# Patient Record
Sex: FEMALE | Race: WHITE | NOT HISPANIC OR LATINO | URBAN - METROPOLITAN AREA
[De-identification: names, ages, dates, MRNs, and addresses within clinical notes are randomized per-mention and may not be internally consistent; named-entity substitution may affect disease eponyms.]

---

## 2017-07-13 ENCOUNTER — INPATIENT (INPATIENT)
Facility: HOSPITAL | Age: 72
LOS: 3 days | Discharge: HOME | DRG: 690 | End: 2017-07-17
Attending: INTERNAL MEDICINE | Admitting: INTERNAL MEDICINE
Payer: MEDICARE

## 2017-07-13 VITALS
OXYGEN SATURATION: 97 % | HEART RATE: 67 BPM | RESPIRATION RATE: 16 BRPM | DIASTOLIC BLOOD PRESSURE: 71 MMHG | SYSTOLIC BLOOD PRESSURE: 147 MMHG | WEIGHT: 160.06 LBS | HEIGHT: 66 IN | TEMPERATURE: 100 F

## 2017-07-13 DIAGNOSIS — F20.5 RESIDUAL SCHIZOPHRENIA: ICD-10-CM

## 2017-07-13 DIAGNOSIS — R69 ILLNESS, UNSPECIFIED: ICD-10-CM

## 2017-07-13 DIAGNOSIS — F41.9 ANXIETY DISORDER, UNSPECIFIED: ICD-10-CM

## 2017-07-13 LAB
ANION GAP SERPL CALC-SCNC: 9 MMOL/L — SIGNIFICANT CHANGE UP (ref 5–17)
APAP SERPL-MCNC: <2 UG/ML — LOW (ref 10–30)
APPEARANCE UR: CLEAR — SIGNIFICANT CHANGE UP
BASOPHILS # BLD AUTO: 0.1 K/UL — SIGNIFICANT CHANGE UP (ref 0–0.2)
BASOPHILS NFR BLD AUTO: 1.1 % — SIGNIFICANT CHANGE UP (ref 0–2)
BILIRUB UR-MCNC: NEGATIVE — SIGNIFICANT CHANGE UP
BUN SERPL-MCNC: 25 MG/DL — HIGH (ref 7–18)
CALCIUM SERPL-MCNC: 9 MG/DL — SIGNIFICANT CHANGE UP (ref 8.4–10.5)
CHLORIDE SERPL-SCNC: 114 MMOL/L — HIGH (ref 96–108)
CO2 SERPL-SCNC: 23 MMOL/L — SIGNIFICANT CHANGE UP (ref 22–31)
COLOR SPEC: YELLOW — SIGNIFICANT CHANGE UP
CREAT SERPL-MCNC: 1.02 MG/DL — SIGNIFICANT CHANGE UP (ref 0.5–1.3)
DIFF PNL FLD: ABNORMAL
EOSINOPHIL # BLD AUTO: 0.1 K/UL — SIGNIFICANT CHANGE UP (ref 0–0.5)
EOSINOPHIL NFR BLD AUTO: 1.1 % — SIGNIFICANT CHANGE UP (ref 0–6)
GLUCOSE SERPL-MCNC: 96 MG/DL — SIGNIFICANT CHANGE UP (ref 70–99)
GLUCOSE UR QL: NEGATIVE — SIGNIFICANT CHANGE UP
HCT VFR BLD CALC: 39.4 % — SIGNIFICANT CHANGE UP (ref 34.5–45)
HGB BLD-MCNC: 13.5 G/DL — SIGNIFICANT CHANGE UP (ref 11.5–15.5)
KETONES UR-MCNC: ABNORMAL
LEUKOCYTE ESTERASE UR-ACNC: ABNORMAL
LYMPHOCYTES # BLD AUTO: 3.4 K/UL — HIGH (ref 1–3.3)
LYMPHOCYTES # BLD AUTO: 30.7 % — SIGNIFICANT CHANGE UP (ref 13–44)
MCHC RBC-ENTMCNC: 32.8 PG — SIGNIFICANT CHANGE UP (ref 27–34)
MCHC RBC-ENTMCNC: 34.4 GM/DL — SIGNIFICANT CHANGE UP (ref 32–36)
MCV RBC AUTO: 95.4 FL — SIGNIFICANT CHANGE UP (ref 80–100)
MONOCYTES # BLD AUTO: 1 K/UL — HIGH (ref 0–0.9)
MONOCYTES NFR BLD AUTO: 9.2 % — SIGNIFICANT CHANGE UP (ref 2–14)
NEUTROPHILS # BLD AUTO: 6.4 K/UL — SIGNIFICANT CHANGE UP (ref 1.8–7.4)
NEUTROPHILS NFR BLD AUTO: 57.9 % — SIGNIFICANT CHANGE UP (ref 43–77)
NITRITE UR-MCNC: POSITIVE
PCP SPEC-MCNC: SIGNIFICANT CHANGE UP
PH UR: 5 — SIGNIFICANT CHANGE UP (ref 5–8)
PLATELET # BLD AUTO: 171 K/UL — SIGNIFICANT CHANGE UP (ref 150–400)
POTASSIUM SERPL-MCNC: 3.5 MMOL/L — SIGNIFICANT CHANGE UP (ref 3.5–5.3)
POTASSIUM SERPL-SCNC: 3.5 MMOL/L — SIGNIFICANT CHANGE UP (ref 3.5–5.3)
PROT UR-MCNC: 30 MG/DL
RBC # BLD: 4.12 M/UL — SIGNIFICANT CHANGE UP (ref 3.8–5.2)
RBC # FLD: 12.3 % — SIGNIFICANT CHANGE UP (ref 10.3–14.5)
SALICYLATES SERPL-MCNC: <1.7 MG/DL — LOW (ref 2.8–20)
SODIUM SERPL-SCNC: 146 MMOL/L — HIGH (ref 135–145)
SP GR SPEC: 1.03 — HIGH (ref 1.01–1.02)
UROBILINOGEN FLD QL: 1
WBC # BLD: 11.1 K/UL — HIGH (ref 3.8–10.5)
WBC # FLD AUTO: 11.1 K/UL — HIGH (ref 3.8–10.5)

## 2017-07-13 PROCEDURE — 70450 CT HEAD/BRAIN W/O DYE: CPT | Mod: 26

## 2017-07-13 RX ORDER — CEFTRIAXONE 500 MG/1
1 INJECTION, POWDER, FOR SOLUTION INTRAMUSCULAR; INTRAVENOUS ONCE
Qty: 0 | Refills: 0 | Status: COMPLETED | OUTPATIENT
Start: 2017-07-13 | End: 2017-07-13

## 2017-07-13 RX ORDER — FLUPHENAZINE HYDROCHLORIDE 1 MG/1
1 TABLET, FILM COATED ORAL ONCE
Qty: 0 | Refills: 0 | Status: COMPLETED | OUTPATIENT
Start: 2017-07-13 | End: 2017-07-13

## 2017-07-13 RX ADMIN — CEFTRIAXONE 100 GRAM(S): 500 INJECTION, POWDER, FOR SOLUTION INTRAMUSCULAR; INTRAVENOUS at 23:47

## 2017-07-13 RX ADMIN — FLUPHENAZINE HYDROCHLORIDE 1 MILLIGRAM(S): 1 TABLET, FILM COATED ORAL at 23:41

## 2017-07-13 NOTE — ED PROVIDER NOTE - OBJECTIVE STATEMENT
73 y/o F pt w/ PMHx of schizophrenia was BIB son to ED c/o insomnia and weakness x3 days. Pt notes that she has not been sleeping or eating much the last 3 days. Pt denies pain, fever, chills, nausea, vomiting, hallucinations, SI/HI, or any other complaints. NKDA.

## 2017-07-13 NOTE — ED BEHAVIORAL HEALTH ASSESSMENT NOTE - SUMMARY
Patient is a 72 year old  female; domiciled with adult son; noncaregiver; PPH of schizophrenia; 3 prior hospitalizations last in 1980s; no known suicide attempts; no known history of violence or arrests; no active substance abuse or known history of complicated withdrawal; PMH of HTN and hypercholesterolemia brought in by sons due to weakness and not sleeping. Patient appears anxious and at times confused, however denies si/hi/avh, no delusions, no safety concerns from son or outpatient therapist. Patient has close follow up with out patient therapist and psychiatrist, given dose of her prolixin in ER as ran out of medication. Patient is currently not a danger to herself or others and does not require inpatient psychiatrist hospitalization.

## 2017-07-13 NOTE — ED PROVIDER NOTE - CARE PLAN
Principal Discharge DX:	Acute cystitis with hematuria  Secondary Diagnosis:	Schizophrenia, unspecified type  Secondary Diagnosis:	Delirium

## 2017-07-13 NOTE — ED BEHAVIORAL HEALTH ASSESSMENT NOTE - RISK ASSESSMENT
Although patient has risk factors of  schizophrenia, anxiety symptoms, prior hospitalizations, positive family hx, she has protective factors of  no hx of prior suicide attempts, no self- injurious behavior, no history of violence or aggression, motivation to participate in outpatient care and seek help, compliance with medications- f/u, no active substance use, no access to firearms, no hx of abuse. Patient is currently of low acute risk of danger to herself or others and does not require inpatient psychiatric hospitalization.

## 2017-07-13 NOTE — ED ADULT NURSE NOTE - CHIEF COMPLAINT QUOTE
C/o I haven't been sleeping for 3 days. "She needs a prolixin shot" per son, she hasn't taken it in a few months.

## 2017-07-13 NOTE — ED BEHAVIORAL HEALTH ASSESSMENT NOTE - HPI (INCLUDE ILLNESS QUALITY, SEVERITY, DURATION, TIMING, CONTEXT, MODIFYING FACTORS, ASSOCIATED SIGNS AND SYMPTOMS)
Patient is a 72 year old  female; domiciled with adult son; noncaregiver; PPH of schizophrenia; 3 prior hospitalizations last in 1980s; no known suicide attempts; no known history of violence or arrests; no active substance abuse or known history of complicated withdrawal; PMH of HTN and hypercholesterolemia brought in by sons due to weakness and not sleeping.    The patient denies depression or other significant mood symptoms.  Specifically, the patient denies manic symptoms, past and present.  The patient denies auditory or visual hallucinations, and no delusions could be elicited on direct questioning.  The patient denies suicidal ideation, homicidal ideation, intent, or plan. She reports anxiety regarding her sons' health, becomes tearful when talking about the possibility of outliving her sons. She states that she was in her usual state of health, going shopping for food, cleaning dishes and laundry and the last 3 days has been unable to sleep, has been weak and not able to do the things she usually does. She admits to some feelings of confusion, not being sure of the date. She is unable to spell WORLD backwards or count backwards from 100 by 3s or 7s. Patient is a 72 year old  female; domiciled with adult son; noncaregiver; PPH of schizophrenia; 3 prior hospitalizations last in 1980s; no known suicide attempts; no known history of violence or arrests; no active substance abuse or known history of complicated withdrawal; PMH of HTN and hypercholesterolemia brought in by sons due to weakness and not sleeping.    The patient denies depression or other significant mood symptoms.  Specifically, the patient denies manic symptoms, past and present.  The patient denies auditory or visual hallucinations, and no delusions could be elicited on direct questioning.  The patient denies suicidal ideation, homicidal ideation, intent, or plan. She reports anxiety regarding her sons' health, becomes tearful when talking about the possibility of outliving her sons. She states that she was in her usual state of health, going shopping for food, cleaning dishes and laundry and the last 3 days has been unable to sleep, has been weak and not able to do the things she usually does. She admits to some feelings of confusion, not being sure of the date. She is unable to spell WORLD backwards or count backwards from 100 by 3s or 7s. Patient states that she is compliant with her medications, prolixin, mirtazepine, and benydryl, Patient is a 72 year old  female; domiciled with adult son; noncaregiver; PPH of schizophrenia; 3 prior hospitalizations last in 1980s; no known suicide attempts; no known history of violence or arrests; no active substance abuse or known history of complicated withdrawal; PMH of HTN and hypercholesterolemia brought in by sons due to weakness and not sleeping.    The patient denies depression or other significant mood symptoms.  Specifically, the patient denies manic symptoms, past and present.  The patient denies auditory or visual hallucinations, and no delusions could be elicited on direct questioning.  The patient denies suicidal ideation, homicidal ideation, intent, or plan. She reports anxiety regarding her sons' health, becomes tearful when talking about the possibility of outliving her sons. She states that she was in her usual state of health, going shopping for food, cleaning dishes and laundry and the last 3 days has been unable to sleep, has been weak and not able to do the things she usually does. She admits to some feelings of confusion, not being sure of the date. She is unable to spell WORLD backwards or count backwards from 100 by 3s or 7s. Patient states that she is compliant with her medications, prolixin, mirtazepine, and benadryl, as well as her medications for hypertension and high cholesterol.    See SW note for further collateral. Per pts therapist, she is compliant with treatment, in terms of medications and Patient is a 72 year old  female; domiciled with adult son; noncaregiver; PPH of schizophrenia; 3 prior hospitalizations last in 1980s; no known suicide attempts; no known history of violence or arrests; no active substance abuse or known history of complicated withdrawal; PMH of HTN and hypercholesterolemia brought in by sons due to weakness and not sleeping.    The patient denies depression or other significant mood symptoms.  Specifically, the patient denies manic symptoms, past and present.  The patient denies auditory or visual hallucinations, and no delusions could be elicited on direct questioning.  The patient denies suicidal ideation, homicidal ideation, intent, or plan. She reports anxiety regarding her sons' health, becomes tearful when talking about the possibility of outliving her sons. She states that she was in her usual state of health, going shopping for food, cleaning dishes and laundry and the last 3 days has been unable to sleep, has been weak and not able to do the things she usually does. She admits to some feelings of confusion, not being sure of the date. She is unable to spell WORLD backwards or count backwards from 100 by 3s or 7s. Patient states that she is compliant with her medications, prolixin, mirtazepine, and benadryl, as well as her medications for hypertension and high cholesterol.    See SW note for further collateral. Per pts therapist, she is compliant with treatment, in terms of medications and follow up for therapy. Therapist states that he psychiatrist switched her from IM to PO prolixin a few months ago and she has been doing well with the switch. He states that she has been at her baseline, anxious about son's and their health (states lyla So has MH issues of his own and has medical issues which he has not been taking care of). Therapist denies history of suicidality or aggression, no psychosis in many years. Per son Judah, patient has not been sleeping and this has been distressing for him, however he denies concerns regarding patient's safety or safety of others. Son states that he feels that patient did better when on prolixin injection and is hoping that she will return to that. SW stressed importance of patient attending appointment with her therapist and psychiatrist this Saturday 7/15, son aware.    Plan communicated with ER attending Dr Tuttle, states that pt states she did not take her medication because she ran out, to get 1 dose in the ER. Will communicate disposition and that patient needs RX ASAP to therapist and psychiatrist.

## 2017-07-13 NOTE — ED BEHAVIORAL HEALTH ASSESSMENT NOTE - OTHER PAST PSYCHIATRIC HISTORY (INCLUDE DETAILS REGARDING ONSET, COURSE OF ILLNESS, INPATIENT/OUTPATIENT TREATMENT)
history of schizophrenia, multiple admissions in 1980s, none since then, currently on prolixin, remeron and benadryl, sees psychiatrist and therapist at [facility]

## 2017-07-13 NOTE — ED ADULT NURSE NOTE - OBJECTIVE STATEMENT
72 years old female known history of schizophrenia brought to ED by son history reported of insomnia for 3 days. Son also stated that patient has not been having her I.M injectables for the last 2 months and thinks that she needs further psychiatric evaluation. Patient placed on 1:1, yellow gown in place, roam alert and red socks applied.

## 2017-07-14 DIAGNOSIS — E78.00 PURE HYPERCHOLESTEROLEMIA, UNSPECIFIED: ICD-10-CM

## 2017-07-14 DIAGNOSIS — F20.9 SCHIZOPHRENIA, UNSPECIFIED: ICD-10-CM

## 2017-07-14 DIAGNOSIS — N30.01 ACUTE CYSTITIS WITH HEMATURIA: ICD-10-CM

## 2017-07-14 DIAGNOSIS — Z90.710 ACQUIRED ABSENCE OF BOTH CERVIX AND UTERUS: Chronic | ICD-10-CM

## 2017-07-14 DIAGNOSIS — I10 ESSENTIAL (PRIMARY) HYPERTENSION: ICD-10-CM

## 2017-07-14 DIAGNOSIS — Z29.9 ENCOUNTER FOR PROPHYLACTIC MEASURES, UNSPECIFIED: ICD-10-CM

## 2017-07-14 DIAGNOSIS — E87.8 OTHER DISORDERS OF ELECTROLYTE AND FLUID BALANCE, NOT ELSEWHERE CLASSIFIED: ICD-10-CM

## 2017-07-14 LAB
24R-OH-CALCIDIOL SERPL-MCNC: 30.6 NG/ML — SIGNIFICANT CHANGE UP (ref 30–100)
ALBUMIN SERPL ELPH-MCNC: 3.5 G/DL — SIGNIFICANT CHANGE UP (ref 3.5–5)
ALP SERPL-CCNC: 121 U/L — HIGH (ref 40–120)
ALT FLD-CCNC: 24 U/L DA — SIGNIFICANT CHANGE UP (ref 10–60)
ANION GAP SERPL CALC-SCNC: 10 MMOL/L — SIGNIFICANT CHANGE UP (ref 5–17)
ANION GAP SERPL CALC-SCNC: 9 MMOL/L — SIGNIFICANT CHANGE UP (ref 5–17)
AST SERPL-CCNC: 25 U/L — SIGNIFICANT CHANGE UP (ref 10–40)
BASOPHILS # BLD AUTO: 0 K/UL — SIGNIFICANT CHANGE UP (ref 0–0.2)
BASOPHILS NFR BLD AUTO: 0.5 % — SIGNIFICANT CHANGE UP (ref 0–2)
BILIRUB SERPL-MCNC: 1 MG/DL — SIGNIFICANT CHANGE UP (ref 0.2–1.2)
BUN SERPL-MCNC: 15 MG/DL — SIGNIFICANT CHANGE UP (ref 7–18)
BUN SERPL-MCNC: 16 MG/DL — SIGNIFICANT CHANGE UP (ref 7–18)
CALCIUM SERPL-MCNC: 8.7 MG/DL — SIGNIFICANT CHANGE UP (ref 8.4–10.5)
CALCIUM SERPL-MCNC: 8.8 MG/DL — SIGNIFICANT CHANGE UP (ref 8.4–10.5)
CHLORIDE SERPL-SCNC: 108 MMOL/L — SIGNIFICANT CHANGE UP (ref 96–108)
CHLORIDE SERPL-SCNC: 110 MMOL/L — HIGH (ref 96–108)
CHOLEST SERPL-MCNC: 181 MG/DL — SIGNIFICANT CHANGE UP (ref 10–199)
CO2 SERPL-SCNC: 23 MMOL/L — SIGNIFICANT CHANGE UP (ref 22–31)
CO2 SERPL-SCNC: 27 MMOL/L — SIGNIFICANT CHANGE UP (ref 22–31)
CREAT SERPL-MCNC: 0.77 MG/DL — SIGNIFICANT CHANGE UP (ref 0.5–1.3)
CREAT SERPL-MCNC: 0.95 MG/DL — SIGNIFICANT CHANGE UP (ref 0.5–1.3)
EOSINOPHIL # BLD AUTO: 0 K/UL — SIGNIFICANT CHANGE UP (ref 0–0.5)
EOSINOPHIL NFR BLD AUTO: 0.6 % — SIGNIFICANT CHANGE UP (ref 0–6)
GLUCOSE SERPL-MCNC: 113 MG/DL — HIGH (ref 70–99)
GLUCOSE SERPL-MCNC: 151 MG/DL — HIGH (ref 70–99)
HBA1C BLD-MCNC: 5.6 % — SIGNIFICANT CHANGE UP (ref 4–5.6)
HCT VFR BLD CALC: 37.9 % — SIGNIFICANT CHANGE UP (ref 34.5–45)
HDLC SERPL-MCNC: 63 MG/DL — SIGNIFICANT CHANGE UP (ref 40–125)
HGB BLD-MCNC: 13.3 G/DL — SIGNIFICANT CHANGE UP (ref 11.5–15.5)
LIPID PNL WITH DIRECT LDL SERPL: 100 MG/DL — SIGNIFICANT CHANGE UP
LYMPHOCYTES # BLD AUTO: 1.9 K/UL — SIGNIFICANT CHANGE UP (ref 1–3.3)
LYMPHOCYTES # BLD AUTO: 21.7 % — SIGNIFICANT CHANGE UP (ref 13–44)
MAGNESIUM SERPL-MCNC: 2 MG/DL — SIGNIFICANT CHANGE UP (ref 1.6–2.6)
MCHC RBC-ENTMCNC: 33 PG — SIGNIFICANT CHANGE UP (ref 27–34)
MCHC RBC-ENTMCNC: 35.1 GM/DL — SIGNIFICANT CHANGE UP (ref 32–36)
MCV RBC AUTO: 94 FL — SIGNIFICANT CHANGE UP (ref 80–100)
MONOCYTES # BLD AUTO: 0.5 K/UL — SIGNIFICANT CHANGE UP (ref 0–0.9)
MONOCYTES NFR BLD AUTO: 6.2 % — SIGNIFICANT CHANGE UP (ref 2–14)
NEUTROPHILS # BLD AUTO: 6.2 K/UL — SIGNIFICANT CHANGE UP (ref 1.8–7.4)
NEUTROPHILS NFR BLD AUTO: 71 % — SIGNIFICANT CHANGE UP (ref 43–77)
PHOSPHATE SERPL-MCNC: 1.7 MG/DL — LOW (ref 2.5–4.5)
PLATELET # BLD AUTO: 154 K/UL — SIGNIFICANT CHANGE UP (ref 150–400)
POTASSIUM SERPL-MCNC: 2.8 MMOL/L — CRITICAL LOW (ref 3.5–5.3)
POTASSIUM SERPL-MCNC: 3.4 MMOL/L — LOW (ref 3.5–5.3)
POTASSIUM SERPL-SCNC: 2.8 MMOL/L — CRITICAL LOW (ref 3.5–5.3)
POTASSIUM SERPL-SCNC: 3.4 MMOL/L — LOW (ref 3.5–5.3)
PROT SERPL-MCNC: 7.3 G/DL — SIGNIFICANT CHANGE UP (ref 6–8.3)
RBC # BLD: 4.03 M/UL — SIGNIFICANT CHANGE UP (ref 3.8–5.2)
RBC # FLD: 12.4 % — SIGNIFICANT CHANGE UP (ref 10.3–14.5)
SODIUM SERPL-SCNC: 143 MMOL/L — SIGNIFICANT CHANGE UP (ref 135–145)
SODIUM SERPL-SCNC: 144 MMOL/L — SIGNIFICANT CHANGE UP (ref 135–145)
TOTAL CHOLESTEROL/HDL RATIO MEASUREMENT: 2.9 RATIO — LOW (ref 3.3–7.1)
TRIGL SERPL-MCNC: 88 MG/DL — SIGNIFICANT CHANGE UP (ref 10–149)
TSH SERPL-MCNC: 4.13 UU/ML — SIGNIFICANT CHANGE UP (ref 0.34–4.82)
WBC # BLD: 8.7 K/UL — SIGNIFICANT CHANGE UP (ref 3.8–10.5)
WBC # FLD AUTO: 8.7 K/UL — SIGNIFICANT CHANGE UP (ref 3.8–10.5)

## 2017-07-14 PROCEDURE — 99285 EMERGENCY DEPT VISIT HI MDM: CPT

## 2017-07-14 RX ORDER — ATORVASTATIN CALCIUM 80 MG/1
10 TABLET, FILM COATED ORAL AT BEDTIME
Qty: 0 | Refills: 0 | Status: DISCONTINUED | OUTPATIENT
Start: 2017-07-14 | End: 2017-07-17

## 2017-07-14 RX ORDER — MIRTAZAPINE 45 MG/1
7.5 TABLET, ORALLY DISINTEGRATING ORAL DAILY
Qty: 0 | Refills: 0 | Status: DISCONTINUED | OUTPATIENT
Start: 2017-07-14 | End: 2017-07-17

## 2017-07-14 RX ORDER — FLUPHENAZINE HYDROCHLORIDE 1 MG/1
1 TABLET, FILM COATED ORAL DAILY
Qty: 0 | Refills: 0 | Status: DISCONTINUED | OUTPATIENT
Start: 2017-07-14 | End: 2017-07-17

## 2017-07-14 RX ORDER — METOPROLOL TARTRATE 50 MG
12.5 TABLET ORAL
Qty: 0 | Refills: 0 | Status: DISCONTINUED | OUTPATIENT
Start: 2017-07-14 | End: 2017-07-14

## 2017-07-14 RX ORDER — SODIUM CHLORIDE 9 MG/ML
1000 INJECTION, SOLUTION INTRAVENOUS
Qty: 0 | Refills: 0 | Status: DISCONTINUED | OUTPATIENT
Start: 2017-07-14 | End: 2017-07-17

## 2017-07-14 RX ORDER — FUROSEMIDE 40 MG
20 TABLET ORAL DAILY
Qty: 0 | Refills: 0 | Status: DISCONTINUED | OUTPATIENT
Start: 2017-07-14 | End: 2017-07-14

## 2017-07-14 RX ORDER — AMLODIPINE BESYLATE 2.5 MG/1
10 TABLET ORAL DAILY
Qty: 0 | Refills: 0 | Status: DISCONTINUED | OUTPATIENT
Start: 2017-07-14 | End: 2017-07-17

## 2017-07-14 RX ORDER — POTASSIUM CHLORIDE 20 MEQ
10 PACKET (EA) ORAL DAILY
Qty: 0 | Refills: 0 | Status: DISCONTINUED | OUTPATIENT
Start: 2017-07-14 | End: 2017-07-16

## 2017-07-14 RX ORDER — DIPHENHYDRAMINE HCL 50 MG
50 CAPSULE ORAL AT BEDTIME
Qty: 0 | Refills: 0 | Status: DISCONTINUED | OUTPATIENT
Start: 2017-07-14 | End: 2017-07-17

## 2017-07-14 RX ORDER — POTASSIUM PHOSPHATE, MONOBASIC POTASSIUM PHOSPHATE, DIBASIC 236; 224 MG/ML; MG/ML
15 INJECTION, SOLUTION INTRAVENOUS ONCE
Qty: 0 | Refills: 0 | Status: COMPLETED | OUTPATIENT
Start: 2017-07-14 | End: 2017-07-14

## 2017-07-14 RX ORDER — POTASSIUM CHLORIDE 20 MEQ
10 PACKET (EA) ORAL
Qty: 0 | Refills: 0 | Status: COMPLETED | OUTPATIENT
Start: 2017-07-14 | End: 2017-07-14

## 2017-07-14 RX ORDER — FUROSEMIDE 40 MG
20 TABLET ORAL DAILY
Qty: 0 | Refills: 0 | Status: DISCONTINUED | OUTPATIENT
Start: 2017-07-14 | End: 2017-07-17

## 2017-07-14 RX ORDER — CEFTRIAXONE 500 MG/1
1 INJECTION, POWDER, FOR SOLUTION INTRAMUSCULAR; INTRAVENOUS EVERY 24 HOURS
Qty: 0 | Refills: 0 | Status: DISCONTINUED | OUTPATIENT
Start: 2017-07-14 | End: 2017-07-17

## 2017-07-14 RX ADMIN — FLUPHENAZINE HYDROCHLORIDE 1 MILLIGRAM(S): 1 TABLET, FILM COATED ORAL at 18:05

## 2017-07-14 RX ADMIN — ATORVASTATIN CALCIUM 10 MILLIGRAM(S): 80 TABLET, FILM COATED ORAL at 22:20

## 2017-07-14 RX ADMIN — Medication 20 MILLIGRAM(S): at 18:05

## 2017-07-14 RX ADMIN — Medication 10 MILLIEQUIVALENT(S): at 18:06

## 2017-07-14 RX ADMIN — CEFTRIAXONE 100 GRAM(S): 500 INJECTION, POWDER, FOR SOLUTION INTRAMUSCULAR; INTRAVENOUS at 22:20

## 2017-07-14 RX ADMIN — POTASSIUM PHOSPHATE, MONOBASIC POTASSIUM PHOSPHATE, DIBASIC 62.5 MILLIMOLE(S): 236; 224 INJECTION, SOLUTION INTRAVENOUS at 11:14

## 2017-07-14 RX ADMIN — Medication 100 MILLIEQUIVALENT(S): at 11:13

## 2017-07-14 RX ADMIN — AMLODIPINE BESYLATE 10 MILLIGRAM(S): 2.5 TABLET ORAL at 18:05

## 2017-07-14 RX ADMIN — Medication 100 MILLIEQUIVALENT(S): at 13:23

## 2017-07-14 RX ADMIN — MIRTAZAPINE 7.5 MILLIGRAM(S): 45 TABLET, ORALLY DISINTEGRATING ORAL at 18:05

## 2017-07-14 RX ADMIN — Medication 12.5 MILLIGRAM(S): at 05:48

## 2017-07-14 RX ADMIN — Medication 50 MILLIGRAM(S): at 23:36

## 2017-07-14 NOTE — PROGRESS NOTE ADULT - SUBJECTIVE AND OBJECTIVE BOX
Patient is a 72y old  Female who presents with a chief complaint of AMS (2017 04:43). Reported that patient has weakness and has had insomnia  x 3 days. PMH significant for HTN, HLD, and schizophrenia.      INTERVAL HPI/OVERNIGHT EVENTS: no acute events overnight. Tmax 99.5 (in ED)    I&O's Summary    Vital Signs Last 24 Hrs  T(C): 37.2 (2017 05:38), Max: 37.5 (2017 18:54)  T(F): 98.9 (2017 05:38), Max: 99.5 (2017 18:54)  HR: 67 (2017 05:38) (67 - 90)  BP: 154/69 (2017 05:38) (138/68 - 159/81)  BP(mean): --  RR: 20 (2017 05:38) (16 - 20)  SpO2: 95% (2017 05:38) (95% - 98%)  PAST MEDICAL & SURGICAL HISTORY:  Hypercholesteremia  Hypertension  Schizophrenia  H/O: hysterectomy      SOCIAL HISTORY  Alcohol: pt denies  Tobacco: pt denies  Illicit substance use: pt denies      FAMILY HISTORY: Pt states mental illness runs in family. Possible history of heart disease. Denies any family history of cancer      LABS:                        13.3   8.7   )-----------( 154      ( 2017 07:29 )             37.9     07-14    143  |  110<H>  |  15  ----------------------------<  151<H>  2.8<LL>   |  23  |  0.77    Ca    8.7      2017 07:29  Phos  1.7     07-14  Mg     2.0     07-14    TPro  7.3  /  Alb  3.5  /  TBili  1.0  /  DBili  x   /  AST  25  /  ALT  24  /  AlkPhos  121<H>  07-14      CAPILLARY BLOOD GLUCOSE            Urinalysis Basic - ( 2017 23:29 )    Color: Yellow / Appearance: Clear / S.030 / pH: x  Gluc: x / Ketone: Trace  / Bili: Negative / Urobili: 1   Blood: x / Protein: 30 mg/dL / Nitrite: Positive   Leuk Esterase: Moderate / RBC: 2-5 /HPF / WBC 26-50 /HPF   Sq Epi: x / Non Sq Epi: Many / Bacteria: Moderate /HPF        MEDICATIONS  (STANDING):  cefTRIAXone   IVPB 1 Gram(s) IV Intermittent every 24 hours  metoprolol 12.5 milliGRAM(s) Oral two times a day  sodium chloride 0.45%. 1000 milliLiter(s) (50 mL/Hr) IV Continuous <Continuous>  potassium chloride  10 mEq/100 mL IVPB 10 milliEquivalent(s) IV Intermittent every 1 hour  potassium phosphate IVPB 15 milliMole(s) IV Intermittent once    MEDICATIONS  (PRN):      REVIEW OF SYSTEMS:  CONSTITUTIONAL: Denies fatigue. No fever or weight loss.  EYES: No eye pain, visual disturbances, or discharge  ENMT:  No difficulty hearing, tinnitus, vertigo; No sinus or throat pain  NECK: No pain or stiffness  RESPIRATORY: No cough, wheezing, chills or hemoptysis; No shortness of breath  CARDIOVASCULAR: No chest pain, palpitations, dizziness, or leg swelling  GASTROINTESTINAL: No abdominal or epigastric pain. No nausea, vomiting, or hematemesis; No diarrhea or constipation. No melena or hematochezia.  GENITOURINARY: No dysuria, frequency, hematuria, or incontinence  NEUROLOGICAL: No headaches, memory loss, loss of strength, numbness, or tremors  SKIN: No itching, burning, rashes, or lesions   LYMPH NODES: No enlarged glands  ENDOCRINE: No heat or cold intolerance; No hair loss  MUSCULOSKELETAL: No joint pain or swelling; No muscle, back, or extremity pain  PSYCHIATRIC: Denies difficulty sleeping.  No depression, anxiety, or mood swing.  HEME/LYMPH: No easy bruising, or bleeding gums  ALLERGY AND IMMUNOLOGIC: No hives or eczema    RADIOLOGY & ADDITIONAL TESTS:    Imaging Personally Reviewed:  [x] YES  [ ] NO    Consultant(s) Notes Reviewed:  [x] YES  [ ] NO    PHYSICAL EXAM:  GENERAL: NAD, well-groomed, well-developed female sitting up at edge of bed having breakfast  HEAD:  Atraumatic, Normocephalic  EYES: EOMI, PERRLA, conjunctiva and sclera clear  ENMT: No tonsillar erythema, exudates, or enlargement; Moist mucous membranes, Good dentition, No lesions  NECK: Supple, No JVD, Normal thyroid  NERVOUS SYSTEM:  Alert & Oriented X3, Lacks concentration; perseverates on certain topics, and needs redirection to answer questions. Motor Strength 5/5 B/L upper and lower extremities; DTRs 2+ intact and symmetric  CHEST/LUNG: Clear to percussion bilaterally; No rales, rhonchi, wheezing, or rubs  HEART: Regular rate and rhythm; No murmurs, rubs, or gallops  ABDOMEN: Soft, Nontender, Nondistended; Bowel sounds present  EXTREMITIES:  2+ Peripheral Pulses, No clubbing, cyanosis, or edema  LYMPH: No lymphadenopathy noted  SKIN: No rashes or lesions    Care Collaborated Discussed with Consultants/Other Providers [x] YES  [ ] NO Patient is a 72y old  Female who presents with a chief complaint of AMS (2017 04:43). Reported that patient has weakness and has had insomnia  x 3 days. PMH significant for HTN, HLD, and schizophrenia.      INTERVAL HPI/OVERNIGHT EVENTS: no acute events overnight. Tmax 99.5 (in ED)    I&O's Summary    Vital Signs Last 24 Hrs  T(C): 37.2 (2017 05:38), Max: 37.5 (2017 18:54)  T(F): 98.9 (2017 05:38), Max: 99.5 (2017 18:54)  HR: 67 (2017 05:38) (67 - 90)  BP: 154/69 (2017 05:38) (138/68 - 159/81)  BP(mean): --  RR: 20 (2017 05:38) (16 - 20)  SpO2: 95% (2017 05:38) (95% - 98%)  PAST MEDICAL & SURGICAL HISTORY:  Hypercholesteremia  Hypertension  Schizophrenia  H/O: hysterectomy      SOCIAL HISTORY  Alcohol: pt denies  Tobacco: pt denies  Illicit substance use: pt denies      FAMILY HISTORY: Pt states mental illness runs in family. Possible history of heart disease. Denies any family history of cancer      LABS:                        13.3   8.7   )-----------( 154      ( 2017 07:29 )             37.9     07-14    143  |  110<H>  |  15  ----------------------------<  151<H>  2.8<LL>   |  23  |  0.77    Ca    8.7      2017 07:29  Phos  1.7     07-14  Mg     2.0     07-14    TPro  7.3  /  Alb  3.5  /  TBili  1.0  /  DBili  x   /  AST  25  /  ALT  24  /  AlkPhos  121<H>  07-14      CAPILLARY BLOOD GLUCOSE            Urinalysis Basic - ( 2017 23:29 )    Color: Yellow / Appearance: Clear / S.030 / pH: x  Gluc: x / Ketone: Trace  / Bili: Negative / Urobili: 1   Blood: x / Protein: 30 mg/dL / Nitrite: Positive   Leuk Esterase: Moderate / RBC: 2-5 /HPF / WBC 26-50 /HPF   Sq Epi: x / Non Sq Epi: Many / Bacteria: Moderate /HPF        MEDICATIONS  (STANDING):  cefTRIAXone   IVPB 1 Gram(s) IV Intermittent every 24 hours  metoprolol 12.5 milliGRAM(s) Oral two times a day  sodium chloride 0.45%. 1000 milliLiter(s) (50 mL/Hr) IV Continuous <Continuous>  potassium chloride  10 mEq/100 mL IVPB 10 milliEquivalent(s) IV Intermittent every 1 hour  potassium phosphate IVPB 15 milliMole(s) IV Intermittent once    MEDICATIONS  (PRN):      REVIEW OF SYSTEMS:  CONSTITUTIONAL: Denies fatigue. No fever or weight loss.  EYES: No eye pain, visual disturbances, or discharge  ENMT:  No difficulty hearing, tinnitus, vertigo; No sinus or throat pain  NECK: No pain or stiffness  RESPIRATORY: No cough, wheezing, chills or hemoptysis; No shortness of breath  CARDIOVASCULAR: No chest pain, palpitations, dizziness, or leg swelling  GASTROINTESTINAL: No abdominal or epigastric pain. No nausea, vomiting, or hematemesis; No diarrhea or constipation. No melena or hematochezia.  GENITOURINARY: No dysuria, frequency, hematuria, or incontinence  NEUROLOGICAL: No headaches, memory loss, loss of strength, numbness, or tremors  SKIN: No itching, burning, rashes, or lesions   LYMPH NODES: No enlarged glands  ENDOCRINE: No heat or cold intolerance; No hair loss  MUSCULOSKELETAL: No joint pain or swelling; No muscle, back, or extremity pain  PSYCHIATRIC: Denies difficulty sleeping.  No depression, anxiety, or mood swing.  HEME/LYMPH: No easy bruising, or bleeding gums  ALLERGY AND IMMUNOLOGIC: No hives or eczema    RADIOLOGY & ADDITIONAL TESTS:  < from: CT Head No Cont (17 @ 23:59) >  No acute intracranial hemorrhage, mass effect, or evidence of acute   vascular territorial infarction.    If clinical symptoms persist or worsen, more sensitive evaluation with   brain MRI may be obtained, if no contraindicationsexist.      Imaging Personally Reviewed:  [x] YES  [ ] NO    Consultant(s) Notes Reviewed:  [x] YES  [ ] NO    PHYSICAL EXAM:  GENERAL: NAD, well-groomed, well-developed female sitting up at edge of bed having breakfast  HEAD:  Atraumatic, Normocephalic  EYES: EOMI, PERRLA, conjunctiva and sclera clear  ENMT: No tonsillar erythema, exudates, or enlargement; Moist mucous membranes, Good dentition, No lesions  NECK: Supple, No JVD, Normal thyroid  NERVOUS SYSTEM:  Alert & Oriented X3, Lacks concentration; perseverates on certain topics, and needs redirection to answer questions. Motor Strength 5/5 B/L upper and lower extremities; DTRs 2+ intact and symmetric  CHEST/LUNG: Clear to percussion bilaterally; No rales, rhonchi, wheezing, or rubs  HEART: Regular rate and rhythm; No murmurs, rubs, or gallops  ABDOMEN: Soft, Nontender, Nondistended; Bowel sounds present  EXTREMITIES:  2+ Peripheral Pulses, No clubbing, cyanosis, or edema  LYMPH: No lymphadenopathy noted  SKIN: No rashes or lesions    Care Collaborated Discussed with Consultants/Other Providers [x] YES  [ ] NO

## 2017-07-14 NOTE — H&P ADULT - NSHPPHYSICALEXAM_GEN_ALL_CORE
INTERVAL HPI/OVERNIGHT EVENTS:  T(C): 36.7 (07-14-17 @ 03:35), Max: 37.5 (07-13-17 @ 18:54)  HR: 77 (07-14-17 @ 03:35) (67 - 90)  BP: 159/81 (07-14-17 @ 03:35) (138/68 - 159/81)  RR: 20 (07-14-17 @ 03:35) (16 - 20)  SpO2: 97% (07-14-17 @ 03:35) (97% - 98%)    PHYSICAL EXAM:  GENERAL: nervous, wants to go home tomorrow, well-groomed, well-developed  HEAD:  Atraumatic, Normocephalic  EYES: EOMI, PERRLA, conjunctiva and sclera clear  ENMT: No tonsillar erythema, exudates, or enlargement; Moist mucous membranes, Good dentition, No lesions  NECK: Supple, No JVD, Normal thyroid  NERVOUS SYSTEM:  Alert & Oriented X2, had to ask same questions several times to get answer; nervous, speaks in very short sentences and doesn't go into detail for any questions; Motor Strength 5/5 B/L upper and lower extremities; DTRs 2+ intact and symmetric  CHEST/LUNG: Clear to percussion bilaterally; No rales, rhonchi, wheezing, or rubs  HEART: Regular rate and rhythm; No murmurs, rubs, or gallops  ABDOMEN: Soft, Nontender, Nondistended; Bowel sounds present  EXTREMITIES:  2+ Peripheral Pulses, No clubbing, cyanosis, or edema

## 2017-07-14 NOTE — H&P ADULT - PROBLEM SELECTOR PLAN 4
primary team to find which statin patient takes, as she doesn't remember  family did not answer the phone on admission  f/u lipid profile

## 2017-07-14 NOTE — PROGRESS NOTE ADULT - PROBLEM SELECTOR PLAN 5
IMPROVE SCORE 0. No need for DVT prophylaxis at this time IMPROVE SCORE 1. No need for DVT prophylaxis at this time

## 2017-07-14 NOTE — H&P ADULT - PROBLEM SELECTOR PLAN 3
primary team to find which BP medication patient takes, as she doesn't remember  family did not answer the phone on admission  starting low dose beta blocker

## 2017-07-14 NOTE — H&P ADULT - NSHPOUTPATIENTPROVIDERS_GEN_ALL_CORE
as per patient, Dr. [address] [place], [place] (cannot find information) as per patient, Dr. Mittal, psychiatrist

## 2017-07-14 NOTE — H&P ADULT - ASSESSMENT
Patient is a 72F, AAOx2, ambulates independently, lives with son, with PMH schizophrenia, HTN and HLD brought to the ED by her sons for weakness and insomnia. Patient was seen in ED by tele-psych Dr. Ramey, who said she does not need in patient psych but a  referral for outpatient follow up with patient's psychiatrist and prescription for psychiatric medication. Admitted for UTI.
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
## 2017-07-14 NOTE — H&P ADULT - HISTORY OF PRESENT ILLNESS
Patient is a 72F, AAOx2, ambulates independently, lives with son, with PMH schizophrenia, HTN and HLD brought to the ED by her sons for weakness and insomnia. Patient was seen in ED by tele-psych Dr. Ramey, who said she does not need in patient psych but a  referral for outpatient follow up with patient's psychiatrist and prescription for psychiatric medication. Upon admission, patient states she was already treated for UTI in the ED and will be leaving tomorrow, seems anxious, alert and awake but able to answer questions appropriately, but is tearful when speaking about her son, Judah, who she states has a intracranial bleed and doesn't have much time left to live, fearing that she will outlive him. Also states it is difficult when everyone in your family dies and you have to move on, and was tearful in speaking about her  who  of lung cancer. She says it is difficult to live with schizophrenia but she sees her psychiatrist regularly, but does not remember the names or dosages of her medications for HTN and HLD. She says she takes proloxin po 1mg at night and remeron 7.5mg, and gets an IM medication every 6 weeks and was supposed to see her psychiatrist this Saturday 7/15. She denies fever, chills, SOB, palpitations, chest pain, nausea, vomiting, diarrhea or constipation and wants to leave the hospital tomorrow. Denies suicidal ideation or wanting to harm others. Called both phone numbers on file for her two sons to ask for medication list but they did not answer the phone.    PMH: as per HPI  Surgical Hx: hysterectomy  Fam Hx: non contributory  Social Hx: negative for smoking or EtoH  Allergies: NKDA

## 2017-07-14 NOTE — H&P ADULT - PROBLEM SELECTOR PLAN 2
has appt with psychiatrist on 7/15  as per tele- psych Dr. Ramey, safe for discharge with prescription for psych meds   consult  not a candidate for in patient psych  c/w prolixin and remeron

## 2017-07-14 NOTE — PROGRESS NOTE ADULT - ASSESSMENT
Patient is a 72y old  Female who presents with a chief complaint of AMS (14 Jul 2017 04:43). Reported that patient has weakness and has had insomnia  x 3 days. PMH significant for HTN, HLD, and schizophrenia. Reports feeling much better, continues treatment for UTI.

## 2017-07-14 NOTE — PROGRESS NOTE ADULT - PROBLEM SELECTOR PLAN 4
Potassium level 2.1, Phosphorus level 1.7  -Potassium and phosphorus replaced  -restarted on home potassium supplements

## 2017-07-15 DIAGNOSIS — R41.0 DISORIENTATION, UNSPECIFIED: ICD-10-CM

## 2017-07-15 LAB
ANION GAP SERPL CALC-SCNC: 13 MMOL/L — SIGNIFICANT CHANGE UP (ref 5–17)
BUN SERPL-MCNC: 15 MG/DL — SIGNIFICANT CHANGE UP (ref 7–18)
CALCIUM SERPL-MCNC: 9.1 MG/DL — SIGNIFICANT CHANGE UP (ref 8.4–10.5)
CHLORIDE SERPL-SCNC: 110 MMOL/L — HIGH (ref 96–108)
CO2 SERPL-SCNC: 21 MMOL/L — LOW (ref 22–31)
CREAT SERPL-MCNC: 1.06 MG/DL — SIGNIFICANT CHANGE UP (ref 0.5–1.3)
CULTURE RESULTS: NO GROWTH — SIGNIFICANT CHANGE UP
FOLATE SERPL-MCNC: 9.1 NG/ML — SIGNIFICANT CHANGE UP (ref 4.8–24.2)
GLUCOSE SERPL-MCNC: 157 MG/DL — HIGH (ref 70–99)
HCT VFR BLD CALC: 42.4 % — SIGNIFICANT CHANGE UP (ref 34.5–45)
HGB BLD-MCNC: 14.6 G/DL — SIGNIFICANT CHANGE UP (ref 11.5–15.5)
MCHC RBC-ENTMCNC: 32.7 PG — SIGNIFICANT CHANGE UP (ref 27–34)
MCHC RBC-ENTMCNC: 34.6 GM/DL — SIGNIFICANT CHANGE UP (ref 32–36)
MCV RBC AUTO: 94.6 FL — SIGNIFICANT CHANGE UP (ref 80–100)
PLATELET # BLD AUTO: 203 K/UL — SIGNIFICANT CHANGE UP (ref 150–400)
POTASSIUM SERPL-MCNC: 3.4 MMOL/L — LOW (ref 3.5–5.3)
POTASSIUM SERPL-SCNC: 3.4 MMOL/L — LOW (ref 3.5–5.3)
RBC # BLD: 4.48 M/UL — SIGNIFICANT CHANGE UP (ref 3.8–5.2)
RBC # FLD: 11.7 % — SIGNIFICANT CHANGE UP (ref 10.3–14.5)
SODIUM SERPL-SCNC: 144 MMOL/L — SIGNIFICANT CHANGE UP (ref 135–145)
SPECIMEN SOURCE: SIGNIFICANT CHANGE UP
VIT B12 SERPL-MCNC: 439 PG/ML — SIGNIFICANT CHANGE UP (ref 243–894)
WBC # BLD: 8.4 K/UL — SIGNIFICANT CHANGE UP (ref 3.8–10.5)
WBC # FLD AUTO: 8.4 K/UL — SIGNIFICANT CHANGE UP (ref 3.8–10.5)

## 2017-07-15 RX ADMIN — AMLODIPINE BESYLATE 10 MILLIGRAM(S): 2.5 TABLET ORAL at 05:36

## 2017-07-15 RX ADMIN — MIRTAZAPINE 7.5 MILLIGRAM(S): 45 TABLET, ORALLY DISINTEGRATING ORAL at 12:05

## 2017-07-15 RX ADMIN — Medication 10 MILLIEQUIVALENT(S): at 12:28

## 2017-07-15 RX ADMIN — FLUPHENAZINE HYDROCHLORIDE 1 MILLIGRAM(S): 1 TABLET, FILM COATED ORAL at 12:06

## 2017-07-15 RX ADMIN — CEFTRIAXONE 100 GRAM(S): 500 INJECTION, POWDER, FOR SOLUTION INTRAMUSCULAR; INTRAVENOUS at 22:00

## 2017-07-15 RX ADMIN — ATORVASTATIN CALCIUM 10 MILLIGRAM(S): 80 TABLET, FILM COATED ORAL at 22:00

## 2017-07-15 RX ADMIN — Medication 20 MILLIGRAM(S): at 05:36

## 2017-07-15 NOTE — PROGRESS NOTE ADULT - SUBJECTIVE AND OBJECTIVE BOX
VLADIMIR NELSON  MRN-[id number]    Patient is a 72y old  Female who presents with a chief complaint of AMS (2017 04:43)      HISTORY OF PRESENT ILLNESS: patient seen and examined   s doing better , more awake and alert    MEDICATIONS  (STANDING):  cefTRIAXone   IVPB 1 Gram(s) IV Intermittent every 24 hours  sodium chloride 0.45%. 1000 milliLiter(s) (50 mL/Hr) IV Continuous <Continuous>  amLODIPine   Tablet 10 milliGRAM(s) Oral daily  atorvastatin 10 milliGRAM(s) Oral at bedtime  furosemide    Tablet 20 milliGRAM(s) Oral daily  potassium chloride    Tablet ER 10 milliEquivalent(s) Oral daily  fluPHENAZine 1 milliGRAM(s) Oral daily  mirtazapine 7.5 milliGRAM(s) Oral daily      MEDICATIONS  (PRN):  diphenhydrAMINE   Capsule 50 milliGRAM(s) Oral at bedtime PRN Insomnia      Allergies    No Known Allergies    Intolerances        PAST MEDICAL & SURGICAL HISTORY:  Hypercholesteremia  Hypertension  Schizophrenia  H/O: hysterectomy      FAMILY HISTORY:  No pertinent family history in first degree relatives      SOCIAL HISTORY  Smoking History:     REVIEW OF SYSTEMS:    CONSTITUTIONAL:  Fevers [  ]  Yes  [ x ]  No                                   chills [  ]  Yes  [  x]  No                               sweats  [  ]  Yes  [ x ]  No                                fatigue [  x]  Yes  [  ]  No    HEENT:  Eyes:  Diplopia or blurred vision [  ]  Yes  [ x ]  No    ENT:                        earache  [  ]  Yes  [ x ]  No                             sore throat  [  ]  Yes  [ x ]  No                            runny nose.  [  ]  Yes  [x ]  No    CARDIOVASCULAR:       chest pain  [  ]  Yes  [  x]  No                        squeezing, tightness,  [  ]  Yes  [  x]  No                                      palpitations.  [  ]  Yes  [ x ]  No    RESPIRATORY:             Cough [  ]  Yes  [ x ]  No                                  Wheezing [  ]  Yes  [xx  ]  No                                Hemoptysis [  ]  Yes  [x  ]  No                                      Sputum [  ]  Yes  [ x ]  No                   Shortness of Breathe [  ]  Yes  [ x ]  No    GASTROINTESTINAL:      Abdominal pain  [  ]  Yes  [x  ]  No                                                    Nausea  [  ]  Yes  [ x ]  No                                                   Vomiting [  ]  Yes  [ x ]  No                                              Constipation [  ]  Yes  [ x ]  No                                                    Diarrhea [  ]  Yes  [ x ]  No    GENITOURINARY:           Incontinence [ xx ]  Yes  [  ]  No                                                Dysuria [  ]  Yes  [ x ]  No                                      Blood in Urine  [x  ]  Yes  [  ]  No                          Frequency or urgency.  [ x ]  Yes  [  ]  No    NEUROLOGIC:                     Paresthesias  [  ]  Yes  [x  ]  No                                             fasciculations  [  ]  Yes  [ x ]  No                                seizures or weakness.  [  ]  Yes  [ x ]  No                                                   Headache [  ]  Yes  [ x ]  No                                  Loss of Conciousness [  ]  Yes  [x  ]  No                                                     Insomnia [  ]  Yes  [x  ]  No    PSYCHIATRIC:    Disorder of thought or mood.  [x  ]  Yes  [  ]  No                                                           Anxiety [ x ]  Yes  [  ]  No                                                      Depression [ xx ]  Yes  [  ]  No                                                         Dementia [ x  Vital Signs Last 24 Hrs  T(C): 36.4 (15 Jul 2017 14:25), Max: 36.9 (15 Jul 2017 05:54)  T(F): 97.6 (15 Jul 2017 14:25), Max: 98.4 (15 Jul 2017 05:54)  HR: 65 (15 Jul 2017 14:25) (56 - 65)  BP: 127/62 (15 Jul 2017 14:25) (127/62 - 165/86)  BP(mean): 77 (2017 22:39) (77 - 107)  RR: 16 (15 Jul 2017 14:25) (16 - 17)  SpO2: 96% (15 Jul 2017 14:25) (95% - 96%)  I&O's Detail      PHYSICAL EXAMINATION:    GENERAL: The patient is a medium built  well-nourished __female___in no apparent distress.     HEENT: Head is normocephalic and atraumatic. Extraocular muscles are intact. Mucous membranes are moist.     NECK: Supple. jvd [  ]  Yes  [x  ]  No    LUNGS: Clear to auscultation  [x  ]  Yes  [  ]  No                               wheezing, [  ]  Yes  [ xx ]  No                                      rales  [  ]  Yes  [ x ]  No                                    rhonchi  [  ]  Yes  [x  ]  No                 Respirations labored  [  ]  Yes  [ x ]  No    HEART: Regular rate and rhythm  [x  ]  Yes  [  ]  No                                        Murmur [  ]  Yes  [  ]  No                                              rub  [  ]  Yes  [  ]  No                                          gallop  [  ]  Yes  [  ]  No    ABDOMEN:                                 Soft, [ x ]  Yes  [  ]  No                                             nontender [ x ]  Yes  [  ]  No                                             distended.[  ]  Yes  [x  ]  No                            hepatosplenomegaly ,[  ]  Yes  [  x]  No                                                    BS   [ x ]  Yes  [  ]  No    EXTREMITIES:                cyanosis, [  ]  Yes  [x  ]  No                                       clubbing,   [  ]  Yes  [x  ]  No                                               rash, [  ]  Yes  [ x ]  No                                           edema.  [  ]  Yes  [x  ]  No    NEUROLOGIC: Alert and Oriented  [x ] Person [  ] Time  [ ] Place                                    Cranial nerves intact    [ x ]  Yes  [  ]  No                                               sensory Intact  [x]  Yes  [  ]  No                                                  motor WNL   [x  ]  Yes  [  ]  No                                                Ck Paresis  [  ]  Yes  [ x ]  No  DTR  babinski+ or -      LABS:                        14.6   8.4   )-----------( 203      ( 15 Jul 2017 07:01 )             42.4     07-15    144  |  110<H>  |  15  ----------------------------<  157<H>  3.4<L>   |  21<L>  |  1.06    Ca    9.1      15 Jul 2017 07:01  Phos  1.7     14  Mg     2.0         TPro  7.3  /  Alb  3.5  /  TBili  1.0  /  DBili  x   /  AST  25  /  ALT  24  /  AlkPhos  121<H>        Urinalysis Basic - ( 2017 23:29 )    Color: Yellow / Appearance: Clear / S.030 / pH: x  Gluc: x / Ketone: Trace  / Bili: Negative / Urobili: 1   Blood: x / Protein: 30 mg/dL / Nitrite: Positive   Leuk Esterase: Moderate / RBC: 2-5 /HPF / WBC 26-50 /HPF   Sq Epi: x / Non Sq Epi: Many / Bacteria: Moderate /HPF                      MICROBIOLOGY:< from: CT Head No Cont (17 @ 23:59) >  IMPRESSION:     No acute intracranial hemorrhage, mass effect, or evidence of acute   vascular territorial infarction.    If clinical symptoms persist or worsen, more sensitive evaluation with   brain MRI may be obtained, if no contraindicationsexist.      < end of copied text >      RADIOLOGY & ADDITIONAL STUDIES:    CXR:    Ct scan chest:    ekg;    echo:

## 2017-07-16 DIAGNOSIS — E87.6 HYPOKALEMIA: ICD-10-CM

## 2017-07-16 LAB
ANION GAP SERPL CALC-SCNC: 13 MMOL/L — SIGNIFICANT CHANGE UP (ref 5–17)
BUN SERPL-MCNC: 19 MG/DL — HIGH (ref 7–18)
CALCIUM SERPL-MCNC: 9.2 MG/DL — SIGNIFICANT CHANGE UP (ref 8.4–10.5)
CHLORIDE SERPL-SCNC: 107 MMOL/L — SIGNIFICANT CHANGE UP (ref 96–108)
CO2 SERPL-SCNC: 22 MMOL/L — SIGNIFICANT CHANGE UP (ref 22–31)
CREAT SERPL-MCNC: 0.92 MG/DL — SIGNIFICANT CHANGE UP (ref 0.5–1.3)
GLUCOSE SERPL-MCNC: 123 MG/DL — HIGH (ref 70–99)
HCT VFR BLD CALC: 42.6 % — SIGNIFICANT CHANGE UP (ref 34.5–45)
HGB BLD-MCNC: 15 G/DL — SIGNIFICANT CHANGE UP (ref 11.5–15.5)
MCHC RBC-ENTMCNC: 33 PG — SIGNIFICANT CHANGE UP (ref 27–34)
MCHC RBC-ENTMCNC: 35.2 GM/DL — SIGNIFICANT CHANGE UP (ref 32–36)
MCV RBC AUTO: 93.6 FL — SIGNIFICANT CHANGE UP (ref 80–100)
PLATELET # BLD AUTO: 208 K/UL — SIGNIFICANT CHANGE UP (ref 150–400)
POTASSIUM SERPL-MCNC: 3.2 MMOL/L — LOW (ref 3.5–5.3)
POTASSIUM SERPL-SCNC: 3.2 MMOL/L — LOW (ref 3.5–5.3)
RBC # BLD: 4.56 M/UL — SIGNIFICANT CHANGE UP (ref 3.8–5.2)
RBC # FLD: 11.6 % — SIGNIFICANT CHANGE UP (ref 10.3–14.5)
SODIUM SERPL-SCNC: 142 MMOL/L — SIGNIFICANT CHANGE UP (ref 135–145)
WBC # BLD: 8.3 K/UL — SIGNIFICANT CHANGE UP (ref 3.8–10.5)
WBC # FLD AUTO: 8.3 K/UL — SIGNIFICANT CHANGE UP (ref 3.8–10.5)

## 2017-07-16 PROCEDURE — 84443 ASSAY THYROID STIM HORMONE: CPT

## 2017-07-16 PROCEDURE — 80048 BASIC METABOLIC PNL TOTAL CA: CPT

## 2017-07-16 PROCEDURE — 84100 ASSAY OF PHOSPHORUS: CPT

## 2017-07-16 PROCEDURE — 81001 URINALYSIS AUTO W/SCOPE: CPT

## 2017-07-16 PROCEDURE — 82746 ASSAY OF FOLIC ACID SERUM: CPT

## 2017-07-16 PROCEDURE — 80307 DRUG TEST PRSMV CHEM ANLYZR: CPT

## 2017-07-16 PROCEDURE — 82306 VITAMIN D 25 HYDROXY: CPT

## 2017-07-16 PROCEDURE — 83735 ASSAY OF MAGNESIUM: CPT

## 2017-07-16 PROCEDURE — 80061 LIPID PANEL: CPT

## 2017-07-16 PROCEDURE — 83036 HEMOGLOBIN GLYCOSYLATED A1C: CPT

## 2017-07-16 PROCEDURE — 93005 ELECTROCARDIOGRAM TRACING: CPT

## 2017-07-16 PROCEDURE — 36415 COLL VENOUS BLD VENIPUNCTURE: CPT

## 2017-07-16 PROCEDURE — 82607 VITAMIN B-12: CPT

## 2017-07-16 PROCEDURE — 85027 COMPLETE CBC AUTOMATED: CPT

## 2017-07-16 PROCEDURE — 70450 CT HEAD/BRAIN W/O DYE: CPT

## 2017-07-16 PROCEDURE — 87086 URINE CULTURE/COLONY COUNT: CPT

## 2017-07-16 PROCEDURE — 99285 EMERGENCY DEPT VISIT HI MDM: CPT | Mod: 25

## 2017-07-16 PROCEDURE — 80053 COMPREHEN METABOLIC PANEL: CPT

## 2017-07-16 RX ORDER — POTASSIUM CHLORIDE 20 MEQ
20 PACKET (EA) ORAL DAILY
Qty: 0 | Refills: 0 | Status: DISCONTINUED | OUTPATIENT
Start: 2017-07-16 | End: 2017-07-17

## 2017-07-16 RX ORDER — POTASSIUM CHLORIDE 20 MEQ
10 PACKET (EA) ORAL
Qty: 0 | Refills: 0 | Status: COMPLETED | OUTPATIENT
Start: 2017-07-16 | End: 2017-07-16

## 2017-07-16 RX ORDER — POTASSIUM CHLORIDE 20 MEQ
20 PACKET (EA) ORAL ONCE
Qty: 0 | Refills: 0 | Status: COMPLETED | OUTPATIENT
Start: 2017-07-16 | End: 2017-07-16

## 2017-07-16 RX ADMIN — Medication 50 MILLIGRAM(S): at 21:42

## 2017-07-16 RX ADMIN — Medication 10 MILLIEQUIVALENT(S): at 11:59

## 2017-07-16 RX ADMIN — FLUPHENAZINE HYDROCHLORIDE 1 MILLIGRAM(S): 1 TABLET, FILM COATED ORAL at 11:59

## 2017-07-16 RX ADMIN — MIRTAZAPINE 7.5 MILLIGRAM(S): 45 TABLET, ORALLY DISINTEGRATING ORAL at 11:59

## 2017-07-16 RX ADMIN — Medication 20 MILLIGRAM(S): at 06:29

## 2017-07-16 RX ADMIN — ATORVASTATIN CALCIUM 10 MILLIGRAM(S): 80 TABLET, FILM COATED ORAL at 21:43

## 2017-07-16 RX ADMIN — Medication 100 MILLIEQUIVALENT(S): at 16:00

## 2017-07-16 RX ADMIN — Medication 100 MILLIEQUIVALENT(S): at 14:54

## 2017-07-16 RX ADMIN — AMLODIPINE BESYLATE 10 MILLIGRAM(S): 2.5 TABLET ORAL at 06:28

## 2017-07-16 RX ADMIN — Medication 20 MILLIEQUIVALENT(S): at 18:18

## 2017-07-16 RX ADMIN — Medication 20 MILLIEQUIVALENT(S): at 21:42

## 2017-07-16 NOTE — PROGRESS NOTE ADULT - SUBJECTIVE AND OBJECTIVE BOX
VLADIMIR NELSON  [id number]    Patient is a 72y old  Female who presents with a chief complaint of AMS (14 Jul 2017 04:43)      HISTORY OF PRESENT ILLNESS:  patient seen and examined   s no c/o offered     MEDICATIONS  (STANDING):  cefTRIAXone   IVPB 1 Gram(s) IV Intermittent every 24 hours  sodium chloride 0.45%. 1000 milliLiter(s) (50 mL/Hr) IV Continuous <Continuous>  amLODIPine   Tablet 10 milliGRAM(s) Oral daily  atorvastatin 10 milliGRAM(s) Oral at bedtime  furosemide    Tablet 20 milliGRAM(s) Oral daily  fluPHENAZine 1 milliGRAM(s) Oral daily  mirtazapine 7.5 milliGRAM(s) Oral daily  potassium chloride    Tablet ER 20 milliEquivalent(s) Oral daily  potassium chloride  10 mEq/100 mL IVPB 10 milliEquivalent(s) IV Intermittent every 1 hour      MEDICATIONS  (PRN):  diphenhydrAMINE   Capsule 50 milliGRAM(s) Oral at bedtime PRN Insomnia      Allergies    No Known Allergies    Intolerances        PAST MEDICAL & SURGICAL HISTORY:  Hypercholesteremia  Hypertension  Schizophrenia  H/O: hysterectomy      FAMILY HISTORY:  No pertinent family history in first degree relatives      SOCIAL HISTORY  Smoking History:     REVIEW OF SYSTEMS:    CONSTITUTIONAL:  Fevers [  ]  Yes  [  x]  No                                   chills [  ]  Yes  [ x ]  No                               sweats  [  ]  Yes  [ x ]  No                                fatigue [ x ]  Yes  [  ]  No    HEENT:  Eyes:  Diplopia or blurred vision [  ]  Yes  [  x]  No    ENT:                        earache  [  ]  Yes  [ x ]  No                             sore throat  [  ]  Yes  [ x ]  No                            runny nose.  [  ]  Yes  [ x]  No    CARDIOVASCULAR:       chest pain  [  ]  Yes  [  x]  No                        squeezing, tightness,  [  ]  Yes  [ x ]  No                                      palpitations.  [  ]  Yes  [x  ]  No    RESPIRATORY:             Cough [  ]  Yes  [x  ]  No                                  Wheezing [  ]  Yes  [  ]  No                                Hemoptysis [  ]  Yes  [ x ]  No                                      Sputum [  ]  Yes  [  x]  No                   Shortness of Breathe [  ]  Yes  x[  ]  No    GASTROINTESTINAL:      Abdominal pain  [  ]  Yes  [x]  No                                                    Nausea  [  ]  Yes  [ x ]  No                                                   Vomiting [  ]  Yes  [ x ]  No                                              Constipation [  ]  Yes  [ x ]  No                                                    Diarrhea [  ]  Yes  [x  ]  No    GENITOURINARY:           Incontinence [  x]  Yes  [  ]  No                                                Dysuria [  ]  Yes  [  ]  No                                      Blood in Urine  [  ]  Yes  [  ]  No                          Frequency or urgency.  [  ]  Yes  [  ]  No    NEUROLOGIC:                     Paresthesias  [  ]  Yes  [  ]  No                                             fasciculations  [  ]  Yes  [  ]  No                                seizures or weakness.  [  ]  Yes  [  ]  No                                                   Headache [  ]  Yes  [  ]  No                                  Loss of Conciousness [  ]  Yes  [  ]  No                                                     Insomnia [  ]  Yes  [  ]  No    PSYCHIATRIC:    confused ,Disorder of thought or mood.  [  ]  Yes  [  ]  No                                                           Anxiety [  ]  Yes  [  ]  No                                                      Depression [  ]  Yes  [  ]  No                                                         Dementia [  ]  Yes  [  ]  No    Vital Signs Last 24 Hrs  T(C): 36.4 (16 Jul 2017 14:53), Max: 36.9 (15 Jul 2017 21:55)  T(F): 97.6 (16 Jul 2017 14:53), Max: 98.5 (15 Jul 2017 21:55)  HR: 82 (16 Jul 2017 14:53) (58 - 85)  BP: 149/74 (16 Jul 2017 14:53) (129/85 - 149/74)  BP(mean): --  RR: 16 (16 Jul 2017 14:53) (16 - 16)  SpO2: 96% (16 Jul 2017 14:53) (96% - 100%)  I&O's Detail      PHYSICAL EXAMINATION:    GENERAL: The patient is a well-developed, male ___in no apparent distress.     HEENT: Head is normocephalic and atraumatic. Extraocular muscles are intact. Mucous membranes are moist.     NECK: Supple. jvd [  ]  Yes  [x  ]  No    LUNGS: Clear to auscultation  [ x ]  Yes  [  ]  No                               wheezing, [  ]  Yes  [  x]  No                                      rales  [  ]  Yes  [  x]  No                                    rhonchi  [  ]  Yes  [  x]  No                 Respirations labored  [  ]  Yes  [x  ]  No    HEART: Regular rate and rhythm  [ x ]  Yes  [  ]  No                                        Murmur [  ]  Yes  [ x ]  No                                              rub  [  ]  Yes  [x  ]  No                                          gallop  [  ]  Yes  [x  ]  No    ABDOMEN:                                 Soft, [ x ]  Yes  [  ]  No                                             nontender [ x ]  Yes  [  ]  No                                             distended.[ ]  Yes  [x  ]  No                            hepatosplenomegaly ,[  ]  Yes  [  ]  No                                                    BS   [ x ]  Yes  [  ]  No    EXTREMITIES:                cyanosis, [  ]  Yes  [ x ]  No                                       clubbing,   [  ]  Yes  [x  ]  No                                               rash, [  ]  Yes  [ x ]  No                                           edema.  [  ]  Yes  [ x ]  No    NEUROLOGIC confused : Alert and Oriented  [  ] Person [  ] Time  [ ] Place                                    Cranial nerves intact    [  ]  Yes  [  ]  No                                               sensory Intact  [  ]  Yes  [  ]  No                                                  motor WNL   [  ]  Yes  [  ]  No                                                Ck Paresis  [  ]  Yes  [  ]  No  DTR  babinski+ or -      LABS:                        15.0   8.3   )-----------( 208      ( 16 Jul 2017 07:05 )             42.6     07-16    142  |  107  |  19<H>  ----------------------------<  123<H>  3.2<L>   |  22  |  0.92    Ca    9.2      16 Jul 2017 07:05                          MICROBIOLOGY:Culture - Urine (07.14.17 @ 13:55)    Specimen Source: .Urine Clean Catch (Midstream)    Culture Results:   No growth        RADIOLOGY & ADDITIONAL STUDIES:Culture - Urine (07.14.17 @ 13:55)    Specimen Source: .Urine Clean Catch (Midstream)    Culture Results:   No growth        CXR:    Ct scan chest:    ekg;    echo: VLADIMIR NELSON  [id number]    Patient is a 72y old  Female who presents with a chief complaint of AMS (14 Jul 2017 04:43)      HISTORY OF PRESENT ILLNESS:  patient seen and examined   s no c/o offered     MEDICATIONS  (STANDING):  cefTRIAXone   IVPB 1 Gram(s) IV Intermittent every 24 hours  sodium chloride 0.45%. 1000 milliLiter(s) (50 mL/Hr) IV Continuous <Continuous>  amLODIPine   Tablet 10 milliGRAM(s) Oral daily  atorvastatin 10 milliGRAM(s) Oral at bedtime  furosemide    Tablet 20 milliGRAM(s) Oral daily  fluPHENAZine 1 milliGRAM(s) Oral daily  mirtazapine 7.5 milliGRAM(s) Oral daily  potassium chloride    Tablet ER 20 milliEquivalent(s) Oral daily  potassium chloride  10 mEq/100 mL IVPB 10 milliEquivalent(s) IV Intermittent every 1 hour      MEDICATIONS  (PRN):  diphenhydrAMINE   Capsule 50 milliGRAM(s) Oral at bedtime PRN Insomnia      Allergies    No Known Allergies    Intolerances        PAST MEDICAL & SURGICAL HISTORY:  Hypercholesteremia  Hypertension  Schizophrenia  H/O: hysterectomy      FAMILY HISTORY:  No pertinent family history in first degree relatives      SOCIAL HISTORY  Smoking History:     REVIEW OF SYSTEMS:    CONSTITUTIONAL:  Fevers [  ]  Yes  [  x]  No                                   chills [  ]  Yes  [ x ]  No                               sweats  [  ]  Yes  [ x ]  No                                fatigue [ x ]  Yes  [  ]  No    HEENT:  Eyes:  Diplopia or blurred vision [  ]  Yes  [  x]  No    ENT:                        earache  [  ]  Yes  [ x ]  No                             sore throat  [  ]  Yes  [ x ]  No                            runny nose.  [  ]  Yes  [ x]  No    CARDIOVASCULAR:       chest pain  [  ]  Yes  [  x]  No                        squeezing, tightness,  [  ]  Yes  [ x ]  No                                      palpitations.  [  ]  Yes  [x  ]  No    RESPIRATORY:             Cough [  ]  Yes  [x  ]  No                                  Wheezing [  ]  Yes  [  ]  No                                Hemoptysis [  ]  Yes  [ x ]  No                                      Sputum [  ]  Yes  [  x]  No                   Shortness of Breathe [  ]  Yes  x[  ]  No    GASTROINTESTINAL:      Abdominal pain  [  ]  Yes  [x]  No                                                    Nausea  [  ]  Yes  [ x ]  No                                                   Vomiting [  ]  Yes  [ x ]  No                                              Constipation [  ]  Yes  [ x ]  No                                                    Diarrhea [  ]  Yes  [x  ]  No    GENITOURINARY:           Incontinence [  x]  Yes  [  ]  No                                                Dysuria [  ]  Yes  [  ]  No                                      Blood in Urine  [  ]  Yes  [  ]  No                          Frequency or urgency.  [  ]  Yes  [  ]  No    NEUROLOGIC:                     Paresthesias  [  ]  Yes  [  ]  No                                             fasciculations  [  ]  Yes  [  ]  No                                seizures or weakness.  [  ]  Yes  [  ]  No                                                   Headache [  ]  Yes  [  ]  No                                  Loss of Conciousness [  ]  Yes  [  ]  No                                                     Insomnia [  ]  Yes  [  ]  No    PSYCHIATRIC:    confused ,Disorder of thought or mood.  [  ]  Yes  [  ]  No                                                           Anxiety [  ]  Yes  [  ]  No                                                      Depression [  ]  Yes  [  ]  No                                                         Dementia [  ]  Yes  [  ]  No    Vital Signs Last 24 Hrs  T(C): 36.4 (16 Jul 2017 14:53), Max: 36.9 (15 Jul 2017 21:55)  T(F): 97.6 (16 Jul 2017 14:53), Max: 98.5 (15 Jul 2017 21:55)  HR: 82 (16 Jul 2017 14:53) (58 - 85)  BP: 149/74 (16 Jul 2017 14:53) (129/85 - 149/74)  BP(mean): --  RR: 16 (16 Jul 2017 14:53) (16 - 16)  SpO2: 96% (16 Jul 2017 14:53) (96% - 100%)  I&O's Detail      PHYSICAL EXAMINATION:    GENERAL: The patient is a well-developed, female ___in no apparent distress.     HEENT: Head is normocephalic and atraumatic. Extraocular muscles are intact. Mucous membranes are moist.     NECK: Supple. jvd [  ]  Yes  [x  ]  No    LUNGS: Clear to auscultation  [ x ]  Yes  [  ]  No                               wheezing, [  ]  Yes  [  x]  No                                      rales  [  ]  Yes  [  x]  No                                    rhonchi  [  ]  Yes  [  x]  No                 Respirations labored  [  ]  Yes  [x  ]  No    HEART: Regular rate and rhythm  [ x ]  Yes  [  ]  No                                        Murmur [  ]  Yes  [ x ]  No                                              rub  [  ]  Yes  [x  ]  No                                          gallop  [  ]  Yes  [x  ]  No    ABDOMEN:                                 Soft, [ x ]  Yes  [  ]  No                                             nontender [ x ]  Yes  [  ]  No                                             distended.[ ]  Yes  [x  ]  No                            hepatosplenomegaly ,[  ]  Yes  [  ]  No                                                    BS   [ x ]  Yes  [  ]  No    EXTREMITIES:                cyanosis, [  ]  Yes  [ x ]  No                                       clubbing,   [  ]  Yes  [x  ]  No                                               rash, [  ]  Yes  [ x ]  No                                           edema.  [  ]  Yes  [ x ]  No    NEUROLOGIC confused : Alert and Oriented  [  ] Person [  ] Time  [ ] Place                                    Cranial nerves intact    [  ]  Yes  [  ]  No                                               sensory Intact  [  ]  Yes  [  ]  No                                                  motor WNL   [  ]  Yes  [  ]  No                                                Ck Paresis  [  ]  Yes  [  ]  No  DTR  babinski+ or -      LABS:                        15.0   8.3   )-----------( 208      ( 16 Jul 2017 07:05 )             42.6     07-16    142  |  107  |  19<H>  ----------------------------<  123<H>  3.2<L>   |  22  |  0.92    Ca    9.2      16 Jul 2017 07:05                          MICROBIOLOGY:Culture - Urine (07.14.17 @ 13:55)    Specimen Source: .Urine Clean Catch (Midstream)    Culture Results:   No growth        RADIOLOGY & ADDITIONAL STUDIES:Culture - Urine (07.14.17 @ 13:55)    Specimen Source: .Urine Clean Catch (Midstream)    Culture Results:   No growth        CXR:    Ct scan chest:    ekg;    echo:

## 2017-07-17 ENCOUNTER — TRANSCRIPTION ENCOUNTER (OUTPATIENT)
Age: 72
End: 2017-07-17

## 2017-07-17 VITALS
OXYGEN SATURATION: 97 % | DIASTOLIC BLOOD PRESSURE: 63 MMHG | RESPIRATION RATE: 14 BRPM | HEART RATE: 70 BPM | TEMPERATURE: 98 F | SYSTOLIC BLOOD PRESSURE: 148 MMHG

## 2017-07-17 DIAGNOSIS — F20.3 UNDIFFERENTIATED SCHIZOPHRENIA: ICD-10-CM

## 2017-07-17 PROCEDURE — 99222 1ST HOSP IP/OBS MODERATE 55: CPT

## 2017-07-17 RX ORDER — ATORVASTATIN CALCIUM 80 MG/1
1 TABLET, FILM COATED ORAL
Qty: 30 | Refills: 0 | OUTPATIENT
Start: 2017-07-17 | End: 2017-08-16

## 2017-07-17 RX ORDER — HALOPERIDOL DECANOATE 100 MG/ML
2 INJECTION INTRAMUSCULAR EVERY 6 HOURS
Qty: 0 | Refills: 0 | Status: DISCONTINUED | OUTPATIENT
Start: 2017-07-17 | End: 2017-07-17

## 2017-07-17 NOTE — DISCHARGE NOTE ADULT - PATIENT PORTAL LINK FT
“You can access the [facility] Patient Portal, offered by [facility], by registering with the following website: [email or web address]”

## 2017-07-17 NOTE — DISCHARGE NOTE ADULT - CARE PLAN
Principal Discharge DX:	Acute cystitis with hematuria  Goal:	resolved  Instructions for follow-up, activity and diet:	Followup with PMD  Secondary Diagnosis:	Hypercholesteremia  Goal:	appropriate management  Instructions for follow-up, activity and diet:	Continue with statin  Secondary Diagnosis:	Schizophrenia  Goal:	adequate management  Instructions for follow-up, activity and diet:	Continue with medication, and followup with private psychiatrist  Secondary Diagnosis:	Hypertension  Goal:	<140/90  Instructions for follow-up, activity and diet:	Continue with antihypertensive medication and followup with PMD

## 2017-07-17 NOTE — BEHAVIORAL HEALTH ASSESSMENT NOTE - NSBHCHARTREVIEWLAB_PSY_A_CORE FT
15.0   8.3   )-----------( 208      ( 16 Jul 2017 07:05 )             42.6     07-16    142  |  107  |  19<H>  ----------------------------<  123<H>  3.2<L>   |  22  |  0.92    Ca    9.2      16 Jul 2017 07:05        TSH    B12  Vitamin B12, Serum: 439 pg/mL (07-14-17 @ 10:08)    Folate  Folate, Serum: 9.1 ng/mL (07-14-17 @ 10:08)

## 2017-07-17 NOTE — DISCHARGE NOTE ADULT - PLAN OF CARE
resolved Followup with PMD appropriate management Continue with statin adequate management Continue with medication, and followup with private psychiatrist <140/90 Continue with antihypertensive medication and followup with PMD

## 2017-07-17 NOTE — BEHAVIORAL HEALTH ASSESSMENT NOTE - SUMMARY
Patient is a 72F, AAOx2, ambulates independently, lives with son, with PMH schizophrenia, HTN and HLD brought to the ED by her sons for weakness and insomnia.  pt with long h/o schizophrenia with inpt psych in the 80's, maintained in outpt psych treatment since then.  pt seems to be at baseline. ED contacted outpt treatment team who say pt is at baseline.   no acute psychiatric sxs.  follow up with outpt psychiatrist and therapist.  repeat EKG since QTC prolonged.   continue current psych meds.

## 2017-07-17 NOTE — DISCHARGE NOTE ADULT - ADDITIONAL INSTRUCTIONS
Please followup with private psychiatrist and it is recommended that patient have a repeat EKG in outpatient setting to monitor QTC prolongation.

## 2017-07-17 NOTE — BEHAVIORAL HEALTH ASSESSMENT NOTE - HPI (INCLUDE ILLNESS QUALITY, SEVERITY, DURATION, TIMING, CONTEXT, MODIFYING FACTORS, ASSOCIATED SIGNS AND SYMPTOMS)
Patient is a 72F, AAOx2, ambulates independently, lives with son, with PMH schizophrenia, HTN and HLD brought to the ED by her sons for weakness and insomnia. Pt says she feels well. because God is watching her. pt feels depressed because son is sick and can die but she tries to console herself by having mauricio in God and knowing he will be with God. denies physical sxs. not sure why she is in the hospital. sleeps well as per pt and good appetite but tries not to eat much because doesn't want to get fat as she used to be. no SI. no hallucinations. no delusions. mild anxiety. no mercedez.  Patient sees Psychiatrist Dr. Mittal and therapist Mr. Rinaldi at [facility] ([phone]). Patient sees therapist every 2 weeks and last appointment was . Patient missed her last appointment with Psychiatrist on , and patient’s next appointment with providers is 7/15. Patient has remote history of inpatient hospitalizations in the early . Patient tends to be tearful in sessions, and has been anxious about her son’s health (son Judah was diagnosed with pulmonary embolisms and does not always take his medications). No known history of SI/HI. No history of substance use. No recent hallucinations or delusions. Patient has been having difficulty sleeping on and off for approximately the past 2 year after her mom . No known changes to appetite (although patient reports in ED that she has not eaten much in a few days). Per therapist patient presented at baseline when they last met, thought maybe a little more anxious in recent months, and has historically been med compliant. Patient began taking Benadryl 50mg and Remeron 7.5mg a few months ago due to difficulty sleeping. Patient had previously been getting Prolixin IM, but this was switched to PO. No known history of trauma. Patient is prescribed Prolixin 1mg PO, Benadryl 50mg PM, Remeron 7.5mg PM. Patient used to receive Prolixin IM prior to being switched to oral.

## 2017-07-17 NOTE — DISCHARGE NOTE ADULT - HOSPITAL COURSE
Patient is a 72F, AAOx2, ambulates independently, lives with son, with PMH schizophrenia, HTN and HLD brought to the ED by her sons for weakness and insomnia. Patient was seen in ED by tele-psych Dr. Ramey, who said she does not need in patient psych but a  referral for outpatient follow up with patient's psychiatrist and prescription for psychiatric medication. Admitted for UTI.     Acute cystitis with hematuria.    afebrile, mild leukocytosis resolved. 8.7 today  Rocephin IV  Urine culture negative  Rocephin discontinued     Schizophrenia.   Pt has appt with psychiatrist on 7/15   as per tele- psych Dr. Ramey, safe for discharge with prescription for psych meds   consult  not a candidate for in patient psych  Pt was c/w prolixin and remeron.   Dr. Mathew, psychiatrist, evaluated and stated QTC prolonged on EKG and recommended repeat EKG.  Pt refused EKG.  Dr. Mathew further stated that patient was at her baseline  and recommended discharge home with home meds and followup with patient's outpatient psychiatrist    Hypertension.   Restarted on home medication regimen  Blood pressure monitored/DASH diet    Hypercholesteremia.   atorvastatin   lipid profile normal    Need for prophylactic measure.     improve score 1- no dvt ppx indicated.     Attending cleared patient for discharge home with son and followup with patient's private psychiatrist.

## 2017-07-17 NOTE — BEHAVIORAL HEALTH ASSESSMENT NOTE - NSBHCHARTREVIEWINVESTIGATE_PSY_A_CORE FT
Ventricular Rate 74 BPM    Atrial Rate 74 BPM    P-R Interval 118 ms    QRS Duration 80 ms     ms    QTc 561 ms    P Axis 49 degrees    R Axis 54 degrees    T Axis 68 degrees    Diagnosis Line *** Poor data quality, interpretation may be adversely affected  Normal sinus rhythm  Nonspecific ST and T wave abnormality  Prolonged QT  Abnormal ECG

## 2017-07-17 NOTE — PROGRESS NOTE ADULT - ATTENDING COMMENTS
continue other meds and care
if cleared by psychiatry then ID home home care
continue other care   dvt ,gi prophylasix

## 2017-07-17 NOTE — BEHAVIORAL HEALTH ASSESSMENT NOTE - NSBHCHARTREVIEWIMAGING_PSY_A_CORE FT
EXAM:  CT BRAIN                            PROCEDURE DATE:  07/13/2017          INTERPRETATION:  CLINICAL INFORMATION: Confusion.    COMPARISON: None available.    PROCEDURE:   Noncontrast CT of the Head was performed from the skull base to the   vertex. Coronal and Sagittal reformats were obtained.    FINDINGS:    There is no acute intracranial hemorrhage, mass effect, midline shift,   extra-axial collection, hydrocephalus, or evidence of acute vascular   territorial infarction. Mild patchy hypodensities within the   periventricular and subcortical white matter, although nonspecific,   likely reflect chronic microvascular disease.    The visualized paranasal sinuses and mastoid air cells are clear.   Visualized osseous structures are intact.    IMPRESSION:     No acute intracranial hemorrhage, mass effect, or evidence of acute   vascular territorial infarction.

## 2017-07-17 NOTE — BEHAVIORAL HEALTH ASSESSMENT NOTE - NSBHCHARTREVIEWVS_PSY_A_CORE FT
Vital Signs Last 24 Hrs  T(C): 36.7 (17 Jul 2017 05:30), Max: 36.8 (16 Jul 2017 20:40)  T(F): 98.1 (17 Jul 2017 05:30), Max: 98.3 (16 Jul 2017 20:40)  HR: 72 (17 Jul 2017 05:30) (69 - 82)  BP: 146/82 (17 Jul 2017 05:30) (140/77 - 170/65)  BP(mean): --  RR: 16 (17 Jul 2017 05:30) (14 - 16)  SpO2: 94% (17 Jul 2017 05:30) (94% - 97%)

## 2017-07-17 NOTE — PROGRESS NOTE ADULT - SUBJECTIVE AND OBJECTIVE BOX
VLADIMIR NELSON  MRN-[id number]    Patient is a 72y old  Female who presents with a chief complaint of AMS (14 Jul 2017 04:43)      HISTORY OF PRESENT ILLNESS:  s doing on   MEDICATIONS  (STANDING):  sodium chloride 0.45%. 1000 milliLiter(s) (50 mL/Hr) IV Continuous <Continuous>  amLODIPine   Tablet 10 milliGRAM(s) Oral daily  atorvastatin 10 milliGRAM(s) Oral at bedtime  furosemide    Tablet 20 milliGRAM(s) Oral daily  fluPHENAZine 1 milliGRAM(s) Oral daily  mirtazapine 7.5 milliGRAM(s) Oral daily  potassium chloride    Tablet ER 20 milliEquivalent(s) Oral daily      MEDICATIONS  (PRN):  diphenhydrAMINE   Capsule 50 milliGRAM(s) Oral at bedtime PRN Insomnia  haloperidol    Injectable 2 milliGRAM(s) IntraMuscular every 6 hours PRN Agitation  LORazepam   Injectable 2 milliGRAM(s) IntraMuscular every 4 hours PRN Agitation      Allergies    No Known Allergies    Intolerances        PAST MEDICAL & SURGICAL HISTORY:  Hypercholesteremia  Hypertension  Schizophrenia  H/O: hysterectomy      FAMILY HISTORY:  No pertinent family history in first degree relatives      SOCIAL HISTORY  Smoking History:     REVIEW OF SYSTEMS:    CONSTITUTIONAL:  Fevers [  ]  Yes  [x  ]  No                                   chills [  ]  Yes  [x  ]  No                               sweats  [  ]  Yes  [ x ]  No                                fatigue [  ]  Yes  [ x ]  No    HEENT:  Eyes:  Diplopia or blurred vision [  ]  Yes  x ]  No    ENT:                        earache  [  ]  Yes  x[  ]  No                             sore throat  [  ]  Yes  [x  ]  No                            runny nose.  [  ]  Yes  [ x ]  No    CARDIOVASCULAR:       chest pain  [  ]  Yes  [ x ]  No                        squeezing, tightness,  [  ]  Yes  [ xx ]  No                                      palpitations.  [  ]  Yes  [ x ]  No    RESPIRATORY:             Cough [  ]  Yes  [x  ]  No                                  Wheezing [  ]  Yes  [x  ]  No                                Hemoptysis [  ]  Yes  [ x ]  No                                      Sputum [  ]  Yes  [  ]x  No                   Shortness of Breathe [  ]  Yes  [ x ]  No    GASTROINTESTINAL:      Abdominal pain  [  ]  Yes  [ x ]  No                                                    Nausea  [  ]  Yes  [x  ]  No                                                   Vomiting [  ]  Yes  [x  ]  No                                              Constipation [  ]  Yes  [x  ]  No                                                    Diarrhea [  ]  Yes  x[  ]  No    GENITOURINARY:           Incontinence [  ]  Yes  [ x ]  No                                                Dysuria [  ]  Yes  [ x ]  No                                      Blood in Urine  [  ]  Yes  [xx  ]  No                          Frequency or urgency.  [  ]  Yes  [  ]  No    NEUROLOGIC:                     Paresthesias  [  ]  Yes  [x  ]  No                                             fasciculations  [  ]  Yes  [ x ]  No                                seizures or weakness.  [  ]  Yes  [ x ]  No                                                   Headache [  ]  Yes  [ x ]  No                                  Loss of Conciousness [  ]  Yes  [ x ]  No                                                     Insomnia [  ]  Yes  [  x]  No    PSYCHIATRIC:    Disorder of thought or mood.  [x  ]  Yes  [  ]  No                                                           Anxiety [  ]  Yes  [  ]  No                                                      Depression [ x ]  Yes  [  ]  No                                                         Dementia [  ]  Yes  [x  ]  No    Vital Signs Last 24 Hrs  T(C): 36.7 (17 Jul 2017 05:30), Max: 36.8 (16 Jul 2017 20:40)  T(F): 98.1 (17 Jul 2017 05:30), Max: 98.3 (16 Jul 2017 20:40)  HR: 72 (17 Jul 2017 05:30) (69 - 82)  BP: 146/82 (17 Jul 2017 05:30) (140/77 - 170/65)  BP(mean): --  RR: 16 (17 Jul 2017 05:30) (14 - 16)  SpO2: 94% (17 Jul 2017 05:30) (94% - 97%)  I&O's Detail      PHYSICAL EXAMINATION:    GENERAL: The patient is a well-developed, well-nourished _____in no apparent distress.     HEENT: Head is normocephalic and atraumatic. Extraocular muscles are intact. Mucous membranes are moist.     NECK: Supple. jvd [  ]  Yes  [ x ]  No    LUNGS: Clear to auscultation  [x  ]  Yes  [  ]  No                               wheezing, [  ]  Yes  [x  ]  No                                      rales  [  ]  Yes  [x  ]  No                                    rhonchi  [  ]  Yes  [ x ]  No                 Respirations labored  [  ]  Yes  [ x ]  No    HEART: Regular rate and rhythm  [ x ]  Yes  [  ]  No                                        Murmur [  ]  Yes  [ x ]  No                                              rub  [  ]  Yes  [ x ]  No                                          gallop  [  ]  Yes  [ x ]  No    ABDOMEN:                                 Soft, [x  ]  Yes  [  ]  No                                             nontender [x ]  Yes  [  ]  No                                             distended.[  ]  Yes  [ x ]  No                            hepatosplenomegaly ,[  ]  Yes  [ x ]  No                                                    BS   [  x]  Yes  [  ]  No    EXTREMITIES:                cyanosis, [  ]  Yes  [ x ]  No                                       clubbing,   [  ]  Yes  [x  ]  No                                               rash, [  ]  Yes  [x  ]  No                                           edema.  [  ]  Yes  [x  ]  No    NEUROLOGIC: Alert and Oriented  [ x ] Person [ x ] Time  [x ] Place                                    Cranial nerves intact    [ x ]  Yes  [  ]  No                                               sensory Intact  [ x ]  Yes  [  ]  No                                                  motor WNL   [  x]  Yes  [  ]  No                                                Ck Paresis  [  ]  Yes  [ x ]  No  DTR  babinski+ or -      LABS:                        15.0   8.3   )-----------( 208      ( 16 Jul 2017 07:05 )             42.6     07-16    142  |  107  |  19<H>  ----------------------------<  123<H>  3.2<L>   |  22  |  0.92    Ca    9.2      16 Jul 2017 07:05                          MICROBIOLOGY:    RADIOLOGY & ADDITIONAL STUDIES:    CXR:    Ct scan chest:    ekg;    echo:

## 2017-07-17 NOTE — DISCHARGE NOTE ADULT - MEDICATION SUMMARY - MEDICATIONS TO TAKE
I will START or STAY ON the medications listed below when I get home from the hospital:    Remeron 15 mg oral tablet  -- 0.5 tab(s) by mouth once a day (at bedtime)  -- Indication: For Schizophrenia    atorvastatin 10 mg oral tablet  -- 1 tab(s) by mouth once a day (at bedtime)  -- Indication: For HLD    Prolixin  -- 1 milligram(s) by mouth once a day (at bedtime)  -- Indication: For Schizophrenia    Norvasc 10 mg oral tablet  -- 1 tab(s) by mouth once a day  -- Indication: For HTN    furosemide 20 mg oral tablet  -- 1 tab(s) by mouth once a day  -- Indication: For HTN

## 2017-07-20 DIAGNOSIS — E78.00 PURE HYPERCHOLESTEROLEMIA, UNSPECIFIED: ICD-10-CM

## 2017-07-20 DIAGNOSIS — N30.01 ACUTE CYSTITIS WITH HEMATURIA: ICD-10-CM

## 2017-07-20 DIAGNOSIS — F20.9 SCHIZOPHRENIA, UNSPECIFIED: ICD-10-CM

## 2017-07-20 DIAGNOSIS — I10 ESSENTIAL (PRIMARY) HYPERTENSION: ICD-10-CM

## 2017-07-20 DIAGNOSIS — G47.00 INSOMNIA, UNSPECIFIED: ICD-10-CM

## 2017-09-13 ENCOUNTER — INPATIENT (INPATIENT)
Facility: HOSPITAL | Age: 72
LOS: 0 days | Discharge: STILL ADMITTED OR ADMINISTRATIVE | End: 2017-09-13
Attending: PSYCHIATRY & NEUROLOGY | Admitting: PSYCHIATRY & NEUROLOGY
Payer: MEDICARE

## 2017-09-13 ENCOUNTER — EMERGENCY (EMERGENCY)
Facility: HOSPITAL | Age: 72
LOS: 1 days | Discharge: STILL ADMITTED OR ADMINISTRATIVE | End: 2017-09-13
Admitting: EMERGENCY MEDICINE
Payer: MEDICARE

## 2017-09-13 ENCOUNTER — INPATIENT (INPATIENT)
Facility: HOSPITAL | Age: 72
LOS: 97 days | Discharge: HOME HEALTH | End: 2017-12-20
Attending: PSYCHIATRY & NEUROLOGY | Admitting: STUDENT IN AN ORGANIZED HEALTH CARE EDUCATION/TRAINING PROGRAM
Payer: MEDICARE

## 2017-09-13 VITALS
HEART RATE: 66 BPM | RESPIRATION RATE: 18 BRPM | DIASTOLIC BLOOD PRESSURE: 100 MMHG | OXYGEN SATURATION: 96 % | SYSTOLIC BLOOD PRESSURE: 220 MMHG

## 2017-09-13 VITALS
SYSTOLIC BLOOD PRESSURE: 208 MMHG | RESPIRATION RATE: 20 BRPM | DIASTOLIC BLOOD PRESSURE: 94 MMHG | OXYGEN SATURATION: 98 % | HEART RATE: 90 BPM | TEMPERATURE: 97 F

## 2017-09-13 VITALS — OXYGEN SATURATION: 98 % | HEART RATE: 65 BPM | TEMPERATURE: 98 F

## 2017-09-13 DIAGNOSIS — Z90.710 ACQUIRED ABSENCE OF BOTH CERVIX AND UTERUS: Chronic | ICD-10-CM

## 2017-09-13 DIAGNOSIS — F33.9 MAJOR DEPRESSIVE DISORDER, RECURRENT, UNSPECIFIED: ICD-10-CM

## 2017-09-13 DIAGNOSIS — R69 ILLNESS, UNSPECIFIED: ICD-10-CM

## 2017-09-13 DIAGNOSIS — F20.9 SCHIZOPHRENIA, UNSPECIFIED: ICD-10-CM

## 2017-09-13 LAB
ALBUMIN SERPL ELPH-MCNC: 4.3 G/DL — SIGNIFICANT CHANGE UP (ref 3.3–5)
ALP SERPL-CCNC: 99 U/L — SIGNIFICANT CHANGE UP (ref 40–120)
ALT FLD-CCNC: 22 U/L — SIGNIFICANT CHANGE UP (ref 4–33)
APAP SERPL-MCNC: < 15 UG/ML — LOW (ref 15–25)
AST SERPL-CCNC: 25 U/L — SIGNIFICANT CHANGE UP (ref 4–32)
BARBITURATES MEASUREMENT: NEGATIVE — SIGNIFICANT CHANGE UP
BASOPHILS # BLD AUTO: 0.01 K/UL — SIGNIFICANT CHANGE UP (ref 0–0.2)
BASOPHILS NFR BLD AUTO: 0.1 % — SIGNIFICANT CHANGE UP (ref 0–2)
BENZODIAZ SERPL-MCNC: NEGATIVE — SIGNIFICANT CHANGE UP
BILIRUB SERPL-MCNC: 1 MG/DL — SIGNIFICANT CHANGE UP (ref 0.2–1.2)
BUN SERPL-MCNC: 15 MG/DL — SIGNIFICANT CHANGE UP (ref 7–23)
CALCIUM SERPL-MCNC: 9.5 MG/DL — SIGNIFICANT CHANGE UP (ref 8.4–10.5)
CHLORIDE SERPL-SCNC: 104 MMOL/L — SIGNIFICANT CHANGE UP (ref 98–107)
CO2 SERPL-SCNC: 22 MMOL/L — SIGNIFICANT CHANGE UP (ref 22–31)
CREAT SERPL-MCNC: 0.87 MG/DL — SIGNIFICANT CHANGE UP (ref 0.5–1.3)
EOSINOPHIL # BLD AUTO: 0.01 K/UL — SIGNIFICANT CHANGE UP (ref 0–0.5)
EOSINOPHIL NFR BLD AUTO: 0.1 % — SIGNIFICANT CHANGE UP (ref 0–6)
ETHANOL BLD-MCNC: < 10 MG/DL — SIGNIFICANT CHANGE UP
GLUCOSE SERPL-MCNC: 131 MG/DL — HIGH (ref 70–99)
HCT VFR BLD CALC: 42.4 % — SIGNIFICANT CHANGE UP (ref 34.5–45)
HGB BLD-MCNC: 14.1 G/DL — SIGNIFICANT CHANGE UP (ref 11.5–15.5)
IMM GRANULOCYTES # BLD AUTO: 0.02 # — SIGNIFICANT CHANGE UP
IMM GRANULOCYTES NFR BLD AUTO: 0.2 % — SIGNIFICANT CHANGE UP (ref 0–1.5)
LYMPHOCYTES # BLD AUTO: 1.31 K/UL — SIGNIFICANT CHANGE UP (ref 1–3.3)
LYMPHOCYTES # BLD AUTO: 14 % — SIGNIFICANT CHANGE UP (ref 13–44)
MCHC RBC-ENTMCNC: 30.7 PG — SIGNIFICANT CHANGE UP (ref 27–34)
MCHC RBC-ENTMCNC: 33.3 % — SIGNIFICANT CHANGE UP (ref 32–36)
MCV RBC AUTO: 92.4 FL — SIGNIFICANT CHANGE UP (ref 80–100)
MONOCYTES # BLD AUTO: 0.43 K/UL — SIGNIFICANT CHANGE UP (ref 0–0.9)
MONOCYTES NFR BLD AUTO: 4.6 % — SIGNIFICANT CHANGE UP (ref 2–14)
NEUTROPHILS # BLD AUTO: 7.58 K/UL — HIGH (ref 1.8–7.4)
NEUTROPHILS NFR BLD AUTO: 81 % — HIGH (ref 43–77)
NRBC # FLD: 0 — SIGNIFICANT CHANGE UP
PLATELET # BLD AUTO: 198 K/UL — SIGNIFICANT CHANGE UP (ref 150–400)
PMV BLD: 12.4 FL — SIGNIFICANT CHANGE UP (ref 7–13)
POTASSIUM SERPL-MCNC: 3.3 MMOL/L — LOW (ref 3.5–5.3)
POTASSIUM SERPL-SCNC: 3.3 MMOL/L — LOW (ref 3.5–5.3)
PROT SERPL-MCNC: 7.3 G/DL — SIGNIFICANT CHANGE UP (ref 6–8.3)
RBC # BLD: 4.59 M/UL — SIGNIFICANT CHANGE UP (ref 3.8–5.2)
RBC # FLD: 13.2 % — SIGNIFICANT CHANGE UP (ref 10.3–14.5)
SALICYLATES SERPL-MCNC: < 5 MG/DL — LOW (ref 15–30)
SODIUM SERPL-SCNC: 144 MMOL/L — SIGNIFICANT CHANGE UP (ref 135–145)
TSH SERPL-MCNC: 3.37 UIU/ML — SIGNIFICANT CHANGE UP (ref 0.27–4.2)
WBC # BLD: 9.36 K/UL — SIGNIFICANT CHANGE UP (ref 3.8–10.5)
WBC # FLD AUTO: 9.36 K/UL — SIGNIFICANT CHANGE UP (ref 3.8–10.5)

## 2017-09-13 PROCEDURE — 99285 EMERGENCY DEPT VISIT HI MDM: CPT

## 2017-09-13 PROCEDURE — 99284 EMERGENCY DEPT VISIT MOD MDM: CPT

## 2017-09-13 RX ORDER — MIRTAZAPINE 45 MG/1
3.25 TABLET, ORALLY DISINTEGRATING ORAL AT BEDTIME
Qty: 0 | Refills: 0 | Status: DISCONTINUED | OUTPATIENT
Start: 2017-09-13 | End: 2017-09-13

## 2017-09-13 RX ORDER — FLUPHENAZINE HYDROCHLORIDE 1 MG/1
2.5 TABLET, FILM COATED ORAL AT BEDTIME
Qty: 0 | Refills: 0 | Status: DISCONTINUED | OUTPATIENT
Start: 2017-09-13 | End: 2017-09-13

## 2017-09-13 RX ORDER — FLUPHENAZINE HYDROCHLORIDE 1 MG/1
1 TABLET, FILM COATED ORAL ONCE
Qty: 0 | Refills: 0 | Status: DISCONTINUED | OUTPATIENT
Start: 2017-09-13 | End: 2017-09-13

## 2017-09-13 RX ORDER — FLUPHENAZINE HYDROCHLORIDE 1 MG/1
2.5 TABLET, FILM COATED ORAL ONCE
Qty: 0 | Refills: 0 | Status: DISCONTINUED | OUTPATIENT
Start: 2017-09-13 | End: 2017-09-13

## 2017-09-13 RX ORDER — FLUPHENAZINE HYDROCHLORIDE 1 MG/1
2.5 TABLET, FILM COATED ORAL EVERY 6 HOURS
Qty: 0 | Refills: 0 | Status: DISCONTINUED | OUTPATIENT
Start: 2017-09-13 | End: 2017-09-13

## 2017-09-13 RX ORDER — HALOPERIDOL DECANOATE 100 MG/ML
2 INJECTION INTRAMUSCULAR ONCE
Qty: 0 | Refills: 0 | Status: COMPLETED | OUTPATIENT
Start: 2017-09-13 | End: 2017-09-13

## 2017-09-13 RX ORDER — AMLODIPINE BESYLATE 2.5 MG/1
5 TABLET ORAL DAILY
Qty: 0 | Refills: 0 | Status: DISCONTINUED | OUTPATIENT
Start: 2017-09-13 | End: 2017-09-13

## 2017-09-13 RX ORDER — DIPHENHYDRAMINE HCL 50 MG
50 CAPSULE ORAL ONCE
Qty: 0 | Refills: 0 | Status: COMPLETED | OUTPATIENT
Start: 2017-09-13 | End: 2017-09-13

## 2017-09-13 RX ORDER — POTASSIUM CHLORIDE 20 MEQ
20 PACKET (EA) ORAL ONCE
Qty: 0 | Refills: 0 | Status: DISCONTINUED | OUTPATIENT
Start: 2017-09-13 | End: 2017-09-13

## 2017-09-13 RX ORDER — AMLODIPINE BESYLATE 2.5 MG/1
5 TABLET ORAL ONCE
Qty: 0 | Refills: 0 | Status: COMPLETED | OUTPATIENT
Start: 2017-09-13 | End: 2017-09-13

## 2017-09-13 RX ORDER — FLUPHENAZINE HYDROCHLORIDE 1 MG/1
1 TABLET, FILM COATED ORAL EVERY 6 HOURS
Qty: 0 | Refills: 0 | Status: DISCONTINUED | OUTPATIENT
Start: 2017-09-13 | End: 2017-09-13

## 2017-09-13 RX ORDER — FLUPHENAZINE HYDROCHLORIDE 1 MG/1
1 TABLET, FILM COATED ORAL AT BEDTIME
Qty: 0 | Refills: 0 | Status: DISCONTINUED | OUTPATIENT
Start: 2017-09-13 | End: 2017-09-13

## 2017-09-13 RX ORDER — AMLODIPINE BESYLATE 2.5 MG/1
5 TABLET ORAL DAILY
Qty: 0 | Refills: 0 | Status: DISCONTINUED | OUTPATIENT
Start: 2017-09-14 | End: 2017-09-13

## 2017-09-13 RX ADMIN — Medication 50 MILLIGRAM(S): at 22:28

## 2017-09-13 RX ADMIN — HALOPERIDOL DECANOATE 2 MILLIGRAM(S): 100 INJECTION INTRAMUSCULAR at 22:28

## 2017-09-13 RX ADMIN — HALOPERIDOL DECANOATE 2 MILLIGRAM(S): 100 INJECTION INTRAMUSCULAR at 10:11

## 2017-09-13 RX ADMIN — Medication 50 MILLIGRAM(S): at 10:11

## 2017-09-13 RX ADMIN — AMLODIPINE BESYLATE 5 MILLIGRAM(S): 2.5 TABLET ORAL at 19:11

## 2017-09-13 RX ADMIN — Medication 1 MILLIGRAM(S): at 10:11

## 2017-09-13 RX ADMIN — Medication 2 MILLIGRAM(S): at 21:15

## 2017-09-13 NOTE — ED BEHAVIORAL HEALTH ASSESSMENT NOTE - SUMMARY
a 72 year old  female; domiciled with adult son; noncaregiver; PPH of schizophrenia; 3 prior hospitalizations last in 1980s; no known suicide attempts; no known history of violence or arrests; no active substance abuse or known history of complicated withdrawal; PMH of HTN and hypercholesterolemia brought in by son due to agitation. 72 year old  female; domiciled with adult son; noncaregiver; disabled due to mental illness; PPH of schizophrenia; 3 prior hospitalizations last in 1980s; previously in outpatient treatment for 15 years at [facility]; no known suicide attempts; no known history of violence or arrests; no active substance abuse or known history of complicated withdrawal; PMH of HTN and hypercholesterolemia, BIB EMS from home after son called 911 as EMS reports patient was violent with son & threatened suicide in context of medication non-compliance.  In ED, patient is psychotic, agitated and requiring IM medication on an emergency basis. She is not able to engage in safety planning, is clearly not caring for ADLs and is admitting to noncompliance with psychiatric medications. Per EMS, patient was violent and threatened suicide today. Collateral from son who referred patient is not available, but collateral from mental health clinic indicates patient is noncompliant with medications. Patient is not safe for discharge and will require inpatient psychiatric hospitalization for safety and stabilization.

## 2017-09-13 NOTE — ED BEHAVIORAL HEALTH ASSESSMENT NOTE - SUICIDE RISK FACTORS
Mood episode/Highly impulsive behavior/Agitation/severe anxiety Mood episode/Highly impulsive behavior/Unable to engage in safety planning/Access to means (pills, firearms, etc.)/Agitation/severe anxiety

## 2017-09-13 NOTE — ED BEHAVIORAL HEALTH ASSESSMENT NOTE - DETAILS
today, patient was agitated at home and upon police arrival to the point where she had to be handcuffed son with history of mental illness son contacted observed to have healed superficial scrapes on her forehead & a bruise on right upper arm no contact information for son Dr. Kendall

## 2017-09-13 NOTE — ED BEHAVIORAL HEALTH ASSESSMENT NOTE - CURRENT MEDICATION
Prolixin 1mg  Remeron 7.5mg   Benadryl EMS reported patient is prescribed Amlodipine, Lasix, Potassium  They did not obtain dosages

## 2017-09-13 NOTE — ED BEHAVIORAL HEALTH ASSESSMENT NOTE - OTHER PAST PSYCHIATRIC HISTORY (INCLUDE DETAILS REGARDING ONSET, COURSE OF ILLNESS, INPATIENT/OUTPATIENT TREATMENT)
history of schizophrenia, multiple admissions in 1980s, none since then, currently on prolixin, remeron and benadryl, sees psychiatrist and therapist at [facility] history of schizophrenia, multiple admissions in 1980s, none since then.   ED visit to [facility] for psychosis in July 2017 but d/c'd.   No current psychiatric treatment as case was closed due to noncompliance with visits for months.
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
## 2017-09-13 NOTE — ED PROVIDER NOTE - OBJECTIVE STATEMENT
72F h/o schizophrenia presents from home with agitation.  Per EMS, her son called because she was becoming increasingly agitated and physically violent in the setting of medication noncompliance pateint was admitted and sent to [facility] via EMS . on arrival at [facility] patient was HTN and returned to ED for revelation. Patient was agitated on arrival medicated for agitation unable to obtain blood pressure. 72F h/o schizophrenia presents from home with agitation.  Per EMS, her son called because she was becoming increasingly agitated and physically violent in the setting of medication noncompliance Pateint was admitted and sent to [facility] via EMS . on arrival at [facility] patient was Hypertensive and returned to ED for revelation. Patient was agitated on arrival medicated for agitation
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
## 2017-09-13 NOTE — ED BEHAVIORAL HEALTH ASSESSMENT NOTE - PSYCHIATRIC ISSUES AND PLAN (INCLUDE STANDING AND PRN MEDICATION)
continue current medications; prns of haldol 2mg/ativan 1mg/benadryl 50mg po/IM q6hrs prn agitation, psychosis/anxiety/insomnia, eps prophylaxis Restart Prolixin 2.5mg q HS for psychosis & titrate up as needed/tolerated. Restart Remeron 3.75mg q HS for depressive sx (pt took in past when stable). Consider restarting Prolixin Decanoate given success in past. Give Prolixin 2.5mg q 6 hours PRN for psychotic Restart Prolixin 1mg q HS for psychosis & titrate up as needed/tolerated. consider restarting Prolixin Decanoate given success in past. Give Prolixin 1mg q 6 hours PRN for psychotic

## 2017-09-13 NOTE — ED ADULT NURSE NOTE - OBJECTIVE STATEMENT
[facility] and non[facility] to meds, pt presented as agitated, disorganized and malodorous. pt was medicated with IM meds(see EMAR) for stabilization. pt denies SI,HI&AH, denies ETOH and substance use. awaiting psych eval

## 2017-09-13 NOTE — CHART NOTE - NSCHARTNOTEFT_GEN_A_CORE
[facility] Inpatient to ED Transfer Summary    Reason for Transfer/Medical Summary:  72 year-old F with h/o HTN, HLD, Schizophrenia recently sent to [facility] for psychosis in setting of medication noncompliance.  On admission SBP noted to be 202, was given stat dose of Norvasc 5mg x1.  BP rechecked 1 hour later and /100 with HR 88.  Pt denying ROS at this time (HA, N/V, chest pain, palpitations, abd pain).  Advised to have patient to return to ER for hypertensive urgency.       PAST MEDICAL & SURGICAL HISTORY:   Hypertension   Schizophrenia       Allergies    No Known Allergies    Intolerances         MEDICATIONS  (STANDING):   fluPHENAZine 1 milliGRAM(s) Oral at bedtime    MEDICATIONS  (PRN):   fluPHENAZine  Injectable 1 milliGRAM(s) IntraMuscular once PRN agitation   fluPHENAZine 1 milliGRAM(s) Oral every 6 hours PRN agitation         CAPILLARY BLOOD GLUCOSE              PHYSICAL EXAM:   GENERAL: NAD, well-developed   HEAD:  Atraumatic, Normocephalic   EYES: EOMI, PERRLA, conjunctiva and sclera clear   NECK: Supple, No JVD   CHEST/LUNG: Clear to auscultation bilaterally; No wheeze   HEART: Regular rate and rhythm; No murmurs, rubs, or gallops   ABDOMEN: Soft, Nontender, Nondistended; Bowel sounds present   EXTREMITIES:  2+ Peripheral Pulses, No clubbing, cyanosis, or edema   PSYCH: AAOx3   NEUROLOGY: non-focal   SKIN: No rashes or lesions    Vital Signs Last 24 Hrs   T(C): 36.8 (13 Sep 2017 10:49), Max: 36.8 (13 Sep 2017 10:49)   T(F): 98.2 (13 Sep 2017 10:49), Max: 98.2 (13 Sep 2017 10:49)   HR: 60 (13 Sep 2017 10:49) (60 - 90)   BP: 146/69 (13 Sep 2017 10:49) (146/69 - 208/94)   BP(mean): --   RR: 16 (13 Sep 2017 10:49) (16 - 20)   SpO2: 96% (13 Sep 2017 10:49) (96% - 98%)   LABS:                          14.1    9.36  )-----------( 198      ( 13 Sep 2017 10:02 )              42.4     09-13    144  |  104  |  15   ----------------------------<  131<H>   3.3<L>   |  22  |  0.87    Ca    9.5      13 Sep 2017 10:02    TPro  7.3  /  Alb  4.3  /  TBili  1.0  /  DBili  x   /  AST  25  /  ALT  22  /  AlkPhos  99  09-13                 Psychiatry Section:  Pt did not receive any IMs or PRNs while at [facility].  Patient at [facility] for only a few hours.   Psychiatric Summary/[facility] admitting diagnosis:  a 72 year old  female; domiciled with adult son; noncaregiver; PPH of schizophrenia; 3 prior hospitalizations last in 1980s; no known suicide attempts; no known history of violence or arrests; no active substance abuse or known history of complicated withdrawal; PMH of HTN and hypercholesterolemia brought in by son due to agitation.   Patient presented with agitation, brought in handcuffed by police, requiring IM medication. She has vibrant make up and appears dehydrated. Bruises and cuts noted as well. Reports suicidal ideation and non compliance with medications. Appears paranoid. Patient at this time appears decompensated and unable to care for herself. Also appears to be a danger to self (cuts and bruises and active SI), requiring inpatient admission for safety and stabilization. Plan as above.      Psychiatric Recommendations:  Return to [facility] once medically cleared.  Standing meds:  fluphenazine 1mg QHS, Norvasc 5mg daily.  PRNs:  Fluphenazine 1mg PO/IM q6h PRN agitation.    Observation status (check one):    ( X) Constant Observation   ( ) Enhanced care   ( ) Routine checks    Risk Status (check all that apply if present):   ( ) at risk for suicide/self-injury   (X ) at risk for aggressive behavior   ( ) at risk for elopement   ( ) other risk:
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
## 2017-09-13 NOTE — ED BEHAVIORAL HEALTH ASSESSMENT NOTE - MEDICAL ISSUES AND PLAN (INCLUDE STANDING AND PRN MEDICATION)
HTN and HLD Given signs of falls/pt report, patient is at high risk for falls. Fall risk protocols are required for patient. Please obtain pt/ot consult on unit. EM states to restart medical medications at starting doses and obtain medicine consult if necessary. Given signs of falls/pt report, patient is at high risk for falls. Fall risk protocols are required for patient. Please obtain pt/ot consult on unit. EM states to restart Norvasc 5mg q AM for HTN. States to hold K+ and Lasix, f/u on CMP, and obtain medicine consult if needed to evaluate need for potassium/lasix

## 2017-09-13 NOTE — ED PROVIDER NOTE - PROGRESS NOTE DETAILS
vitals obtained WNL patient acutely psychotic medically cleared No signs of respiratory or cardiovascular distress noted.

## 2017-09-13 NOTE — ED PROVIDER NOTE - MEDICAL DECISION MAKING DETAILS
patient cleared vital WNL no other medical concerns or complaints distress noted. patient cleared vital WNL no other medical concerns or complaints no distress noted.

## 2017-09-13 NOTE — ED ADULT NURSE REASSESSMENT NOTE - NS ED NURSE REASSESS COMMENT FT1
pt laying calmly in bed, positive behavioral outcome to administered medication noted, labs done, same resulting, unable to obtain urine sample for testing, pt given h2o and encouraged to drink. Bp on admission (208/94) noted to be high, same resolving (149/69). NAD, will continue to monitor.

## 2017-09-13 NOTE — ED BEHAVIORAL HEALTH ASSESSMENT NOTE - SUICIDE PROTECTIVE FACTORS
Responsibility to family and others/Supportive social network or family/Positive therapeutic relationships/Identifies reasons for living

## 2017-09-13 NOTE — ED PROVIDER NOTE - MEDICAL DECISION MAKING DETAILS
72F with increased agitation, pressured speech.  No evidence of trauma. Initial HTN, resolved without intervention.  Plan for basic labs, psychiatric evaluation.

## 2017-09-13 NOTE — ED BEHAVIORAL HEALTH ASSESSMENT NOTE - CASE SUMMARY
a 72 year old  female; domiciled with adult son; noncaregiver; PPH of schizophrenia; 3 prior hospitalizations last in 1980s; no known suicide attempts; no known history of violence or arrests; no active substance abuse or known history of complicated withdrawal; PMH of HTN and hypercholesterolemia brought in by son due to agitation.     Patient presents with agitation, brought in handcuffed by police, requiring IM medication. She has vibrant make up and appears dehydrated. Bruises and cuts noted as well. Reports suicidal ideation and non compliance with medications. Appears paranoid. Patient at this time appears decompensated and unable to care for herself. Also appears to be a danger to self (cuts and bruises and active SI), requiring inpatient admission for safety and stabilization. Plan as above.

## 2017-09-13 NOTE — ED ADULT NURSE REASSESSMENT NOTE - REASSESS COMMUNICATION
ED physician notified/[facility] LUCIEN Melendez made aware, [facility] staff at bedside for safety.
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
## 2017-09-13 NOTE — ED ADULT NURSE NOTE - CHIEF COMPLAINT QUOTE
BIB ems from [facility] for elevated BP and anxiety. Unable to obtain vitals due to agitation at this time.
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
## 2017-09-13 NOTE — ED PROVIDER NOTE - CARE PLAN
Principal Discharge DX:	Schizophrenia, unspecified type  Secondary Diagnosis:	Deferred condition on axis II
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
## 2017-09-14 DIAGNOSIS — I10 ESSENTIAL (PRIMARY) HYPERTENSION: ICD-10-CM

## 2017-09-14 PROCEDURE — 99222 1ST HOSP IP/OBS MODERATE 55: CPT

## 2017-09-14 RX ORDER — FLUPHENAZINE HYDROCHLORIDE 1 MG/1
2 TABLET, FILM COATED ORAL EVERY 6 HOURS
Qty: 0 | Refills: 0 | Status: DISCONTINUED | OUTPATIENT
Start: 2017-09-14 | End: 2017-09-24

## 2017-09-14 RX ORDER — FLUPHENAZINE HYDROCHLORIDE 1 MG/1
2 TABLET, FILM COATED ORAL ONCE
Qty: 0 | Refills: 0 | Status: DISCONTINUED | OUTPATIENT
Start: 2017-09-14 | End: 2017-09-24

## 2017-09-14 RX ORDER — FLUPHENAZINE HYDROCHLORIDE 1 MG/1
2 TABLET, FILM COATED ORAL
Qty: 0 | Refills: 0 | Status: DISCONTINUED | OUTPATIENT
Start: 2017-09-14 | End: 2017-09-20

## 2017-09-14 RX ORDER — FLUPHENAZINE HYDROCHLORIDE 1 MG/1
1 TABLET, FILM COATED ORAL
Qty: 0 | Refills: 0 | Status: DISCONTINUED | OUTPATIENT
Start: 2017-09-14 | End: 2017-09-14

## 2017-09-14 RX ORDER — AMLODIPINE BESYLATE 2.5 MG/1
5 TABLET ORAL DAILY
Qty: 0 | Refills: 0 | Status: DISCONTINUED | OUTPATIENT
Start: 2017-09-14 | End: 2017-09-20

## 2017-09-14 RX ORDER — FLUPHENAZINE HYDROCHLORIDE 1 MG/1
2 TABLET, FILM COATED ORAL ONCE
Qty: 0 | Refills: 0 | Status: COMPLETED | OUTPATIENT
Start: 2017-09-14 | End: 2017-09-14

## 2017-09-14 RX ORDER — POTASSIUM CHLORIDE 20 MEQ
40 PACKET (EA) ORAL ONCE
Qty: 0 | Refills: 0 | Status: COMPLETED | OUTPATIENT
Start: 2017-09-14 | End: 2017-09-14

## 2017-09-14 RX ADMIN — AMLODIPINE BESYLATE 5 MILLIGRAM(S): 2.5 TABLET ORAL at 11:46

## 2017-09-14 RX ADMIN — FLUPHENAZINE HYDROCHLORIDE 2 MILLIGRAM(S): 1 TABLET, FILM COATED ORAL at 12:20

## 2017-09-15 PROCEDURE — 99232 SBSQ HOSP IP/OBS MODERATE 35: CPT

## 2017-09-16 PROCEDURE — 99232 SBSQ HOSP IP/OBS MODERATE 35: CPT

## 2017-09-17 PROCEDURE — 99232 SBSQ HOSP IP/OBS MODERATE 35: CPT

## 2017-09-18 PROCEDURE — 99232 SBSQ HOSP IP/OBS MODERATE 35: CPT

## 2017-09-18 RX ORDER — FLUPHENAZINE HYDROCHLORIDE 1 MG/1
2 TABLET, FILM COATED ORAL ONCE
Qty: 0 | Refills: 0 | Status: COMPLETED | OUTPATIENT
Start: 2017-09-18 | End: 2017-09-18

## 2017-09-18 RX ADMIN — Medication 1 MILLIGRAM(S): at 09:50

## 2017-09-18 RX ADMIN — FLUPHENAZINE HYDROCHLORIDE 2 MILLIGRAM(S): 1 TABLET, FILM COATED ORAL at 09:50

## 2017-09-18 RX ADMIN — FLUPHENAZINE HYDROCHLORIDE 2 MILLIGRAM(S): 1 TABLET, FILM COATED ORAL at 21:15

## 2017-09-19 PROCEDURE — 99232 SBSQ HOSP IP/OBS MODERATE 35: CPT

## 2017-09-19 RX ADMIN — FLUPHENAZINE HYDROCHLORIDE 2 MILLIGRAM(S): 1 TABLET, FILM COATED ORAL at 21:18

## 2017-09-20 PROCEDURE — 99232 SBSQ HOSP IP/OBS MODERATE 35: CPT

## 2017-09-20 RX ORDER — FLUPHENAZINE HYDROCHLORIDE 1 MG/1
3 TABLET, FILM COATED ORAL
Qty: 0 | Refills: 0 | Status: DISCONTINUED | OUTPATIENT
Start: 2017-09-20 | End: 2017-09-21

## 2017-09-20 RX ORDER — AMLODIPINE BESYLATE 2.5 MG/1
5 TABLET ORAL AT BEDTIME
Qty: 0 | Refills: 0 | Status: DISCONTINUED | OUTPATIENT
Start: 2017-09-20 | End: 2017-10-06

## 2017-09-20 RX ADMIN — AMLODIPINE BESYLATE 5 MILLIGRAM(S): 2.5 TABLET ORAL at 20:28

## 2017-09-20 RX ADMIN — FLUPHENAZINE HYDROCHLORIDE 3 MILLIGRAM(S): 1 TABLET, FILM COATED ORAL at 20:28

## 2017-09-21 ENCOUNTER — EMERGENCY (EMERGENCY)
Facility: HOSPITAL | Age: 72
LOS: 1 days | Discharge: HOME | End: 2017-09-21
Attending: EMERGENCY MEDICINE | Admitting: EMERGENCY MEDICINE
Payer: SELF-PAY

## 2017-09-21 VITALS
DIASTOLIC BLOOD PRESSURE: 44 MMHG | OXYGEN SATURATION: 99 % | TEMPERATURE: 98 F | SYSTOLIC BLOOD PRESSURE: 110 MMHG | HEART RATE: 91 BPM | RESPIRATION RATE: 16 BRPM

## 2017-09-21 VITALS
RESPIRATION RATE: 20 BRPM | SYSTOLIC BLOOD PRESSURE: 147 MMHG | OXYGEN SATURATION: 98 % | DIASTOLIC BLOOD PRESSURE: 117 MMHG | HEART RATE: 99 BPM

## 2017-09-21 DIAGNOSIS — Z90.710 ACQUIRED ABSENCE OF BOTH CERVIX AND UTERUS: Chronic | ICD-10-CM

## 2017-09-21 PROCEDURE — 99232 SBSQ HOSP IP/OBS MODERATE 35: CPT

## 2017-09-21 PROCEDURE — 99284 EMERGENCY DEPT VISIT MOD MDM: CPT | Mod: 25

## 2017-09-21 PROCEDURE — 93010 ELECTROCARDIOGRAM REPORT: CPT

## 2017-09-21 RX ORDER — FLUPHENAZINE HYDROCHLORIDE 1 MG/1
4 TABLET, FILM COATED ORAL
Qty: 0 | Refills: 0 | Status: DISCONTINUED | OUTPATIENT
Start: 2017-09-21 | End: 2017-09-22

## 2017-09-21 RX ADMIN — Medication 2 MILLIGRAM(S): at 22:45

## 2017-09-21 RX ADMIN — FLUPHENAZINE HYDROCHLORIDE 4 MILLIGRAM(S): 1 TABLET, FILM COATED ORAL at 19:47

## 2017-09-21 RX ADMIN — AMLODIPINE BESYLATE 5 MILLIGRAM(S): 2.5 TABLET ORAL at 19:47

## 2017-09-21 NOTE — ED ADULT NURSE NOTE - CHIEF COMPLAINT QUOTE
Pt arrives via EMS from [place] [address] accompanied by [facility].  Per EMS, staff reports that pt was noted to be hypertensive during routine evening vitals. Pt has hx of schizophrenia, not answering any triage questions and is chanting non stop in triage. EMS also reports that staff stated she has been non compliant with her medications for 1 week. Unable to obtain temperature in triage.

## 2017-09-21 NOTE — ED PROVIDER NOTE - PROGRESS NOTE DETAILS
BP is now 183/83.  Spoke with Mack VELAZQUEZ and relayed that we are not concerned about her current BP, and she can take her medication when she is more calm.  She will be transferred back.

## 2017-09-21 NOTE — ED PROVIDER NOTE - ATTENDING CONTRIBUTION TO CARE
I, Jennifer Cabot, MD, have performed a history and physical exam of the patient and discussed their management with the resident. I reviewed the resident's note and agree with the documented findings and plan of care. My medical decision making and observations are found above.    Cabot: 72F with PMH of schizophrenia and HTN on amlodipine but noncompliant with medications, sent from [facility] for HTN to 230/117 and tachycardia to 130s while agitated.  Now calm, with HR in the 70s, BP decreasing and now 190/100.  On exam, awake and alert, word salad, PERRLA, facial movements symmetric, moves all extremities, full strength, no focal deficits.  Patient is medically cleared for return to [facility].

## 2017-09-21 NOTE — ED ADULT NURSE REASSESSMENT NOTE - NS ED NURSE REASSESS COMMENT FT1
Deangeloviekam called from EMS.  [facility] refusing to take pt back 2/2 BP.  Medicated pt as per Dr. Cabot.  As per MD, no need for pt to be admitted OR medicated for BP as pt is asymptomatic.  Will continue to monitor pt.
Pt now leaving ED, en route to [facility] with EMS.
+ effect from Ativan noted.  EMS called, told to take hold off of transport.  Aide at bedside.  Pt awaiting transport back to [facility].

## 2017-09-21 NOTE — CHART NOTE - NSCHARTNOTEFT_GEN_A_CORE
[facility] Inpatient to ED Transfer Summary    Reason for Transfer/Medical Summary: Pt tachycardic to 132 w/ BP of 210/120mmHg. She is an unrealiable historian 2/2 current psychotic state. Hypertensive urgency vs. emergency.      PAST MEDICAL & SURGICAL HISTORY:  Hypertension  Schizophrenia      Allergies    No Known Allergies    Intolerances        MEDICATIONS  (STANDING):  amLODIPine   Tablet 5 milliGRAM(s) Oral at bedtime  fluPHENAZine 4 milliGRAM(s) Oral two times a day    MEDICATIONS  (PRN):  fluPHENAZine 2 milliGRAM(s) Oral every 6 hours PRN AGITATION  fluPHENAZine  Injectable 2 milliGRAM(s) IntraMuscular once PRN AGITATION  LORazepam   Injectable 1 milliGRAM(s) IntraMuscular once PRN Agitation      Vital Signs Last 24 Hrs  Afebrile w/ recent VS as per HPI.            PHYSICAL EXAM:  GENERAL: NAD, well-developed  HEAD:  Atraumatic, Normocephalic  EYES: EOMI, PERRLA, conjunctiva and sclera clear  NECK: Supple  CHEST/LUNG: Clear to auscultation bilaterally; No wheeze  HEART: Tachy w/ regular rhythm; No murmurs, rubs, or gallops  ABDOMEN: Soft, Nontender, Nondistended; Bowel sounds present  EXTREMITIES:  2+ Peripheral Pulses, No clubbing, cyanosis, or edema  PSYCH: acute psychosis  NEUROLOGY: non-focal  SKIN: No rashes or lesions    LABS:                    Psychiatry Section:  Psychiatric Summary/[facility] admitting diagnosis: Schizphrenia    Psychiatric Recommendations: PRNs for agitation as per above    Observation status (check one):   (X) Constant Observation  ( ) Enhanced care  ( ) Routine checks    Risk Status (check all that apply if present):  ( ) at risk for suicide/self-injury  (x) at risk for aggressive behavior  (x) at risk for elopement  ( ) other risk: [facility] Inpatient to ED Transfer Summary    Reason for Transfer/Medical Summary: Pt tachycardic to 132 w/ BP of 210/120mmHg. She is an unrealiable historian 2/2 current psychotic state. Hypertensive urgency vs. emergency.      PAST MEDICAL & SURGICAL HISTORY:  Hypertension  Schizophrenia      Allergies    No Known Allergies    Intolerances        MEDICATIONS  (STANDING):  amLODIPine   Tablet 5 milliGRAM(s) Oral at bedtime  fluPHENAZine 4 milliGRAM(s) Oral two times a day    MEDICATIONS  (PRN):  fluPHENAZine 2 milliGRAM(s) Oral every 6 hours PRN AGITATION  fluPHENAZine  Injectable 2 milliGRAM(s) IntraMuscular once PRN AGITATION  LORazepam   Injectable 1 milliGRAM(s) IntraMuscular once PRN Agitation      Vital Signs Last 24 Hrs  Afebrile w/ recent VS as per HPI.            PHYSICAL EXAM:  GENERAL: NAD, well-developed  HEAD:  Atraumatic, Normocephalic  EYES: EOMI, PERRLA, conjunctiva and sclera clear  NECK: Supple  CHEST/LUNG: Clear to auscultation bilaterally; No wheeze  HEART: Tachy w/ regular rhythm; No murmurs, rubs, or gallops  ABDOMEN: Soft, Nontender, Nondistended  EXTREMITIES: no signs of trauma  PSYCH: acute psychosis  NEUROLOGY: non-focal, limited 2/2 psychosis  SKIN: No rashes or lesions                      Psychiatry Section:  Psychiatric Summary/[facility] admitting diagnosis: Schizphrenia    Psychiatric Recommendations: PRNs for agitation as per above    Observation status (check one):   (X) Constant Observation  ( ) Enhanced care  ( ) Routine checks    Risk Status (check all that apply if present):  ( ) at risk for suicide/self-injury  (x) at risk for aggressive behavior  (x) at risk for elopement  ( ) other risk:

## 2017-09-21 NOTE — ED PROVIDER NOTE - NEUROLOGICAL, MLM
no focal deficits, no motor or sensory deficits. Moving all extremities, cannot cooperate with full neurologic exam.

## 2017-09-21 NOTE — ED ADULT TRIAGE NOTE - CHIEF COMPLAINT QUOTE
Pt arrives via EMS from [place] [address] accompanied by [place] [facility].  Per EMS, staff reports that pt was noted to be hypertensive during routine evening vitals. Pt has hx of schizophrenia, not answering any triage questions and is chanting non stop in triage. EMS also reports that staff stated she has been non compliant with her medications for 1 week. Unable to obtain temperature in triage.

## 2017-09-21 NOTE — ED PROVIDER NOTE - MEDICAL DECISION MAKING DETAILS
Cabot: 72F with PMH of schizophrenia and HTN on amlodipine but noncompliant with medications, sent from [facility] for HTN to 230/117 and tachycardia to 130s while agitated.  Now calm, with HR in the 70s, BP decreasing and now 190/100.  On exam, awake and alert, word salad, PERRLA, facial movements symmetric, moves all extremities, full strength, no focal deficits.  Patient is medically cleared for return to [facility].

## 2017-09-21 NOTE — ED ADULT NURSE NOTE - OBJECTIVE STATEMENT
Received pt in Trauma A, sent from [facility] for evaluation of HTN and non-compliance with HTN medications x1 week.  Pt does not appears to be in any acute distress at present time.  [facility] aide at bedside for 1:1 observation.  Pt awake, rambling in exam room.  Respirations even and unlabored.  No abdominal distention noted.  No edema noted in lower extremities.  Skin noted to be intact.  MD at bedside.  Will continue to monitor.

## 2017-09-21 NOTE — ED PROVIDER NOTE - OBJECTIVE STATEMENT
72F PMH HTN, schizophrenia sent from [facility] where currently being treated for schizophrenia due to HTN and refusal to take her medications. Pt psychotic, but denies any acute complaints. Unable to clarify why she won't take her medications. Denies chest pain, SOB, headache. No focal neurologic deficits noted. [facility] concerned about accepting patient if she is not taking her blood pressure medications.

## 2017-09-21 NOTE — ED ADULT NURSE NOTE - CHPI ED SYMPTOMS NEG
no fever/no numbness/no nausea/no pain/no tingling/no decreased eating/drinking/no vomiting/no chills/no dizziness/no weakness

## 2017-09-22 PROCEDURE — 99223 1ST HOSP IP/OBS HIGH 75: CPT

## 2017-09-22 PROCEDURE — 99232 SBSQ HOSP IP/OBS MODERATE 35: CPT

## 2017-09-22 RX ORDER — FLUPHENAZINE HYDROCHLORIDE 1 MG/1
5 TABLET, FILM COATED ORAL
Qty: 0 | Refills: 0 | Status: DISCONTINUED | OUTPATIENT
Start: 2017-09-22 | End: 2017-09-27

## 2017-09-22 RX ADMIN — Medication 1 MILLIGRAM(S): at 15:10

## 2017-09-22 RX ADMIN — FLUPHENAZINE HYDROCHLORIDE 4 MILLIGRAM(S): 1 TABLET, FILM COATED ORAL at 09:04

## 2017-09-22 RX ADMIN — FLUPHENAZINE HYDROCHLORIDE 5 MILLIGRAM(S): 1 TABLET, FILM COATED ORAL at 20:00

## 2017-09-22 RX ADMIN — AMLODIPINE BESYLATE 5 MILLIGRAM(S): 2.5 TABLET ORAL at 20:00

## 2017-09-22 NOTE — CONSULT NOTE ADULT - ASSESSMENT
72F admitted for psychosis with HTN    Plan:  HTN: Controlled with amlodipine when patient takes meds.  At risk for ASCVD/MI/CVA due to uncontrolled HTN if she refuses meds.  Would benefit from psychiatric stabilization in the hope that she will be better able to understand the risks and benefits of treatment for hypertension, currently lacks capacity to make informed health care choices for hersefl du eto uncontrolled mental illness.  Will benefit from screening for diabetes and hyperlipidemia.    Psychosis: management per primary team.

## 2017-09-22 NOTE — CONSULT NOTE ADULT - SUBJECTIVE AND OBJECTIVE BOX
HPI: Pt is 72F admitted to [facility] 9/13/17 for management of psychosis.      PAST MEDICAL & SURGICAL HISTORY:  Hypertension  Schizophrenia  No significant past surgical history      Review of Systems:   CONSTITUTIONAL: No fever, weight loss, or fatigue  EYES: No eye pain, visual disturbances, or discharge  ENMT:  No difficulty hearing, tinnitus, vertigo; No sinus or throat pain  NECK: No pain or stiffness  RESPIRATORY: No cough, wheezing, chills or hemoptysis; No shortness of breath  CARDIOVASCULAR: No chest pain, palpitations, dizziness, or leg swelling  GASTROINTESTINAL: No abdominal or epigastric pain. No nausea, vomiting, or hematemesis; No diarrhea or constipation. No melena or hematochezia.  GENITOURINARY: No dysuria, frequency, hematuria, or incontinence  NEUROLOGICAL: No headaches, memory loss, loss of strength, numbness, or tremors  SKIN: No itching, burning, rashes, or lesions   LYMPH NODES: No enlarged glands  ENDOCRINE: No heat or cold intolerance; No hair loss  MUSCULOSKELETAL: No joint pain or swelling; No muscle, back, or extremity pain  HEME/LYMPH: No easy bruising, or bleeding gums  ALLERY AND IMMUNOLOGIC: No hives or eczema    Allergies    No Known Allergies    Intolerances        Social History:     FAMILY HISTORY:      MEDICATIONS  (STANDING):  amLODIPine   Tablet 5 milliGRAM(s) Oral at bedtime  fluPHENAZine 4 milliGRAM(s) Oral two times a day    MEDICATIONS  (PRN):  fluPHENAZine 2 milliGRAM(s) Oral every 6 hours PRN AGITATION  fluPHENAZine  Injectable 2 milliGRAM(s) IntraMuscular once PRN AGITATION  LORazepam   Injectable 1 milliGRAM(s) IntraMuscular once PRN Agitation      Vital Signs Last 24 Hrs  T(C): 36.8 (22 Sep 2017 05:58), Max: 37.3 (21 Sep 2017 19:47)  T(F): 98.3 (22 Sep 2017 05:58), Max: 99.2 (21 Sep 2017 19:47)  HR: 91 (21 Sep 2017 23:45) (84 - 99)  BP: 110/44 (21 Sep 2017 23:45) (110/44 - 194/100)  BP(mean): --  RR: 18 (22 Sep 2017 05:58) (14 - 20)  SpO2: 96% (22 Sep 2017 00:20) (96% - 99%)  CAPILLARY BLOOD GLUCOSE            PHYSICAL EXAM:  GENERAL: NAD, well-developed  HEAD:  Atraumatic, Normocephalic  EYES: EOMI, PERRLA, conjunctiva and sclera clear  NECK: Supple, No JVD  CHEST/LUNG: Clear to auscultation bilaterally; No wheeze  HEART: Regular rate and rhythm; No murmurs, rubs, or gallops  ABDOMEN: Soft, Nontender, Nondistended; Bowel sounds present  EXTREMITIES:  2+ Peripheral Pulses, No clubbing, cyanosis, or edema  PSYCH: AAOx3  NEUROLOGY: non-focal  SKIN: No rashes or lesions    LABS:                    EKG(personally reviewed):    RADIOLOGY & ADDITIONAL TESTS:    Imaging Personally Reviewed:    Consultant(s) Notes Reviewed:      Care Discussed with Consultants/Other Providers: HPI: Pt is 72F admitted to [facility] 9/13/17 for management of psychosis.  The day of admission, she was sent to ED for hypertensive urgency with /100. She was treated with amlodipine and returned to [facility]. During her admission, she refused most medications including consistent refusal of amlodipine.  SBPs remained elevated in thw 160s-170s.  On 9/21/17 patient was again sent to ED for hypertensive urgency with /120. She was evaluated and returned to [facility].  She has taken amlodipine for the past two nights. Per staff only one staff member has been able to elicit adherence to medications with this patient.    PAST MEDICAL & SURGICAL HISTORY:  Hypertension  Schizophrenia  No significant past surgical history      Review of Systems: unable to assess due to non-cooperation and psychosis    Allergies    No Known Allergies  Social History: unknown    FAMILY HISTORY: noncontributory      MEDICATIONS  (STANDING):  amLODIPine   Tablet 5 milliGRAM(s) Oral at bedtime  fluPHENAZine 4 milliGRAM(s) Oral two times a day    MEDICATIONS  (PRN):  fluPHENAZine 2 milliGRAM(s) Oral every 6 hours PRN AGITATION  fluPHENAZine  Injectable 2 milliGRAM(s) IntraMuscular once PRN AGITATION  LORazepam   Injectable 1 milliGRAM(s) IntraMuscular once PRN Agitation      Vital Signs Last 24 Hrs  T(C): 36.8 (22 Sep 2017 05:58), Max: 37.3 (21 Sep 2017 19:47)  T(F): 98.3 (22 Sep 2017 05:58), Max: 99.2 (21 Sep 2017 19:47)  HR: 91 (21 Sep 2017 23:45) (84 - 99)  BP: 110/44 (21 Sep 2017 23:45) (110/44 - 194/100)  RR: 18 (22 Sep 2017 05:58) (14 - 20)  SpO2: 96% (22 Sep 2017 00:20) (96% - 99%)    PHYSICAL EXAM:  GENERAL: NAD, well-developed  HEAD:  Atraumatic, Normocephalic  EYES: EOMI, PERRLA, conjunctiva and sclera clear  NECK: Supple, No JVD  CHEST/LUNG: Clear to auscultation bilaterally; No wheeze  HEART: Regular rate and rhythm; No murmurs, rubs, or gallops  ABDOMEN: Soft, Nontender, Nondistended; Bowel sounds present  EXTREMITIES:  2+ Peripheral Pulses, No clubbing, cyanosis, or edema  PSYCH: AAOx3  NEUROLOGY: non-focal  SKIN: No rashes or lesions    LABS:  Thyroid Stimulating Hormone, Serum (09.13.17 @ 10:02)    Thyroid Stimulating Hormone, Serum: 3.37 uIU/mL    Comprehensive Metabolic Panel (09.13.17 @ 10:02)    Sodium, Serum: 144 mmol/L    Potassium, Serum: 3.3 mmol/L    Chloride, Serum: 104 mmol/L    Carbon Dioxide, Serum: 22 mmol/L    Blood Urea Nitrogen, Serum: 15 mg/dL    Creatinine, Serum: 0.87 mg/dL    Glucose, Serum: 131 mg/dL    Calcium, Total Serum: 9.5 mg/dL    Protein Total, Serum: 7.3 g/dL    Albumin, Serum: 4.3 g/dL    Bilirubin Total, Serum: 1.0 mg/dL    Alkaline Phosphatase, Serum: 99u/L    Aspartate Aminotransferase (AST/SGOT): 25 u/L    Alanine Aminotransferase (ALT/SGPT): 22 u/L    eGFR if Non  AmericanmL/min    eGFR if : 76 mL/min  Complete Blood Count + Automated Diff (09.13.17 @ 10:02)    Nucleated RBC #: 0    WBC Count: 9.36 K/uL    RBC Count: 4.59 M/uL    Hemoglobin: 14.1 g/dL    Hematocrit: 42.4 %    Mean Cell Volume: 92.4 fL    Mean Cell Hemoglobin: 30.7 pg    Mean Cell Hemoglobin Conc: 33.3 %    Red Cell Distrib Width: 13.2 %    Platelet Count - Automated: 198 K/uL    MPV: 12.4 fl    Auto Neutrophil #: 7.58 K/uL    Auto Lymphocyte #: 1.31 K/uL    Auto Monocyte #: 0.43 K/uL    Auto Eosinophil #: 0.01 K/uL    Auto Basophil #: 0.01 K/uL    Auto Immature Granulocyte #: 0.02: (Includes meta, myelo and promyelocytes) #    Auto Neutrophil %: 81.0 %    Auto Lymphocyte %: 14.0 %    Auto Monocyte %: 4.6 %    Auto Eosinophil %: 0.1 %    Auto Basophil %: 0.1 %    Auto Immature Granulocyte %: 0.2: (Includes meta, myelo and promyelocytes) %            EKG(personally reviewed):< from: 12 Lead ECG (09.13.17 @ 13:20) >    Ventricular Rate 74 BPM    Atrial Rate 74 BPM    P-R Interval 88 ms    QRS Duration 90 ms    Q-T Interval 564 ms    QTC Calculation(Bezet) 626 ms    R Axis 58 degrees    T Axis -87 degrees    Diagnosis Line Sinus rhythm with short OK  Left ventricular hypertrophy with repolarization abnormality  Prolonged QT  Abnormal ECG    < end of copied text >      Care Discussed with Consultants/Other Providers: Dr Kendall HPI: Pt is 72F admitted to [facility] 9/13/17 for management of psychosis.  The day of admission, she was sent to ED for hypertensive urgency with /100. She was treated with amlodipine and returned to [facility]. During her admission, she refused most medications including consistent refusal of amlodipine.  SBPs remained elevated in thw 160s-170s.  On 9/21/17 patient was again sent to ED for hypertensive urgency with /120. She was evaluated, given  and returned to [facility].  She has taken amlodipine for the past two nights. Per staff only one staff member has been able to elicit adherence to medications with this patient.    PAST MEDICAL & SURGICAL HISTORY:  Hypertension  Schizophrenia  No significant past surgical history      Review of Systems: unable to assess due to non-cooperation and psychosis    Allergies    No Known Allergies  Social History: unknown    FAMILY HISTORY: noncontributory      MEDICATIONS  (STANDING):  amLODIPine   Tablet 5 milliGRAM(s) Oral at bedtime  fluPHENAZine 4 milliGRAM(s) Oral two times a day    MEDICATIONS  (PRN):  fluPHENAZine 2 milliGRAM(s) Oral every 6 hours PRN AGITATION  fluPHENAZine  Injectable 2 milliGRAM(s) IntraMuscular once PRN AGITATION  LORazepam   Injectable 1 milliGRAM(s) IntraMuscular once PRN Agitation      Vital Signs Last 24 Hrs  T(C): 36.8 (22 Sep 2017 05:58), Max: 37.3 (21 Sep 2017 19:47)  T(F): 98.3 (22 Sep 2017 05:58), Max: 99.2 (21 Sep 2017 19:47)  HR: 91 (21 Sep 2017 23:45) (84 - 99)  BP: 110/44 (21 Sep 2017 23:45) (110/44 - 194/100)  RR: 18 (22 Sep 2017 05:58) (14 - 20)  SpO2: 96% (22 Sep 2017 00:20) (96% - 99%)    PHYSICAL EXAM:  GENERAL: NAD, well-developed  HEAD:  Atraumatic, Normocephalic  EYES: EOMI, PERRLA, conjunctiva and sclera clear  NECK: Supple, No JVD  CHEST/LUNG: Clear to auscultation bilaterally; No wheeze  HEART: Regular rate and rhythm; No murmurs, rubs, or gallops  ABDOMEN: Soft, Nontender, Nondistended; Bowel sounds present  EXTREMITIES:  2+ Peripheral Pulses, No clubbing, cyanosis, or edema  PSYCH: AAOx3  NEUROLOGY: non-focal  SKIN: No rashes or lesions    LABS:  Thyroid Stimulating Hormone, Serum (09.13.17 @ 10:02)    Thyroid Stimulating Hormone, Serum: 3.37 uIU/mL    Comprehensive Metabolic Panel (09.13.17 @ 10:02)    Sodium, Serum: 144 mmol/L    Potassium, Serum: 3.3 mmol/L    Chloride, Serum: 104 mmol/L    Carbon Dioxide, Serum: 22 mmol/L    Blood Urea Nitrogen, Serum: 15 mg/dL    Creatinine, Serum: 0.87 mg/dL    Glucose, Serum: 131 mg/dL    Calcium, Total Serum: 9.5 mg/dL    Protein Total, Serum: 7.3 g/dL    Albumin, Serum: 4.3 g/dL    Bilirubin Total, Serum: 1.0 mg/dL    Alkaline Phosphatase, Serum: 99u/L    Aspartate Aminotransferase (AST/SGOT): 25 u/L    Alanine Aminotransferase (ALT/SGPT): 22 u/L    eGFR if Non  AmericanmL/min    eGFR if : 76 mL/min  Complete Blood Count + Automated Diff (09.13.17 @ 10:02)    Nucleated RBC #: 0    WBC Count: 9.36 K/uL    RBC Count: 4.59 M/uL    Hemoglobin: 14.1 g/dL    Hematocrit: 42.4 %    Mean Cell Volume: 92.4 fL    Mean Cell Hemoglobin: 30.7 pg    Mean Cell Hemoglobin Conc: 33.3 %    Red Cell Distrib Width: 13.2 %    Platelet Count - Automated: 198 K/uL    MPV: 12.4 fl    Auto Neutrophil #: 7.58 K/uL    Auto Lymphocyte #: 1.31 K/uL    Auto Monocyte #: 0.43 K/uL    Auto Eosinophil #: 0.01 K/uL    Auto Basophil #: 0.01 K/uL    Auto Immature Granulocyte #: 0.02: (Includes meta, myelo and promyelocytes) #    Auto Neutrophil %: 81.0 %    Auto Lymphocyte %: 14.0 %    Auto Monocyte %: 4.6 %    Auto Eosinophil %: 0.1 %    Auto Basophil %: 0.1 %    Auto Immature Granulocyte %: 0.2: (Includes meta, myelo and promyelocytes) %            EKG(personally reviewed):< from: 12 Lead ECG (09.13.17 @ 13:20) >    Ventricular Rate 74 BPM    Atrial Rate 74 BPM    P-R Interval 88 ms    QRS Duration 90 ms    Q-T Interval 564 ms    QTC Calculation(Bezet) 626 ms    R Axis 58 degrees    T Axis -87 degrees    Diagnosis Line Sinus rhythm with short MA  Left ventricular hypertrophy with repolarization abnormality  Prolonged QT  Abnormal ECG    < end of copied text >      Care Discussed with Consultants/Other Providers: Dr Kendall HPI: Pt is 72F admitted to [facility] 9/13/17 for management of psychosis.  The day of admission, she was sent to ED for hypertensive urgency with /100. She was treated with amlodipine and returned to [facility]. During her admission, she refused most medications including consistent refusal of amlodipine.  SBPs remained elevated in thw 160s-170s.  On 9/21/17 patient was again sent to ED for hypertensive urgency with /120. She was evaluated, given ativan IM, BP imrpoved and returned to [facility].  She has taken amlodipine for the past two nights. Per staff only one staff member has been able to elicit adherence to medications with this patient.  Today patient is disorganized and irritable, noncompliant with medical interview or exam.    PAST MEDICAL & SURGICAL HISTORY:  Hypertension  Schizophrenia  No significant past surgical history      Review of Systems: unable to assess due to non-cooperation and psychosis    Allergies    No Known Allergies  Social History: unknown    FAMILY HISTORY: noncontributory      MEDICATIONS  (STANDING):  amLODIPine   Tablet 5 milliGRAM(s) Oral at bedtime  fluPHENAZine 4 milliGRAM(s) Oral two times a day    MEDICATIONS  (PRN):  fluPHENAZine 2 milliGRAM(s) Oral every 6 hours PRN AGITATION  fluPHENAZine  Injectable 2 milliGRAM(s) IntraMuscular once PRN AGITATION  LORazepam   Injectable 1 milliGRAM(s) IntraMuscular once PRN Agitation      Vital Signs Last 24 Hrs  T(C): 36.8 (22 Sep 2017 05:58), Max: 37.3 (21 Sep 2017 19:47)  T(F): 98.3 (22 Sep 2017 05:58), Max: 99.2 (21 Sep 2017 19:47)  HR: 91 (21 Sep 2017 23:45) (84 - 99)  BP: 110/44 (21 Sep 2017 23:45) (110/44 - 194/100)  RR: 18 (22 Sep 2017 05:58) (14 - 20)  SpO2: 96% (22 Sep 2017 00:20) (96% - 99%)    PHYSICAL EXAM:  GENERAL: NAD, well-developed  HEAD:  Atraumatic, Normocephalic  EYES: EOMI, PERRLA, conjunctiva and sclera clear  NECK: Supple, No JVD  CHEST/LUNG: Clear to auscultation bilaterally; No wheeze  HEART: Regular rate and rhythm; No murmurs, rubs, or gallops  ABDOMEN: Soft, Nontender, Nondistended; Bowel sounds present  EXTREMITIES:  2+ Peripheral Pulses, No clubbing, cyanosis, or edema  PSYCH: AAOx3  NEUROLOGY: non-focal  SKIN: No rashes or lesions    LABS:  Thyroid Stimulating Hormone, Serum (09.13.17 @ 10:02)    Thyroid Stimulating Hormone, Serum: 3.37 uIU/mL    Comprehensive Metabolic Panel (09.13.17 @ 10:02)    Sodium, Serum: 144 mmol/L    Potassium, Serum: 3.3 mmol/L    Chloride, Serum: 104 mmol/L    Carbon Dioxide, Serum: 22 mmol/L    Blood Urea Nitrogen, Serum: 15 mg/dL    Creatinine, Serum: 0.87 mg/dL    Glucose, Serum: 131 mg/dL    Calcium, Total Serum: 9.5 mg/dL    Protein Total, Serum: 7.3 g/dL    Albumin, Serum: 4.3 g/dL    Bilirubin Total, Serum: 1.0 mg/dL    Alkaline Phosphatase, Serum: 99u/L    Aspartate Aminotransferase (AST/SGOT): 25 u/L    Alanine Aminotransferase (ALT/SGPT): 22 u/L    eGFR if Non  AmericanmL/min    eGFR if : 76 mL/min  Complete Blood Count + Automated Diff (09.13.17 @ 10:02)    Nucleated RBC #: 0    WBC Count: 9.36 K/uL    RBC Count: 4.59 M/uL    Hemoglobin: 14.1 g/dL    Hematocrit: 42.4 %    Mean Cell Volume: 92.4 fL    Mean Cell Hemoglobin: 30.7 pg    Mean Cell Hemoglobin Conc: 33.3 %    Red Cell Distrib Width: 13.2 %    Platelet Count - Automated: 198 K/uL    MPV: 12.4 fl    Auto Neutrophil #: 7.58 K/uL    Auto Lymphocyte #: 1.31 K/uL    Auto Monocyte #: 0.43 K/uL    Auto Eosinophil #: 0.01 K/uL    Auto Basophil #: 0.01 K/uL    Auto Immature Granulocyte #: 0.02: (Includes meta, myelo and promyelocytes) #    Auto Neutrophil %: 81.0 %    Auto Lymphocyte %: 14.0 %    Auto Monocyte %: 4.6 %    Auto Eosinophil %: 0.1 %    Auto Basophil %: 0.1 %    Auto Immature Granulocyte %: 0.2: (Includes meta, myelo and promyelocytes) %            EKG(personally reviewed):< from: 12 Lead ECG (09.13.17 @ 13:20) >    Ventricular Rate 74 BPM    Atrial Rate 74 BPM    P-R Interval 88 ms    QRS Duration 90 ms    Q-T Interval 564 ms    QTC Calculation(Bezet) 626 ms    R Axis 58 degrees    T Axis -87 degrees    Diagnosis Line Sinus rhythm with short UT  Left ventricular hypertrophy with repolarization abnormality  Prolonged QT  Abnormal ECG    < end of copied text >      Care Discussed with Consultants/Other Providers: Dr Kendall

## 2017-09-23 PROCEDURE — 99232 SBSQ HOSP IP/OBS MODERATE 35: CPT

## 2017-09-23 RX ORDER — FLUPHENAZINE HYDROCHLORIDE 1 MG/1
2 TABLET, FILM COATED ORAL ONCE
Qty: 0 | Refills: 0 | Status: COMPLETED | OUTPATIENT
Start: 2017-09-23 | End: 2017-09-23

## 2017-09-23 RX ADMIN — FLUPHENAZINE HYDROCHLORIDE 2 MILLIGRAM(S): 1 TABLET, FILM COATED ORAL at 20:44

## 2017-09-23 RX ADMIN — FLUPHENAZINE HYDROCHLORIDE 5 MILLIGRAM(S): 1 TABLET, FILM COATED ORAL at 22:20

## 2017-09-23 RX ADMIN — Medication 1 MILLIGRAM(S): at 20:44

## 2017-09-23 RX ADMIN — AMLODIPINE BESYLATE 5 MILLIGRAM(S): 2.5 TABLET ORAL at 22:20

## 2017-09-24 PROCEDURE — 99232 SBSQ HOSP IP/OBS MODERATE 35: CPT

## 2017-09-24 RX ORDER — FLUPHENAZINE HYDROCHLORIDE 1 MG/1
5 TABLET, FILM COATED ORAL ONCE
Qty: 0 | Refills: 0 | Status: DISCONTINUED | OUTPATIENT
Start: 2017-09-24 | End: 2017-10-07

## 2017-09-24 RX ORDER — FLUPHENAZINE HYDROCHLORIDE 1 MG/1
2.5 TABLET, FILM COATED ORAL EVERY 6 HOURS
Qty: 0 | Refills: 0 | Status: DISCONTINUED | OUTPATIENT
Start: 2017-09-24 | End: 2017-10-03

## 2017-09-24 RX ORDER — FLUPHENAZINE HYDROCHLORIDE 1 MG/1
5 TABLET, FILM COATED ORAL EVERY 6 HOURS
Qty: 0 | Refills: 0 | Status: DISCONTINUED | OUTPATIENT
Start: 2017-09-24 | End: 2017-09-24

## 2017-09-24 RX ADMIN — FLUPHENAZINE HYDROCHLORIDE 5 MILLIGRAM(S): 1 TABLET, FILM COATED ORAL at 21:11

## 2017-09-24 RX ADMIN — AMLODIPINE BESYLATE 5 MILLIGRAM(S): 2.5 TABLET ORAL at 21:11

## 2017-09-25 PROCEDURE — 99232 SBSQ HOSP IP/OBS MODERATE 35: CPT

## 2017-09-25 RX ADMIN — FLUPHENAZINE HYDROCHLORIDE 5 MILLIGRAM(S): 1 TABLET, FILM COATED ORAL at 21:26

## 2017-09-25 RX ADMIN — FLUPHENAZINE HYDROCHLORIDE 5 MILLIGRAM(S): 1 TABLET, FILM COATED ORAL at 08:06

## 2017-09-25 RX ADMIN — AMLODIPINE BESYLATE 5 MILLIGRAM(S): 2.5 TABLET ORAL at 21:26

## 2017-09-26 PROCEDURE — 99232 SBSQ HOSP IP/OBS MODERATE 35: CPT

## 2017-09-26 RX ORDER — FLUPHENAZINE HYDROCHLORIDE 1 MG/1
5 TABLET, FILM COATED ORAL ONCE
Qty: 0 | Refills: 0 | Status: COMPLETED | OUTPATIENT
Start: 2017-09-26 | End: 2017-09-26

## 2017-09-26 RX ORDER — HALOPERIDOL DECANOATE 100 MG/ML
0.25 INJECTION INTRAMUSCULAR
Qty: 0 | Refills: 0 | Status: DISCONTINUED | OUTPATIENT
Start: 2017-09-26 | End: 2017-09-26

## 2017-09-26 RX ADMIN — FLUPHENAZINE HYDROCHLORIDE 5 MILLIGRAM(S): 1 TABLET, FILM COATED ORAL at 21:16

## 2017-09-26 RX ADMIN — FLUPHENAZINE HYDROCHLORIDE 5 MILLIGRAM(S): 1 TABLET, FILM COATED ORAL at 11:05

## 2017-09-26 RX ADMIN — AMLODIPINE BESYLATE 5 MILLIGRAM(S): 2.5 TABLET ORAL at 21:16

## 2017-09-26 RX ADMIN — Medication 1 MILLIGRAM(S): at 21:16

## 2017-09-27 PROCEDURE — 99232 SBSQ HOSP IP/OBS MODERATE 35: CPT

## 2017-09-27 RX ORDER — FLUPHENAZINE HYDROCHLORIDE 1 MG/1
10 TABLET, FILM COATED ORAL
Qty: 0 | Refills: 0 | Status: DISCONTINUED | OUTPATIENT
Start: 2017-09-27 | End: 2017-09-29

## 2017-09-27 RX ORDER — FLUPHENAZINE HYDROCHLORIDE 1 MG/1
5 TABLET, FILM COATED ORAL DAILY
Qty: 0 | Refills: 0 | Status: DISCONTINUED | OUTPATIENT
Start: 2017-09-27 | End: 2017-10-03

## 2017-09-27 RX ADMIN — Medication 1 MILLIGRAM(S): at 21:06

## 2017-09-27 RX ADMIN — FLUPHENAZINE HYDROCHLORIDE 10 MILLIGRAM(S): 1 TABLET, FILM COATED ORAL at 21:06

## 2017-09-27 RX ADMIN — AMLODIPINE BESYLATE 5 MILLIGRAM(S): 2.5 TABLET ORAL at 21:06

## 2017-09-28 PROCEDURE — 99232 SBSQ HOSP IP/OBS MODERATE 35: CPT

## 2017-09-28 RX ORDER — FLUPHENAZINE HYDROCHLORIDE 1 MG/1
5 TABLET, FILM COATED ORAL ONCE
Qty: 0 | Refills: 0 | Status: COMPLETED | OUTPATIENT
Start: 2017-09-28 | End: 2017-09-28

## 2017-09-28 RX ADMIN — FLUPHENAZINE HYDROCHLORIDE 5 MILLIGRAM(S): 1 TABLET, FILM COATED ORAL at 13:00

## 2017-09-28 RX ADMIN — Medication 1 MILLIGRAM(S): at 22:00

## 2017-09-28 RX ADMIN — AMLODIPINE BESYLATE 5 MILLIGRAM(S): 2.5 TABLET ORAL at 22:00

## 2017-09-28 RX ADMIN — FLUPHENAZINE HYDROCHLORIDE 10 MILLIGRAM(S): 1 TABLET, FILM COATED ORAL at 22:00

## 2017-09-28 RX ADMIN — FLUPHENAZINE HYDROCHLORIDE 5 MILLIGRAM(S): 1 TABLET, FILM COATED ORAL at 12:44

## 2017-09-29 PROCEDURE — 99232 SBSQ HOSP IP/OBS MODERATE 35: CPT

## 2017-09-29 RX ORDER — FLUPHENAZINE HYDROCHLORIDE 1 MG/1
15 TABLET, FILM COATED ORAL
Qty: 0 | Refills: 0 | Status: DISCONTINUED | OUTPATIENT
Start: 2017-09-29 | End: 2017-10-03

## 2017-09-29 RX ADMIN — FLUPHENAZINE HYDROCHLORIDE 15 MILLIGRAM(S): 1 TABLET, FILM COATED ORAL at 21:32

## 2017-09-29 RX ADMIN — Medication 1 MILLIGRAM(S): at 21:32

## 2017-09-29 RX ADMIN — FLUPHENAZINE HYDROCHLORIDE 5 MILLIGRAM(S): 1 TABLET, FILM COATED ORAL at 09:40

## 2017-09-29 RX ADMIN — AMLODIPINE BESYLATE 5 MILLIGRAM(S): 2.5 TABLET ORAL at 21:32

## 2017-09-30 PROCEDURE — 99232 SBSQ HOSP IP/OBS MODERATE 35: CPT

## 2017-09-30 RX ADMIN — FLUPHENAZINE HYDROCHLORIDE 5 MILLIGRAM(S): 1 TABLET, FILM COATED ORAL at 09:33

## 2017-09-30 RX ADMIN — FLUPHENAZINE HYDROCHLORIDE 15 MILLIGRAM(S): 1 TABLET, FILM COATED ORAL at 21:58

## 2017-09-30 RX ADMIN — Medication 1 MILLIGRAM(S): at 21:58

## 2017-09-30 RX ADMIN — AMLODIPINE BESYLATE 5 MILLIGRAM(S): 2.5 TABLET ORAL at 21:58

## 2017-10-01 PROCEDURE — 99232 SBSQ HOSP IP/OBS MODERATE 35: CPT

## 2017-10-01 RX ADMIN — Medication 1 MILLIGRAM(S): at 20:59

## 2017-10-01 RX ADMIN — FLUPHENAZINE HYDROCHLORIDE 5 MILLIGRAM(S): 1 TABLET, FILM COATED ORAL at 09:00

## 2017-10-01 RX ADMIN — AMLODIPINE BESYLATE 5 MILLIGRAM(S): 2.5 TABLET ORAL at 20:59

## 2017-10-01 RX ADMIN — FLUPHENAZINE HYDROCHLORIDE 15 MILLIGRAM(S): 1 TABLET, FILM COATED ORAL at 20:59

## 2017-10-02 PROCEDURE — 99233 SBSQ HOSP IP/OBS HIGH 50: CPT

## 2017-10-02 PROCEDURE — 99232 SBSQ HOSP IP/OBS MODERATE 35: CPT

## 2017-10-02 RX ADMIN — Medication 1 MILLIGRAM(S): at 21:04

## 2017-10-02 RX ADMIN — AMLODIPINE BESYLATE 5 MILLIGRAM(S): 2.5 TABLET ORAL at 21:04

## 2017-10-02 RX ADMIN — FLUPHENAZINE HYDROCHLORIDE 15 MILLIGRAM(S): 1 TABLET, FILM COATED ORAL at 21:04

## 2017-10-02 NOTE — CONSULT NOTE ADULT - SUBJECTIVE AND OBJECTIVE BOX
HPI:  72 year old female with psych disorder now with exacerbation refusing psychiatric and medical medicines going to court for meds over objeciton.    Pt with labile elevated HTN when pt not compliant with Amlodipine 5 mg qd.  On day of admission 2017 pt with a bp of 220/100 and was sent to ED.  Pt treated and returned.  Pt continued to refuse blood pressure medicine Amlodipine with BP systolic 160-170.  On 17 pt again sent to ED with hypertensive emergency with blood pressure of 213/120.  Pt treated and returned to [facility].  Pt takes Amlodipine intermittently.    PAST MEDICAL & SURGICAL HISTORY:  Hypertension  Schizophrenia  No significant past surgical history      Allergies    No Known Allergies        Social History:  -cigs, -etoh, -drugs.    FAMILY HISTORY:  mother , father .      MEDICATIONS  (STANDING):  amLODIPine   Tablet 5 milliGRAM(s) Oral at bedtime  LORazepam     Tablet 1 milliGRAM(s) Oral <User Schedule>  fluPHENAZine 5 milliGRAM(s) Oral daily  fluPHENAZine 15 milliGRAM(s) Oral <User Schedule>    MEDICATIONS  (PRN):  fluPHENAZine  Injectable 5 milliGRAM(s) IntraMuscular once PRN AGITATION  fluPHENAZine 2.5 milliGRAM(s) Oral every 6 hours PRN AGITATION        PHYSICAL EXAM:  GENERAL: NAD, well-developed  HEAD:  Atraumatic, Normocephalic  EYES: EOMI, PERRLA, conjunctiva and sclera clear  NECK: Supple, No JVD  CHEST/LUNG: Clear to auscultation bilaterally; No wheeze  HEART: Regular rate and rhythm; No murmurs, rubs, or gallops  ABDOMEN: Soft, Nontender, Nondistended; Bowel sounds present  EXTREMITIES:  2+ Peripheral Pulses, No clubbing, cyanosis, or edema  PSYCH: AAOx3  NEUROLOGY: non-focal  SKIN: No rashes or lesions    LABS:      EK2017  NSR ARTIFACT QT .40 QTC .42 NONSPECIFIC ST T WAVE CHANGES.          2017   NSR BASELINE ARTIFACT QT .344 QTC .441. NONSPECIFIC ST T WAVE CHANGES.      Consultant(s) Notes Reviewed:  HOSPITALIST NOTES    Care Discussed with Consultants/Other Providers: ATTENDING STAFF AND LEGAL TEAM. HPI:  72 year old female with psych disorder now with exacerbation refusing psychiatric and medical medicines going to court for meds over objeciton.    Pt with labile elevated HTN when pt not compliant with Amlodipine 5 mg qd.  On day of admission 2017 pt with a bp of 220/100 and was sent to ED.  Pt treated and returned.  Pt continued to refuse blood pressure medicine Amlodipine with BP systolic 160-170.  On 17 pt again sent to ED with hypertensive emergency with blood pressure of 213/120.  Pt treated and returned to [facility].  Pt takes Amlodipine intermittently.    PAST MEDICAL & SURGICAL HISTORY:  Hypertension  Schizophrenia  No significant past surgical history      Allergies    No Known Allergies        Social History:  -cigs, -etoh, -drugs.    FAMILY HISTORY:  mother , father .      MEDICATIONS  (STANDING):  amLODIPine   Tablet 5 milliGRAM(s) Oral at bedtime  LORazepam     Tablet 1 milliGRAM(s) Oral <User Schedule>  fluPHENAZine 5 milliGRAM(s) Oral daily  fluPHENAZine 15 milliGRAM(s) Oral <User Schedule>    MEDICATIONS  (PRN):  fluPHENAZine  Injectable 5 milliGRAM(s) IntraMuscular once PRN AGITATION  fluPHENAZine 2.5 milliGRAM(s) Oral every 6 hours PRN AGITATION        PHYSICAL EXAM:  GENERAL: NAD, well-developed  HEAD:  Atraumatic, Normocephalic  EYES: EOMI, PERRLA, conjunctiva and sclera clear  NECK: Supple, No JVD  CHEST/LUNG: Clear to auscultation bilaterally; No wheeze  HEART: Regular rate and rhythm; No murmurs, rubs, or gallops  ABDOMEN: Soft, Nontender, Nondistended; Bowel sounds present  EXTREMITIES:  2+ Peripheral Pulses, No clubbing, cyanosis, or edema  PSYCH: AAOx3  NEUROLOGY: non-focal  SKIN: No rashes or lesions    LABS:  2017  WBC 9.36 H/H 14.1/42.4 PLAT 198   K 3.3  CO2 22 GLUC 131 BUN 15 CR 0.87  CA 9.5 PROT 7.3 AOB 4.3 ALK PHOS 99 SGOT 25 SGPT 22 TSH 3.37      EK2017  NSR ARTIFACT QT .40 QTC .42 NONSPECIFIC ST T WAVE CHANGES.          2017   NSR BASELINE ARTIFACT QT .344 QTC .441. NONSPECIFIC ST T WAVE CHANGES.      Consultant(s) Notes Reviewed:  HOSPITALIST NOTES    Care Discussed with Consultants/Other Providers: ATTENDING STAFF AND LEGAL TEAM. HPI:  72 year old female with psych disorder now with exacerbation refusing psychiatric and medical medicines going to court for meds over objeciton.    PT HIGHLY AGITATED, SCREAMING TO GET OUT OF HER ROOM.    Pt with labile elevated HTN when pt not compliant with Amlodipine 5 mg qd.  On day of admission 2017 pt with a bp of 220/100 and was sent to ED.  Pt treated and returned.  Pt continued to refuse blood pressure medicine Amlodipine with BP systolic 160-170.  On 17 pt again sent to ED with hypertensive emergency with blood pressure of 213/120.  Pt treated and returned to [facility].  Pt takes Amlodipine intermittently.    PAST MEDICAL & SURGICAL HISTORY:  Hypertension  Schizophrenia  No significant past surgical history      Allergies    No Known Allergies        Social History:  -cigs, -etoh, -drugs.    FAMILY HISTORY:  mother , father .      MEDICATIONS  (STANDING):  amLODIPine   Tablet 5 milliGRAM(s) Oral at bedtime  LORazepam     Tablet 1 milliGRAM(s) Oral <User Schedule>  fluPHENAZine 5 milliGRAM(s) Oral daily  fluPHENAZine 15 milliGRAM(s) Oral <User Schedule>    MEDICATIONS  (PRN):  fluPHENAZine  Injectable 5 milliGRAM(s) IntraMuscular once PRN AGITATION  fluPHENAZine 2.5 milliGRAM(s) Oral every 6 hours PRN AGITATION        PHYSICAL EXAM:  HIGHLY AGITATED IRRITATED CHASING NURSE AND MYSELF OUT OF ROOM.  SKIN INTACT, HEAD NC/AT, EYES EOMI. EXT -C-C-E.  PT REFUSED PHYSICAL EXAM.    LABS:  2017  WBC 9.36 H/H 14.1/42.4 PLAT 198   K 3.3  CO2 22 GLUC 131 BUN 15 CR 0.87  CA 9.5 PROT 7.3 AOB 4.3 ALK PHOS 99 SGOT 25 SGPT 22 TSH 3.37      EK2017  NSR ARTIFACT QT .40 QTC .42 NONSPECIFIC ST T WAVE CHANGES.          2017   NSR BASELINE ARTIFACT QT .344 QTC .441. NONSPECIFIC ST T WAVE CHANGES.      Consultant(s) Notes Reviewed:  HOSPITALIST NOTES    Care Discussed with Consultants/Other Providers: ATTENDING STAFF AND LEGAL TEAM.

## 2017-10-02 NOTE — CONSULT NOTE ADULT - CONSULT REASON
72 year old female with psych disorder now with exacerbation refusing psychiatric and medical medicines going to court for meds over objection.

## 2017-10-02 NOTE — CONSULT NOTE ADULT - ASSESSMENT
72 year old female with psych disorder now with exacerbation refusing both psychiatric and medical medicines going to court for meds over objection.  1.  HTN:  PT INTERMITTENTLY COMPLIANT WITH AMLODIPINE 5 MG QD.  PT OFTEN REFUSES WHICH RESULTS IN HYPERTENSIVE CRISES AND PT HAS BEEN TRANSFERRED TO ED TWICE FOR TREATMENT.  PT AT RISK FOR MI AND CVA WITH UNTREATED HTN.  2.  HYPOKALEMIA:  K 3.3 ON ADMISSION LOW.  PT REFUSES REPEAT BLOOD WORK.  3.  PSYCH:  AGREE WITH GOING TO COURT FOR MEDICATIONS OVER OBJECTION.  TREATING PT PSYCHIATRICALLY WILL ALLOW PT TO BETTER UNDERSTAND THE SERIOUS NATURE OF HER MEDICAL ISSUES LEADING TO GREATER COMPLIANCE AND IMPROVED MEDICAL OUTCOMES. 72 year old female with psych disorder now with exacerbation refusing both psychiatric and medical medicines going to court for meds over objection.  1.  HTN:  PT INTERMITTENTLY COMPLIANT WITH AMLODIPINE 5 MG QD.  PT OFTEN REFUSES WHICH RESULTS IN HYPERTENSIVE CRISES AND PT HAS BEEN TRANSFERRED TO ED TWICE FOR TREATMENT.  PT AT RISK FOR MI AND CVA WITH UNTREATED HTN.  BP NOW BETTER CONTROLLED SINCE PT HAS TAKEN AMLODIPINE PAST 7 DAYS HS.  2.  HYPOKALEMIA:  K 3.3 ON ADMISSION LOW.  PT REFUSES REPEAT BLOOD WORK.  3.  PSYCH:  AGREE WITH GOING TO COURT FOR MEDICATIONS OVER OBJECTION.  TREATING PT PSYCHIATRICALLY WILL ALLOW PT TO BETTER UNDERSTAND THE SERIOUS NATURE OF HER MEDICAL ISSUES LEADING TO GREATER COMPLIANCE AND IMPROVED MEDICAL OUTCOMES.

## 2017-10-03 PROCEDURE — 99232 SBSQ HOSP IP/OBS MODERATE 35: CPT

## 2017-10-03 RX ORDER — FLUPHENAZINE HYDROCHLORIDE 1 MG/1
15 TABLET, FILM COATED ORAL
Qty: 0 | Refills: 0 | Status: DISCONTINUED | OUTPATIENT
Start: 2017-10-03 | End: 2017-10-05

## 2017-10-03 RX ORDER — NYSTATIN CREAM 100000 [USP'U]/G
1 CREAM TOPICAL
Qty: 0 | Refills: 0 | Status: DISCONTINUED | OUTPATIENT
Start: 2017-10-03 | End: 2017-11-22

## 2017-10-03 RX ORDER — FLUPHENAZINE HYDROCHLORIDE 1 MG/1
5 TABLET, FILM COATED ORAL DAILY
Qty: 0 | Refills: 0 | Status: DISCONTINUED | OUTPATIENT
Start: 2017-10-03 | End: 2017-10-05

## 2017-10-03 RX ORDER — FLUPHENAZINE HYDROCHLORIDE 1 MG/1
5 TABLET, FILM COATED ORAL EVERY 6 HOURS
Qty: 0 | Refills: 0 | Status: DISCONTINUED | OUTPATIENT
Start: 2017-10-03 | End: 2017-10-07

## 2017-10-03 RX ORDER — FLUPHENAZINE HYDROCHLORIDE 1 MG/1
5 TABLET, FILM COATED ORAL ONCE
Qty: 0 | Refills: 0 | Status: DISCONTINUED | OUTPATIENT
Start: 2017-10-03 | End: 2017-10-08

## 2017-10-03 RX ADMIN — AMLODIPINE BESYLATE 5 MILLIGRAM(S): 2.5 TABLET ORAL at 20:44

## 2017-10-03 RX ADMIN — FLUPHENAZINE HYDROCHLORIDE 15 MILLIGRAM(S): 1 TABLET, FILM COATED ORAL at 20:44

## 2017-10-03 RX ADMIN — Medication 1 MILLIGRAM(S): at 20:44

## 2017-10-04 LAB
ALBUMIN SERPL ELPH-MCNC: 3.8 G/DL — SIGNIFICANT CHANGE UP (ref 3.3–5)
ALP SERPL-CCNC: 114 U/L — SIGNIFICANT CHANGE UP (ref 40–120)
ALT FLD-CCNC: 33 U/L — SIGNIFICANT CHANGE UP (ref 4–33)
AST SERPL-CCNC: 26 U/L — SIGNIFICANT CHANGE UP (ref 4–32)
BASOPHILS # BLD AUTO: 0.02 K/UL — SIGNIFICANT CHANGE UP (ref 0–0.2)
BASOPHILS NFR BLD AUTO: 0.2 % — SIGNIFICANT CHANGE UP (ref 0–2)
BILIRUB SERPL-MCNC: 1.1 MG/DL — SIGNIFICANT CHANGE UP (ref 0.2–1.2)
BUN SERPL-MCNC: 10 MG/DL — SIGNIFICANT CHANGE UP (ref 7–23)
CALCIUM SERPL-MCNC: 9.4 MG/DL — SIGNIFICANT CHANGE UP (ref 8.4–10.5)
CHLORIDE SERPL-SCNC: 94 MMOL/L — LOW (ref 98–107)
CHOLEST SERPL-MCNC: 233 MG/DL — HIGH (ref 120–199)
CO2 SERPL-SCNC: 24 MMOL/L — SIGNIFICANT CHANGE UP (ref 22–31)
CREAT SERPL-MCNC: 0.82 MG/DL — SIGNIFICANT CHANGE UP (ref 0.5–1.3)
EOSINOPHIL # BLD AUTO: 0.09 K/UL — SIGNIFICANT CHANGE UP (ref 0–0.5)
EOSINOPHIL NFR BLD AUTO: 0.9 % — SIGNIFICANT CHANGE UP (ref 0–6)
FOLATE SERPL-MCNC: 16.5 NG/ML — SIGNIFICANT CHANGE UP (ref 4.7–20)
GLUCOSE SERPL-MCNC: 72 MG/DL — SIGNIFICANT CHANGE UP (ref 70–99)
HCT VFR BLD CALC: 48.3 % — HIGH (ref 34.5–45)
HDLC SERPL-MCNC: 67 MG/DL — HIGH (ref 45–65)
HGB BLD-MCNC: 16.4 G/DL — HIGH (ref 11.5–15.5)
IMM GRANULOCYTES # BLD AUTO: 0.02 # — SIGNIFICANT CHANGE UP
IMM GRANULOCYTES NFR BLD AUTO: 0.2 % — SIGNIFICANT CHANGE UP (ref 0–1.5)
LIPID PNL WITH DIRECT LDL SERPL: 135 MG/DL — SIGNIFICANT CHANGE UP
LYMPHOCYTES # BLD AUTO: 2.98 K/UL — SIGNIFICANT CHANGE UP (ref 1–3.3)
LYMPHOCYTES # BLD AUTO: 29.2 % — SIGNIFICANT CHANGE UP (ref 13–44)
MCHC RBC-ENTMCNC: 30.9 PG — SIGNIFICANT CHANGE UP (ref 27–34)
MCHC RBC-ENTMCNC: 34 % — SIGNIFICANT CHANGE UP (ref 32–36)
MCV RBC AUTO: 91 FL — SIGNIFICANT CHANGE UP (ref 80–100)
MONOCYTES # BLD AUTO: 0.8 K/UL — SIGNIFICANT CHANGE UP (ref 0–0.9)
MONOCYTES NFR BLD AUTO: 7.8 % — SIGNIFICANT CHANGE UP (ref 2–14)
NEUTROPHILS # BLD AUTO: 6.31 K/UL — SIGNIFICANT CHANGE UP (ref 1.8–7.4)
NEUTROPHILS NFR BLD AUTO: 61.7 % — SIGNIFICANT CHANGE UP (ref 43–77)
NRBC # FLD: 0 — SIGNIFICANT CHANGE UP
PLATELET # BLD AUTO: 168 K/UL — SIGNIFICANT CHANGE UP (ref 150–400)
PMV BLD: 13.6 FL — HIGH (ref 7–13)
POTASSIUM SERPL-MCNC: 3.7 MMOL/L — SIGNIFICANT CHANGE UP (ref 3.5–5.3)
POTASSIUM SERPL-SCNC: 3.7 MMOL/L — SIGNIFICANT CHANGE UP (ref 3.5–5.3)
PROT SERPL-MCNC: 7.2 G/DL — SIGNIFICANT CHANGE UP (ref 6–8.3)
RBC # BLD: 5.31 M/UL — HIGH (ref 3.8–5.2)
RBC # FLD: 13.2 % — SIGNIFICANT CHANGE UP (ref 10.3–14.5)
SODIUM SERPL-SCNC: 140 MMOL/L — SIGNIFICANT CHANGE UP (ref 135–145)
TRIGL SERPL-MCNC: 152 MG/DL — HIGH (ref 10–149)
VIT B12 SERPL-MCNC: 1219 PG/ML — HIGH (ref 200–900)
WBC # BLD: 10.22 K/UL — SIGNIFICANT CHANGE UP (ref 3.8–10.5)
WBC # FLD AUTO: 10.22 K/UL — SIGNIFICANT CHANGE UP (ref 3.8–10.5)

## 2017-10-04 PROCEDURE — 99232 SBSQ HOSP IP/OBS MODERATE 35: CPT

## 2017-10-04 RX ORDER — FLUPHENAZINE HYDROCHLORIDE 1 MG/1
5 TABLET, FILM COATED ORAL ONCE
Qty: 0 | Refills: 0 | Status: COMPLETED | OUTPATIENT
Start: 2017-10-04 | End: 2017-10-04

## 2017-10-04 RX ADMIN — Medication 1 MILLIGRAM(S): at 21:26

## 2017-10-04 RX ADMIN — FLUPHENAZINE HYDROCHLORIDE 5 MILLIGRAM(S): 1 TABLET, FILM COATED ORAL at 11:10

## 2017-10-04 RX ADMIN — NYSTATIN CREAM 1 APPLICATION(S): 100000 CREAM TOPICAL at 21:26

## 2017-10-04 RX ADMIN — FLUPHENAZINE HYDROCHLORIDE 5 MILLIGRAM(S): 1 TABLET, FILM COATED ORAL at 22:04

## 2017-10-04 RX ADMIN — AMLODIPINE BESYLATE 5 MILLIGRAM(S): 2.5 TABLET ORAL at 21:26

## 2017-10-05 LAB — T PALLIDUM AB TITR SER: NEGATIVE — SIGNIFICANT CHANGE UP

## 2017-10-05 PROCEDURE — 99232 SBSQ HOSP IP/OBS MODERATE 35: CPT

## 2017-10-05 RX ORDER — FLUPHENAZINE HYDROCHLORIDE 1 MG/1
5 TABLET, FILM COATED ORAL ONCE
Qty: 0 | Refills: 0 | Status: COMPLETED | OUTPATIENT
Start: 2017-10-05 | End: 2017-10-05

## 2017-10-05 RX ORDER — SIMVASTATIN 20 MG/1
10 TABLET, FILM COATED ORAL AT BEDTIME
Qty: 0 | Refills: 0 | Status: DISCONTINUED | OUTPATIENT
Start: 2017-10-05 | End: 2017-12-20

## 2017-10-05 RX ORDER — FLUPHENAZINE HYDROCHLORIDE 1 MG/1
10 TABLET, FILM COATED ORAL
Qty: 0 | Refills: 0 | Status: DISCONTINUED | OUTPATIENT
Start: 2017-10-05 | End: 2017-10-08

## 2017-10-05 RX ORDER — METOPROLOL TARTRATE 50 MG
50 TABLET ORAL ONCE
Qty: 0 | Refills: 0 | Status: COMPLETED | OUTPATIENT
Start: 2017-10-05 | End: 2017-10-05

## 2017-10-05 RX ORDER — FLUPHENAZINE HYDROCHLORIDE 1 MG/1
10 TABLET, FILM COATED ORAL DAILY
Qty: 0 | Refills: 0 | Status: DISCONTINUED | OUTPATIENT
Start: 2017-10-05 | End: 2017-10-08

## 2017-10-05 RX ADMIN — FLUPHENAZINE HYDROCHLORIDE 5 MILLIGRAM(S): 1 TABLET, FILM COATED ORAL at 13:10

## 2017-10-05 RX ADMIN — FLUPHENAZINE HYDROCHLORIDE 5 MILLIGRAM(S): 1 TABLET, FILM COATED ORAL at 21:34

## 2017-10-05 RX ADMIN — Medication 1 MILLIGRAM(S): at 14:03

## 2017-10-06 ENCOUNTER — EMERGENCY (EMERGENCY)
Facility: HOSPITAL | Age: 72
LOS: 1 days | Discharge: HOME | End: 2017-10-06
Attending: EMERGENCY MEDICINE | Admitting: EMERGENCY MEDICINE
Payer: MEDICARE

## 2017-10-06 VITALS
RESPIRATION RATE: 18 BRPM | OXYGEN SATURATION: 98 % | SYSTOLIC BLOOD PRESSURE: 162 MMHG | HEART RATE: 88 BPM | DIASTOLIC BLOOD PRESSURE: 91 MMHG

## 2017-10-06 VITALS
TEMPERATURE: 98 F | DIASTOLIC BLOOD PRESSURE: 109 MMHG | RESPIRATION RATE: 18 BRPM | OXYGEN SATURATION: 98 % | SYSTOLIC BLOOD PRESSURE: 154 MMHG | HEART RATE: 91 BPM

## 2017-10-06 DIAGNOSIS — Z90.710 ACQUIRED ABSENCE OF BOTH CERVIX AND UTERUS: Chronic | ICD-10-CM

## 2017-10-06 LAB
ALBUMIN SERPL ELPH-MCNC: 3.3 G/DL — SIGNIFICANT CHANGE UP (ref 3.3–5)
ALP SERPL-CCNC: 302 U/L — HIGH (ref 40–120)
ALT FLD-CCNC: 116 U/L — HIGH (ref 4–33)
APTT BLD: 29.7 SEC — SIGNIFICANT CHANGE UP (ref 27.5–37.4)
AST SERPL-CCNC: 79 U/L — HIGH (ref 4–32)
BASOPHILS # BLD AUTO: 0.02 K/UL — SIGNIFICANT CHANGE UP (ref 0–0.2)
BASOPHILS NFR BLD AUTO: 0.2 % — SIGNIFICANT CHANGE UP (ref 0–2)
BILIRUB SERPL-MCNC: 1.2 MG/DL — SIGNIFICANT CHANGE UP (ref 0.2–1.2)
BUN SERPL-MCNC: 15 MG/DL — SIGNIFICANT CHANGE UP (ref 7–23)
CALCIUM SERPL-MCNC: 8.2 MG/DL — LOW (ref 8.4–10.5)
CHLORIDE SERPL-SCNC: 96 MMOL/L — LOW (ref 98–107)
CO2 SERPL-SCNC: 25 MMOL/L — SIGNIFICANT CHANGE UP (ref 22–31)
CREAT SERPL-MCNC: 0.77 MG/DL — SIGNIFICANT CHANGE UP (ref 0.5–1.3)
EOSINOPHIL # BLD AUTO: 0.07 K/UL — SIGNIFICANT CHANGE UP (ref 0–0.5)
EOSINOPHIL NFR BLD AUTO: 0.8 % — SIGNIFICANT CHANGE UP (ref 0–6)
GLUCOSE SERPL-MCNC: 148 MG/DL — HIGH (ref 70–99)
HCT VFR BLD CALC: 42 % — SIGNIFICANT CHANGE UP (ref 34.5–45)
HGB BLD-MCNC: 13.8 G/DL — SIGNIFICANT CHANGE UP (ref 11.5–15.5)
IMM GRANULOCYTES # BLD AUTO: 0.05 # — SIGNIFICANT CHANGE UP
IMM GRANULOCYTES NFR BLD AUTO: 0.6 % — SIGNIFICANT CHANGE UP (ref 0–1.5)
INR BLD: 1.03 — SIGNIFICANT CHANGE UP (ref 0.88–1.17)
LYMPHOCYTES # BLD AUTO: 2.16 K/UL — SIGNIFICANT CHANGE UP (ref 1–3.3)
LYMPHOCYTES # BLD AUTO: 23.8 % — SIGNIFICANT CHANGE UP (ref 13–44)
MCHC RBC-ENTMCNC: 30.3 PG — SIGNIFICANT CHANGE UP (ref 27–34)
MCHC RBC-ENTMCNC: 32.9 % — SIGNIFICANT CHANGE UP (ref 32–36)
MCV RBC AUTO: 92.1 FL — SIGNIFICANT CHANGE UP (ref 80–100)
MONOCYTES # BLD AUTO: 0.72 K/UL — SIGNIFICANT CHANGE UP (ref 0–0.9)
MONOCYTES NFR BLD AUTO: 7.9 % — SIGNIFICANT CHANGE UP (ref 2–14)
NEUTROPHILS # BLD AUTO: 6.05 K/UL — SIGNIFICANT CHANGE UP (ref 1.8–7.4)
NEUTROPHILS NFR BLD AUTO: 66.7 % — SIGNIFICANT CHANGE UP (ref 43–77)
NRBC # FLD: 0 — SIGNIFICANT CHANGE UP
PLATELET # BLD AUTO: 159 K/UL — SIGNIFICANT CHANGE UP (ref 150–400)
PMV BLD: 12.6 FL — SIGNIFICANT CHANGE UP (ref 7–13)
POTASSIUM SERPL-MCNC: 3.1 MMOL/L — LOW (ref 3.5–5.3)
POTASSIUM SERPL-SCNC: 3.1 MMOL/L — LOW (ref 3.5–5.3)
PROT SERPL-MCNC: 6.2 G/DL — SIGNIFICANT CHANGE UP (ref 6–8.3)
PROTHROM AB SERPL-ACNC: 11.5 SEC — SIGNIFICANT CHANGE UP (ref 9.8–13.1)
RBC # BLD: 4.56 M/UL — SIGNIFICANT CHANGE UP (ref 3.8–5.2)
RBC # FLD: 13.2 % — SIGNIFICANT CHANGE UP (ref 10.3–14.5)
SODIUM SERPL-SCNC: 140 MMOL/L — SIGNIFICANT CHANGE UP (ref 135–145)
WBC # BLD: 9.07 K/UL — SIGNIFICANT CHANGE UP (ref 3.8–10.5)
WBC # FLD AUTO: 9.07 K/UL — SIGNIFICANT CHANGE UP (ref 3.8–10.5)

## 2017-10-06 PROCEDURE — 73110 X-RAY EXAM OF WRIST: CPT | Mod: 26,LT

## 2017-10-06 PROCEDURE — 73100 X-RAY EXAM OF WRIST: CPT | Mod: 26,59,LT

## 2017-10-06 PROCEDURE — 99285 EMERGENCY DEPT VISIT HI MDM: CPT

## 2017-10-06 PROCEDURE — 99232 SBSQ HOSP IP/OBS MODERATE 35: CPT

## 2017-10-06 PROCEDURE — 71010: CPT | Mod: 26

## 2017-10-06 PROCEDURE — 73502 X-RAY EXAM HIP UNI 2-3 VIEWS: CPT | Mod: 26,LT

## 2017-10-06 PROCEDURE — 73090 X-RAY EXAM OF FOREARM: CPT | Mod: 26,LT

## 2017-10-06 RX ORDER — HALOPERIDOL DECANOATE 100 MG/ML
5 INJECTION INTRAMUSCULAR ONCE
Qty: 0 | Refills: 0 | Status: DISCONTINUED | OUTPATIENT
Start: 2017-10-06 | End: 2017-10-06

## 2017-10-06 RX ORDER — FLUPHENAZINE HYDROCHLORIDE 1 MG/1
5 TABLET, FILM COATED ORAL ONCE
Qty: 0 | Refills: 0 | Status: COMPLETED | OUTPATIENT
Start: 2017-10-06 | End: 2017-10-06

## 2017-10-06 RX ORDER — HALOPERIDOL DECANOATE 100 MG/ML
5 INJECTION INTRAMUSCULAR ONCE
Qty: 0 | Refills: 0 | Status: COMPLETED | OUTPATIENT
Start: 2017-10-06 | End: 2017-10-06

## 2017-10-06 RX ORDER — AMLODIPINE BESYLATE 2.5 MG/1
10 TABLET ORAL AT BEDTIME
Qty: 0 | Refills: 0 | Status: DISCONTINUED | OUTPATIENT
Start: 2017-10-06 | End: 2017-11-28

## 2017-10-06 RX ORDER — MIDAZOLAM HYDROCHLORIDE 1 MG/ML
2 INJECTION, SOLUTION INTRAMUSCULAR; INTRAVENOUS ONCE
Qty: 0 | Refills: 0 | Status: DISCONTINUED | OUTPATIENT
Start: 2017-10-06 | End: 2017-10-06

## 2017-10-06 RX ADMIN — HALOPERIDOL DECANOATE 5 MILLIGRAM(S): 100 INJECTION INTRAMUSCULAR at 17:25

## 2017-10-06 RX ADMIN — MIDAZOLAM HYDROCHLORIDE 2 MILLIGRAM(S): 1 INJECTION, SOLUTION INTRAMUSCULAR; INTRAVENOUS at 23:44

## 2017-10-06 RX ADMIN — Medication 2 MILLIGRAM(S): at 17:25

## 2017-10-06 RX ADMIN — Medication 1 MILLIGRAM(S): at 11:28

## 2017-10-06 RX ADMIN — HALOPERIDOL DECANOATE 5 MILLIGRAM(S): 100 INJECTION INTRAMUSCULAR at 19:17

## 2017-10-06 RX ADMIN — Medication 2 MILLIGRAM(S): at 19:17

## 2017-10-06 RX ADMIN — FLUPHENAZINE HYDROCHLORIDE 5 MILLIGRAM(S): 1 TABLET, FILM COATED ORAL at 11:28

## 2017-10-06 NOTE — CHART NOTE - NSCHARTNOTEFT_GEN_A_CORE
[facility] Inpatient to ED Transfer Summary    Reason for Transfer/Medical Summary: Pt with bruises on left forearm/arm, left eye, left hip. Please r/o fracture of left wrist/hand/shoulder/hip. unable to. She has h/o schizophrenia, past psychiatric hospitalization. At this time, aggressive, agitated, noncompliant.       PAST MEDICAL & SURGICAL HISTORY:  Hypertension  Schizophrenia  No significant past surgical history      Allergies    No Known Allergies    Intolerances      MEDICATIONS  (STANDING):  amLODIPine   Tablet 5 milliGRAM(s) Oral at bedtime  fluPHENAZine 10 milliGRAM(s) Oral daily  fluPHENAZine 10 milliGRAM(s) Oral <User Schedule>  nystatin Powder 1 Application(s) Topical two times a day  simvastatin 10 milliGRAM(s) Oral at bedtime    MEDICATIONS  (PRN):  fluPHENAZine 5 milliGRAM(s) Oral every 6 hours PRN AGITATION  fluPHENAZine  Injectable 5 milliGRAM(s) IntraMuscular once PRN WHEN REFUSES PO PROLIXIN AS PER COURT ORDER  fluPHENAZine  Injectable 5 milliGRAM(s) IntraMuscular once PRN AGITATION  LORazepam   Injectable 1 milliGRAM(s) IntraMuscular once PRN Agitation      Vital Signs Last 24 Hrs  T(C): --  T(F): --  HR: 87 (05 Oct 2017 15:30) (87 - 135)  BP: 150/125 (05 Oct 2017 15:30) (150/125 - 194/142)  BP(mean): --  RR: 18 (05 Oct 2017 19:45) (18 - 18)  SpO2: --  CAPILLARY BLOOD GLUCOSE            PHYSICAL EXAM:  GENERAL: NAD, well-developed  HEAD:  Atraumatic, Normocephalic  EYES: EOMI, PERRLA, conjunctiva and sclera clear  NECK: Supple, No JVD  CHEST/LUNG: Clear to auscultation bilaterally; No wheeze(   HEART: Regular rate and rhythm; No murmurs, rubs, or gallops  ABDOMEN: Soft, Nontender, Nondistended; Bowel sounds present  EXTREMITIES:  2+ Peripheral Pulses, No clubbing, cyanosis, or edema  PSYCH: AAOx3  NEUROLOGY: non-focal  SKIN: No rashes or lesions    LABS:                    Psychiatry Section:  Psychiatric Summary/[facility] admitting diagnosis:    Psychiatric Recommendations:    Observation status (check one):   ( ) Constant Observation  ( ) Enhanced care  ( ) Routine checks    Risk Status (check all that apply if present):  ( ) at risk for suicide/self-injury  ( ) at risk for aggressive behavior  ( ) at risk for elopement  ( ) other risk:

## 2017-10-06 NOTE — ED PROVIDER NOTE - SKIN COLOR
bruising to L forearm, swollen; pulses intact; small ecchymosis around L eye, full ROM eye and no proptosis

## 2017-10-06 NOTE — ED ADULT NURSE NOTE - OBJECTIVE STATEMENT
Relief RN: Pt from  received to rm 13 accompanied by 2  aides p/w bruising to arms    PER RN Maurizio from [facility]: Staff noticed bruising of L arm, eye and hip approx 2 days ago. Unclear if patient fell or self injurious behavior. Bruising has been getting worse over the past few days. Patient has not had any episodes of vomiting. Patient walks at baseline and continues to move all extremities without difficulty. No change in mental status from baseline. Patient typically swings/kicks at staff when they attempt to examine/feed her. Poor PO intake for the past few weeks. No other complaints/change in vitals.

## 2017-10-06 NOTE — ED ADULT NURSE NOTE - CHIEF COMPLAINT QUOTE
BIBEMS from [facility] (schitzophrenia), presents for unexplained bruising to left forearm, left eye bruising, left hip bruising. pt combative and aggressive at baseline. 2 [facility] staff with pt. as per EMS pt was medicated wit ativan and prolixa at 11am

## 2017-10-06 NOTE — ED ADULT TRIAGE NOTE - CHIEF COMPLAINT QUOTE
BIBEMS from [facility] (schitzophrenia), presents for unexplained bruising to left forearm, left eye bruising, left hip bruising. pt combative and aggressive at baseline. 2 [facility] staff with pt. as per EMS pt was medicated wit ativan and prolixa at 11am

## 2017-10-06 NOTE — ED ADULT NURSE REASSESSMENT NOTE - NS ED NURSE REASSESS COMMENT FT1
Er report taking from previous RN pt coming from [facility] for left wrist swelling/ecchymotic pt not cooperative with 2:1 at bed side, a S.L. was pulled out by pt, IM meds given at present time.   Lungs clear, resp. equal. 20b/min, pending dispo.    Nati Cook RN

## 2017-10-06 NOTE — CONSULT NOTE ADULT - SUBJECTIVE AND OBJECTIVE BOX
72 year old female with psych disorder now with exacerbation.  Pt very aggressive and combative chasing staff out of room screaming, swinging upper extremities and kicking.  Pt refusing medications.  Pt s/p court order 10/03 for medications over objection.  Today staff notes pt with swollen left eye upper eye lid and bruises on left fore arm and left hip.  Pt is a poor historian and not cooperative.    PE:  HYPERAGGRESIVE FEMALE UNCOOPERATIVE, ANGRY SCREAMING.  LEFT EYE CONJ PINK, SCLERAE CLEAR.  LEFT UPPER EYE LID SWOLLEN.  LEFT FORE ARM 5 CM2 AREA OF ECCHYMOSIS.  +FROM OF LUE SHOULDER, ELBOW AND WRISTS.  LEFT HIP:  FOUR SMALL 1 CM2 ECCHYMOTIC AREAS.

## 2017-10-06 NOTE — CONSULT NOTE ADULT - ASSESSMENT
72 year old female with psych disorder now with exacerbation, uncooperative, aggressive s/p court order for medications over objection.  1. Left eye upper eye lid swollen.  Pt most likely with self trauma rubbing eye.  Suggest warm soaks to left eye but pt will not cooperative or allow.  2. Left fore arm bruise 5 cm2.  Most likely self trauma.  Pt moves left upper extremity without hesitation with full range of motion.  3. HTN:  encourage pt to comply with hs Amlodipine 5 mg hs.  4. Psych: as per attending.

## 2017-10-06 NOTE — ED PROVIDER NOTE - OBJECTIVE STATEMENT
72F with HTN, schizophrenia sent from [facility] for bruising to L arm. Pt with bruising to most of L forearm noted today, also with small amount of bruising L eye. Seen at [facility] by medicine and thought to be 2/2 accidental self trauma, however pt sent to ED for further eval. Pt combative and aggressive on eval, not answering questions, endorses emotional pain but unable to describe physical pain. 72F with HTN, schizophrenia sent from [facility] for bruising to L arm. Pt with bruising to most of L forearm noted today, also with small amount of bruising L eye. Seen at [facility] by medicine and thought to be 2/2 accidental self trauma, however pt sent to ED for further eval. Pt combative and aggressive on eval, not answering questions, endorses emotional pain but unable to describe physical pain.    Additional history taken from Maurizio MCGRAW from [facility]. Staff noticed bruising of L arm, eye and hip approx 2 days ago. Unclear if patient fell or self injurious behavior. Bruising has been getting worse over the past few days. Patient has not had any episodes of vomiting. Patient walks at baseline and continues to move all extremities without difficulty. No change in mental status from baseline. Patient typically swings/kicks at staff when they attempt to examine/feed her. Poor PO intake for the past few weeks. No other complaints/change in vitals.

## 2017-10-06 NOTE — ED PROVIDER NOTE - MEDICAL DECISION MAKING DETAILS
Pt with significant bruising/swelling to L forearm, small bruise L eye. Pulses intact, nontender, low concern for compartment syndrome. Labs, xray r/o trauma, reassess

## 2017-10-06 NOTE — ED PROVIDER NOTE - PROGRESS NOTE DETAILS
spoke with ortho resident spoke with ortho resident, who said he will reduce and splint pt's L rad/ulnar fx, then do a f/u X-ray, then resident will let the ED team know when pt can be transferred back to [facility] with ortho f/u on Wed ([phone]) with recommendations to [facility] for oxycodone 5 mg q4 to 6 hours PRN for pain for 5 days

## 2017-10-06 NOTE — ED PROVIDER NOTE - ATTENDING CONTRIBUTION TO CARE
Dr. Garcia:  I have personally performed a face to face bedside history and physical examination of this patient. I have discussed the history, examination, review of systems, assessment and plan of management with the resident. I have reviewed the electronic medical record and amended it to reflect my history, review of systems, physical exam, assessment and plan.    72F h/o HTN, schizophrenia, sent from [facility] for bruising noted to LUE.  Pt unable to provide any history, agitated and combative.    Exam:  - nad  - +small area of erythema over lid eyelid, head atraumatic otherwise  - rrr  - ctab  - abd soft ntnd  - +ecchymosis and swelling of left forearm, +deformity at wrist    A/P  - concern for trauma  - cbc, bmp  - XR wrist and forearm, pelvis XR

## 2017-10-06 NOTE — CONSULT NOTE ADULT - CONSULT REASON
Asked by attending tosee this 72 year old female with psych disorder with left eye  upper lid swelling and left fore arm bruise.

## 2017-10-07 ENCOUNTER — EMERGENCY (EMERGENCY)
Facility: HOSPITAL | Age: 72
LOS: 1 days | Discharge: HOME | End: 2017-10-07
Attending: EMERGENCY MEDICINE | Admitting: EMERGENCY MEDICINE
Payer: MEDICARE

## 2017-10-07 VITALS
DIASTOLIC BLOOD PRESSURE: 80 MMHG | OXYGEN SATURATION: 96 % | HEART RATE: 82 BPM | RESPIRATION RATE: 18 BRPM | SYSTOLIC BLOOD PRESSURE: 132 MMHG

## 2017-10-07 DIAGNOSIS — Z90.710 ACQUIRED ABSENCE OF BOTH CERVIX AND UTERUS: Chronic | ICD-10-CM

## 2017-10-07 PROCEDURE — 99232 SBSQ HOSP IP/OBS MODERATE 35: CPT

## 2017-10-07 PROCEDURE — 73100 X-RAY EXAM OF WRIST: CPT | Mod: 26,59,LT

## 2017-10-07 PROCEDURE — 73110 X-RAY EXAM OF WRIST: CPT | Mod: 26,LT

## 2017-10-07 PROCEDURE — 99285 EMERGENCY DEPT VISIT HI MDM: CPT

## 2017-10-07 PROCEDURE — 73100 X-RAY EXAM OF WRIST: CPT | Mod: 26,59,76,LT

## 2017-10-07 RX ORDER — HALOPERIDOL DECANOATE 100 MG/ML
2 INJECTION INTRAMUSCULAR EVERY 6 HOURS
Qty: 0 | Refills: 0 | Status: DISCONTINUED | OUTPATIENT
Start: 2017-10-07 | End: 2017-10-08

## 2017-10-07 RX ORDER — HALOPERIDOL DECANOATE 100 MG/ML
5 INJECTION INTRAMUSCULAR ONCE
Qty: 0 | Refills: 0 | Status: COMPLETED | OUTPATIENT
Start: 2017-10-07 | End: 2017-10-07

## 2017-10-07 RX ORDER — OXYCODONE HYDROCHLORIDE 5 MG/1
5 TABLET ORAL EVERY 6 HOURS
Qty: 0 | Refills: 0 | Status: DISCONTINUED | OUTPATIENT
Start: 2017-10-07 | End: 2017-10-09

## 2017-10-07 RX ORDER — ACETAMINOPHEN 500 MG
650 TABLET ORAL EVERY 6 HOURS
Qty: 0 | Refills: 0 | Status: DISCONTINUED | OUTPATIENT
Start: 2017-10-07 | End: 2017-12-20

## 2017-10-07 RX ORDER — HALOPERIDOL DECANOATE 100 MG/ML
2 INJECTION INTRAMUSCULAR ONCE
Qty: 0 | Refills: 0 | Status: DISCONTINUED | OUTPATIENT
Start: 2017-10-07 | End: 2017-10-08

## 2017-10-07 RX ORDER — KETAMINE HYDROCHLORIDE 100 MG/ML
70 INJECTION INTRAMUSCULAR; INTRAVENOUS ONCE
Qty: 0 | Refills: 0 | Status: DISCONTINUED | OUTPATIENT
Start: 2017-10-07 | End: 2017-10-07

## 2017-10-07 RX ORDER — DIPHENHYDRAMINE HCL 50 MG
50 CAPSULE ORAL ONCE
Qty: 0 | Refills: 0 | Status: COMPLETED | OUTPATIENT
Start: 2017-10-07 | End: 2017-10-07

## 2017-10-07 RX ADMIN — OXYCODONE HYDROCHLORIDE 5 MILLIGRAM(S): 5 TABLET ORAL at 02:35

## 2017-10-07 RX ADMIN — FLUPHENAZINE HYDROCHLORIDE 10 MILLIGRAM(S): 1 TABLET, FILM COATED ORAL at 08:32

## 2017-10-07 RX ADMIN — Medication 2 MILLIGRAM(S): at 17:48

## 2017-10-07 RX ADMIN — KETAMINE HYDROCHLORIDE 70 MILLIGRAM(S): 100 INJECTION INTRAMUSCULAR; INTRAVENOUS at 22:47

## 2017-10-07 RX ADMIN — HALOPERIDOL DECANOATE 2 MILLIGRAM(S): 100 INJECTION INTRAMUSCULAR at 14:40

## 2017-10-07 RX ADMIN — OXYCODONE HYDROCHLORIDE 5 MILLIGRAM(S): 5 TABLET ORAL at 04:07

## 2017-10-07 RX ADMIN — Medication 2 MILLIGRAM(S): at 20:07

## 2017-10-07 RX ADMIN — HALOPERIDOL DECANOATE 5 MILLIGRAM(S): 100 INJECTION INTRAMUSCULAR at 17:47

## 2017-10-07 RX ADMIN — Medication 650 MILLIGRAM(S): at 12:40

## 2017-10-07 RX ADMIN — Medication 50 MILLIGRAM(S): at 17:47

## 2017-10-07 NOTE — ED ADULT NURSE REASSESSMENT NOTE - NS ED NURSE REASSESS COMMENT FT1
Pt in bed asleep easily aroused, no acute distress noted, pt from [facility], aide at bedside, constant observation maintained. 22 gauge heplock inserted to right inner arm by LUCIEN Feliciano. awaiting ortho.

## 2017-10-07 NOTE — ED PROVIDER NOTE - PLAN OF CARE
1) Follow up with your doctor in 2 weeks.   2) Return to the ER immediately for new or worsening symptoms including numbness or severe pain in the fingers. or removal of the cast.   3) Take Motrin 400mg every 6 hours for pain. DO NOT REMOVE THE CAST.

## 2017-10-07 NOTE — ED ADULT NURSE NOTE - OBJECTIVE STATEMENT
Patient received to room 29. Aox0, screaming profanities at staff and trying to kick staff. Refuses to answer questions but has severe bruising to left arm. Patient received to room 29. Aox0, screaming profanities at staff and trying to kick staff. Refuses to answer questions but has severe bruising to left arm. Staff at bedside states she was here last night for fx of left arm, had soft cast applied. Pulled cast off today. Medicated as ordered. NAD. Will reassess.

## 2017-10-07 NOTE — CHART NOTE - NSCHARTNOTEFT_GEN_A_CORE
[facility] Inpatient to ED Transfer Summary    Reason for Transfer/Medical Summary:  72 year-old F with schizophrenia who was transferred to [facility] ED yesterday 10/6/17 for bruises on left forearm/arm, left eye, left hip; patient found to have L rad/ulnar fx, seen by ortho and fracture was reduced and splinted, put in ACE bandage and sent back to [facility].  Today patient removed entire ACE bandage and splint.  Spoke with ED resident-in-charge, who recommended patient return to ED to have splinta nd ACE bandage reapplied by ortho.      PAST MEDICAL & SURGICAL HISTORY:  Hypertension  Schizophrenia  No significant past surgical history      Allergies    No Known Allergies    Intolerances        MEDICATIONS  (STANDING):  amLODIPine   Tablet 10 milliGRAM(s) Oral at bedtime  fluPHENAZine 10 milliGRAM(s) Oral daily  fluPHENAZine 10 milliGRAM(s) Oral <User Schedule>  nystatin Powder 1 Application(s) Topical two times a day  simvastatin 10 milliGRAM(s) Oral at bedtime    MEDICATIONS  (PRN):  acetaminophen   Tablet 650 milliGRAM(s) Oral every 6 hours PRN mild-moderate pain  fluPHENAZine  Injectable 5 milliGRAM(s) IntraMuscular once PRN WHEN REFUSES PO PROLIXIN AS PER COURT ORDER  haloperidol     Tablet 2 milliGRAM(s) Oral every 6 hours PRN agitation  haloperidol    Injectable 2 milliGRAM(s) IntraMuscular once PRN agitation  LORazepam   Injectable 1 milliGRAM(s) IntraMuscular once PRN Agitation  oxyCODONE    IR 5 milliGRAM(s) Oral every 6 hours PRN Severe Pain (7 - 10)      Vital Signs Last 24 Hrs  T(C): 36.9 (07 Oct 2017 08:06), Max: 36.9 (07 Oct 2017 08:06)  T(F): 98.4 (07 Oct 2017 08:06), Max: 98.4 (07 Oct 2017 08:06)  HR: 57 (07 Oct 2017 02:18) (57 - 88)  BP: 128/70 (07 Oct 2017 02:18) (128/70 - 162/91)  BP(mean): --  RR: 16 (07 Oct 2017 08:06) (16 - 18)  SpO2: 98% (06 Oct 2017 22:02) (98% - 98%)  CAPILLARY BLOOD GLUCOSE            PHYSICAL EXAM:  GENERAL: NAD, well-developed  HEAD:  Atraumatic, Normocephalic  EYES: EOMI, PERRLA, conjunctiva and sclera clear  NECK: Supple, No JVD  CHEST/LUNG: Clear to auscultation bilaterally; No wheeze  HEART: Regular rate and rhythm; No murmurs, rubs, or gallops  ABDOMEN: Soft, Nontender, Nondistended; Bowel sounds present  EXTREMITIES:  2+ Peripheral Pulses, No clubbing, cyanosis, or edema  PSYCH: AAOx3  NEUROLOGY: non-focal  SKIN: No rashes or lesions    LABS:                        13.8   9.07  )-----------( 159      ( 06 Oct 2017 18:04 )             42.0     10-06    140  |  96<L>  |  15  ----------------------------<  148<H>  3.1<L>   |  25  |  0.77    Ca    8.2<L>      06 Oct 2017 18:04    TPro  6.2  /  Alb  3.3  /  TBili  1.2  /  DBili  x   /  AST  79<H>  /  ALT  116<H>  /  AlkPhos  302<H>  10-06    PT/INR - ( 06 Oct 2017 18:04 )   PT: 11.5 SEC;   INR: 1.03          PTT - ( 06 Oct 2017 18:04 )  PTT:29.7 SEC          Psychiatry Section:  Psychiatric Summary/[facility] admitting diagnosis:  Schizophrenia    Psychiatric Recommendations:    Prolixin 10mg PO daily and 10mg QHS (21:00)  Haldol 2mg PO/IM q6h PRN agitation    Observation status (check one):   (X) Constant Observation  ( ) Enhanced care  ( ) Routine checks    Risk Status (check all that apply if present):  ( ) at risk for suicide/self-injury  (X) at risk for aggressive behavior  ( ) at risk for elopement  ( ) other risk:

## 2017-10-07 NOTE — ED ADULT TRIAGE NOTE - CHIEF COMPLAINT QUOTE
Pt sent from [facility] for pt repeatedly removing soft cast for L arm fracture.  PMH schizophrenia

## 2017-10-07 NOTE — ED PROVIDER NOTE - ATTENDING CONTRIBUTION TO CARE
VERONICA Attending Note - Dr. Machuca   72F h/o HTN and schizophrenia admitted to [facility] for schizophrenia, seen in the ED yesterday with bruising of the L arm and above L eye and found to have L forearm fx, which was casted by ortho. Pt returned to [facility], where she tore off her cast PE: pt is aggressive and combative requiring sedation to allow examination ecchymosis around rt eye,  neck supple. no lymphadenopathy or thyroid enlargement, No JVD.  Chest clear to P&A, Heart- reg rhythm without murmur, rubs or gallops, radial pulses equal bilaterally.  Abd is soft, non-tender, Bowel sounds are active. no mass or organomegaly. : No CVA tenderness. Neuro:  Pt  Motor function is 5/5 strength bilaterally.  No focal deficits. Extremities:  swelling of left wrist.  Skin: warm and dry.  Impression:    Plan: VERONICA Attending Note - Dr. Machuca   72F h/o HTN and schizophrenia admitted to [facility] for schizophrenia, seen in the ED yesterday with bruising of the L arm and above L eye and found to have L forearm fx, which was casted by ortho. Pt returned to [facility], where she tore off her cast PE: pt is aggressive and combative requiring sedation to allow examination ecchymosis around rt eye,  neck supple. no lymphadenopathy or thyroid enlargement, No JVD.  Chest clear to P&A, Heart- reg rhythm without murmur, rubs or gallops, radial pulses equal bilaterally.  Abd is soft, non-tender, Bowel sounds are active. no mass or organomegaly. : No CVA tenderness. Neuro:  Pt  Motor function is 5/5 strength bilaterally.  No focal deficits. Extremities:  swelling of left wrist.  Skin: warm and dry.  Impression:  Fracture of left arm  Plan: x-rays to r/o change in fracture. sedation with Haldol and ativan. Orthopedic consult to recast patient and Ketamine to provide disassociated analgesia.

## 2017-10-07 NOTE — ED ADULT NURSE NOTE - CHIEF COMPLAINT QUOTE
Pt sent from [facility] for pt repeatedly removing soft cast for L arm fracture.  PMH schizophrenia

## 2017-10-07 NOTE — ED PROVIDER NOTE - NEUROLOGICAL, MLM
Alert, moving all extremities spontaneously. Unable to evaluate sensation of L hand 2/2 poor pt cooperation with exam.

## 2017-10-07 NOTE — ED PROVIDER NOTE - CARE PLAN
Principal Discharge DX:	Fracture  Instructions for follow-up, activity and diet:	1) Follow up with your doctor in 2 weeks.   2) Return to the ER immediately for new or worsening symptoms including numbness or severe pain in the fingers. or removal of the cast.   3) Take Motrin 400mg every 6 hours for pain. DO NOT REMOVE THE CAST.

## 2017-10-07 NOTE — CONSULT NOTE ADULT - SUBJECTIVE AND OBJECTIVE BOX
72y Female presents s/p mechanical fall onto left arm.  Patient was reduced and casted in Er yesterday.  Pulled off cast today.  Patient noncompliant with interview.  PAST MEDICAL & SURGICAL HISTORY:  Hypertension  Schizophrenia  No significant past surgical history    MEDICATIONS  (STANDING):    MEDICATIONS  (PRN):    No Known Allergies      Physical Exam  T(C): 36.9 (10-07-17 @ 08:06), Max: 36.9 (10-07-17 @ 08:06)  HR: 62 (10-07-17 @ 22:47) (57 - 82)  BP: 156/87 (10-07-17 @ 22:47) (128/70 - 156/87)  RR: 17 (10-07-17 @ 22:47) (16 - 18)  SpO2: 98% (10-07-17 @ 22:47) (96% - 98%)  Wt(kg): --    Gen: NAD   LUE: skin intact  AIN/PIN/U intact  SILT M/U/R  2+ radial pulses, cap refill < 2s    Imaging  X-ray- Left comminuted distal radius fracture    Procedure: , the fracture was close-reduced under fluouroscopic guidance and placed in a short arm cast. Post-reduction X-rays confirmed improved alignment. Patient was NVI following reduction.    A/P: 72y Female s/p closed-reduction and casting of left distal radius fracture  - pain control  - elevate affected extremity  - cast precautions  - follow-up with ortho clinic in 1 week.  - NWMARYANA RICHARDSON

## 2017-10-07 NOTE — ED PROVIDER NOTE - OBJECTIVE STATEMENT
72F admitted to [facility]    On evaluation, pt refusing to cooperate, not answering questions, verbally threatening and attempting to kick staff. For pt and staff safety, gave sedation meds to facilitate physical exam, r/p Xray, and re-casting. 72F h/o HTN and schizophrenia admitted to [facility] for schizophrenia/depression, seen in the ED yesterday    On evaluation, pt refusing to cooperate, not answering questions, verbally threatening and attempting to kick staff. For pt and staff safety, gave sedation meds to facilitate physical exam, r/p Xray, and re-casting. 72F h/o HTN and schizophrenia admitted to [facility] for schizophrenia, seen in the ED yesterday with bruising of the L arm and above L eye and found to have L forearm fx, which was casted by ortho. Pt returned to [facility], where she tore off her cast. Unclear how pt fractured her L wrist, no witnessed falls at [facility]. On evaluation, pt refusing to cooperate, not answering questions, verbally threatening and attempting to kick staff. For pt and staff safety, gave sedation meds to facilitate physical exam/xrays/casting.

## 2017-10-07 NOTE — ED PROVIDER NOTE - PROGRESS NOTE DETAILS
spencer camarena - ortho bed side, placed cast. post reduction films done and in place. will dc back to [facility].

## 2017-10-07 NOTE — ED PROVIDER NOTE - MEDICAL DECISION MAKING DETAILS
72F with L forearm fx which she removed while at [facility]. Sedation meds given to facilitate physical exam and workup. Plan: repeat xray to eval for fx displacement, call ortho for repeat casting, reassess.

## 2017-10-07 NOTE — CONSULT NOTE ADULT - SUBJECTIVE AND OBJECTIVE BOX
74 year old female presented to the [facility] Emergency Department following fall at [facility] 2 days ago. Patient's providers began to notice bruising over her left forearm. They then sent to the [facility] ED for further evaluation. The was no reported head strike. Providers then sent to the [facility] ED when they noticed that the patient would occasionally express pain the left arm.    PAST MEDICAL & SURGICAL HISTORY:  Hypertension  Schizophrenia  No significant past surgical history    Vital Signs Last 24 Hrs  T(C): 36.7 (06 Oct 2017 13:49), Max: 36.7 (06 Oct 2017 13:49)  T(F): 98 (06 Oct 2017 13:49), Max: 98 (06 Oct 2017 13:49)  HR: 57 (07 Oct 2017 02:18) (57 - 91)  BP: 128/70 (07 Oct 2017 02:18) (128/70 - 162/91)  BP(mean): --  RR: 18 (06 Oct 2017 22:02) (16 - 18)  SpO2: 98% (06 Oct 2017 22:02) (98% - 98%)    XRay: dorsally displaced left distal radius fracture    Exam:  Gen: NAD, skin intact  Motor: AIN/PIN/Median/Radial/Ulnar nerve distributions grossly intact  Sensory: SILT Median/Radial/Ulnar Nerve Distributions  Vascular: 2+ Radial pulse    Procedure: closed reduction of left distal radius fracture, application of splint    Post Reduction XRay: fracture reduced in splint    A/P: 74 year old female with left distal radius  - Will need 1:1 at [facility] to prevent damaging splint  - Pain control  - Keep Upper Extremity elevated  - Non-Weight Bearing Upper Extremity  - Follow up at Orthopedics Clinic within 1 week. Call [phone] to schedule an appointment.

## 2017-10-08 ENCOUNTER — EMERGENCY (EMERGENCY)
Facility: HOSPITAL | Age: 72
LOS: 1 days | Discharge: HOME | End: 2017-10-08
Attending: EMERGENCY MEDICINE | Admitting: EMERGENCY MEDICINE
Payer: MEDICARE

## 2017-10-08 VITALS
HEART RATE: 56 BPM | RESPIRATION RATE: 16 BRPM | SYSTOLIC BLOOD PRESSURE: 115 MMHG | TEMPERATURE: 98 F | DIASTOLIC BLOOD PRESSURE: 70 MMHG | OXYGEN SATURATION: 100 %

## 2017-10-08 VITALS
DIASTOLIC BLOOD PRESSURE: 75 MMHG | SYSTOLIC BLOOD PRESSURE: 148 MMHG | HEART RATE: 77 BPM | RESPIRATION RATE: 18 BRPM | OXYGEN SATURATION: 98 %

## 2017-10-08 VITALS
OXYGEN SATURATION: 100 % | DIASTOLIC BLOOD PRESSURE: 56 MMHG | HEART RATE: 54 BPM | SYSTOLIC BLOOD PRESSURE: 148 MMHG | RESPIRATION RATE: 16 BRPM

## 2017-10-08 DIAGNOSIS — Z90.710 ACQUIRED ABSENCE OF BOTH CERVIX AND UTERUS: Chronic | ICD-10-CM

## 2017-10-08 PROCEDURE — 99285 EMERGENCY DEPT VISIT HI MDM: CPT | Mod: GC

## 2017-10-08 PROCEDURE — 99232 SBSQ HOSP IP/OBS MODERATE 35: CPT

## 2017-10-08 PROCEDURE — 73070 X-RAY EXAM OF ELBOW: CPT | Mod: 26,LT

## 2017-10-08 PROCEDURE — 73100 X-RAY EXAM OF WRIST: CPT | Mod: 26,76,LT

## 2017-10-08 PROCEDURE — 73090 X-RAY EXAM OF FOREARM: CPT | Mod: 26,LT

## 2017-10-08 RX ORDER — DIVALPROEX SODIUM 500 MG/1
500 TABLET, DELAYED RELEASE ORAL
Qty: 0 | Refills: 0 | Status: DISCONTINUED | OUTPATIENT
Start: 2017-10-08 | End: 2017-10-12

## 2017-10-08 RX ORDER — HALOPERIDOL DECANOATE 100 MG/ML
5 INJECTION INTRAMUSCULAR
Qty: 0 | Refills: 0 | Status: DISCONTINUED | OUTPATIENT
Start: 2017-10-08 | End: 2017-10-10

## 2017-10-08 RX ORDER — MIDAZOLAM HYDROCHLORIDE 1 MG/ML
2 INJECTION, SOLUTION INTRAMUSCULAR; INTRAVENOUS ONCE
Qty: 0 | Refills: 0 | Status: DISCONTINUED | OUTPATIENT
Start: 2017-10-08 | End: 2017-10-08

## 2017-10-08 RX ORDER — HALOPERIDOL DECANOATE 100 MG/ML
5 INJECTION INTRAMUSCULAR ONCE
Qty: 0 | Refills: 0 | Status: COMPLETED | OUTPATIENT
Start: 2017-10-08 | End: 2017-10-08

## 2017-10-08 RX ORDER — HALOPERIDOL DECANOATE 100 MG/ML
5 INJECTION INTRAMUSCULAR ONCE
Qty: 0 | Refills: 0 | Status: DISCONTINUED | OUTPATIENT
Start: 2017-10-08 | End: 2017-12-20

## 2017-10-08 RX ORDER — HALOPERIDOL DECANOATE 100 MG/ML
5 INJECTION INTRAMUSCULAR EVERY 6 HOURS
Qty: 0 | Refills: 0 | Status: DISCONTINUED | OUTPATIENT
Start: 2017-10-08 | End: 2017-12-20

## 2017-10-08 RX ORDER — CLONAZEPAM 1 MG
0.5 TABLET ORAL
Qty: 0 | Refills: 0 | Status: DISCONTINUED | OUTPATIENT
Start: 2017-10-08 | End: 2017-10-10

## 2017-10-08 RX ORDER — DIPHENHYDRAMINE HCL 50 MG
50 CAPSULE ORAL ONCE
Qty: 0 | Refills: 0 | Status: COMPLETED | OUTPATIENT
Start: 2017-10-08 | End: 2017-10-08

## 2017-10-08 RX ORDER — MORPHINE SULFATE 50 MG/1
4 CAPSULE, EXTENDED RELEASE ORAL ONCE
Qty: 0 | Refills: 0 | Status: DISCONTINUED | OUTPATIENT
Start: 2017-10-08 | End: 2017-10-08

## 2017-10-08 RX ORDER — HYDROMORPHONE HYDROCHLORIDE 2 MG/ML
1 INJECTION INTRAMUSCULAR; INTRAVENOUS; SUBCUTANEOUS ONCE
Qty: 0 | Refills: 0 | Status: DISCONTINUED | OUTPATIENT
Start: 2017-10-08 | End: 2017-10-08

## 2017-10-08 RX ORDER — HALOPERIDOL DECANOATE 100 MG/ML
5 INJECTION INTRAMUSCULAR ONCE
Qty: 0 | Refills: 0 | Status: DISCONTINUED | OUTPATIENT
Start: 2017-10-08 | End: 2017-10-08

## 2017-10-08 RX ORDER — DIVALPROEX SODIUM 500 MG/1
250 TABLET, DELAYED RELEASE ORAL
Qty: 0 | Refills: 0 | Status: DISCONTINUED | OUTPATIENT
Start: 2017-10-08 | End: 2017-10-08

## 2017-10-08 RX ADMIN — Medication 2 MILLIGRAM(S): at 09:50

## 2017-10-08 RX ADMIN — SIMVASTATIN 10 MILLIGRAM(S): 20 TABLET, FILM COATED ORAL at 20:32

## 2017-10-08 RX ADMIN — HALOPERIDOL DECANOATE 5 MILLIGRAM(S): 100 INJECTION INTRAMUSCULAR at 09:50

## 2017-10-08 RX ADMIN — HALOPERIDOL DECANOATE 5 MILLIGRAM(S): 100 INJECTION INTRAMUSCULAR at 20:32

## 2017-10-08 RX ADMIN — NYSTATIN CREAM 1 APPLICATION(S): 100000 CREAM TOPICAL at 21:31

## 2017-10-08 RX ADMIN — HALOPERIDOL DECANOATE 5 MILLIGRAM(S): 100 INJECTION INTRAMUSCULAR at 13:31

## 2017-10-08 RX ADMIN — HYDROMORPHONE HYDROCHLORIDE 1 MILLIGRAM(S): 2 INJECTION INTRAMUSCULAR; INTRAVENOUS; SUBCUTANEOUS at 14:37

## 2017-10-08 RX ADMIN — Medication 0.5 MILLIGRAM(S): at 20:32

## 2017-10-08 RX ADMIN — Medication 2 MILLIGRAM(S): at 13:58

## 2017-10-08 RX ADMIN — HYDROMORPHONE HYDROCHLORIDE 1 MILLIGRAM(S): 2 INJECTION INTRAMUSCULAR; INTRAVENOUS; SUBCUTANEOUS at 12:15

## 2017-10-08 RX ADMIN — AMLODIPINE BESYLATE 10 MILLIGRAM(S): 2.5 TABLET ORAL at 21:31

## 2017-10-08 RX ADMIN — DIVALPROEX SODIUM 500 MILLIGRAM(S): 500 TABLET, DELAYED RELEASE ORAL at 20:32

## 2017-10-08 RX ADMIN — Medication 50 MILLIGRAM(S): at 13:57

## 2017-10-08 NOTE — ED PROVIDER NOTE - OBJECTIVE STATEMENT
73 yo F with schizophrenia inpatient at [facility] Lo 5 transferred to [facility] ED today for removal of hard cast for radial fracture. DX with radial fracture on 10/6/17 for bruises on left forearm/arm. splinted and pt removed. sent to ED and fiberglass cast placed and pt removed. Sent again for casting. Pt given haldol/ativan by [facility] staff prior to arrival. Pt calm and cooperative currently

## 2017-10-08 NOTE — ED ADULT NURSE REASSESSMENT NOTE - NS ED NURSE REASSESS COMMENT FT1
[facility], awaiting EMS, will continue to monitor.

## 2017-10-08 NOTE — ED ADULT NURSE REASSESSMENT NOTE - NS ED NURSE REASSESS COMMENT FT1
Pt reassessed by Md Fenton, discharged to [facility] by md. no acute distress noted.
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
## 2017-10-08 NOTE — ED ADULT NURSE REASSESSMENT NOTE - NS ED NURSE REASSESS COMMENT FT1
ortho @ bedside for reduction, pt increasing aggitation, meds as per orders, aides remain @ bedside, awaiting further orders, will continue to monitor.

## 2017-10-08 NOTE — CHART NOTE - NSCHARTNOTEFT_GEN_A_CORE
[facility] Inpatient to ED Transfer Summary    Reason for Transfer/Medical Summary:  72 year-old F with schizophrenia who was transferred to [facility] ED 10/6/17 for bruises on left forearm/arm, left eye, left hip; patient found to have L rad/ulnar fx, seen by ortho and fracture was reduced and splinted, put in ACE bandage and sent back to [facility].  Yesterday patient removed entire ACE bandage and splint, sent back to ED and placed in hard cast. This AM, removed hard cast.    PAST MEDICAL & SURGICAL HISTORY:  Hypertension  Schizophrenia  Hypercholesteremia  Hypertension  Schizophrenia  No significant past surgical history  H/O: hysterectomy      Allergies    No Known Allergies    Intolerances        MEDICATIONS  (STANDING):  amLODIPine   Tablet 10 milliGRAM(s) Oral at bedtime  clonazePAM Tablet 0.5 milliGRAM(s) Oral two times a day  diVALproex  milliGRAM(s) Oral two times a day  haloperidol     Tablet 5 milliGRAM(s) Oral two times a day  nystatin Powder 1 Application(s) Topical two times a day  simvastatin 10 milliGRAM(s) Oral at bedtime    MEDICATIONS  (PRN):  acetaminophen   Tablet 650 milliGRAM(s) Oral every 6 hours PRN mild-moderate pain  haloperidol     Tablet 5 milliGRAM(s) Oral every 6 hours PRN agitation  haloperidol    Injectable 5 milliGRAM(s) IntraMuscular once PRN agitation  haloperidol    Injectable 5 milliGRAM(s) IntraMuscular once PRN WHEN PT REFUSES PO HALDOL AS PER COURT ORDER  LORazepam   Injectable 1 milliGRAM(s) IntraMuscular once PRN wehn pt refuses po depakote as per court order  LORazepam   Injectable 2 milliGRAM(s) IntraMuscular once PRN Agitation  oxyCODONE    IR 5 milliGRAM(s) Oral every 6 hours PRN Severe Pain (7 - 10)      Vital Signs Last 24 Hrs  T(C): 36.7 (08 Oct 2017 02:20), Max: 36.7 (08 Oct 2017 02:20)  T(F): 98 (08 Oct 2017 02:20), Max: 98 (08 Oct 2017 02:20)  HR: 77 (08 Oct 2017 02:20) (54 - 82)  BP: 159/81 (08 Oct 2017 02:20) (132/80 - 159/81)  BP(mean): --  RR: 18 (08 Oct 2017 02:20) (16 - 18)  SpO2: 98% (08 Oct 2017 02:20) (96% - 100%)  CAPILLARY BLOOD GLUCOSE            PHYSICAL EXAM:  GENERAL: NAD, well-developed  HEAD:  Atraumatic, Normocephalic  Refusing further physical exam    LABS:                        13.8   9.07  )-----------( 159      ( 06 Oct 2017 18:04 )             42.0     10-06    140  |  96<L>  |  15  ----------------------------<  148<H>  3.1<L>   |  25  |  0.77    Ca    8.2<L>      06 Oct 2017 18:04    TPro  6.2  /  Alb  3.3  /  TBili  1.2  /  DBili  x   /  AST  79<H>  /  ALT  116<H>  /  AlkPhos  302<H>  10-06    PT/INR - ( 06 Oct 2017 18:04 )   PT: 11.5 SEC;   INR: 1.03          PTT - ( 06 Oct 2017 18:04 )  PTT:29.7 SEC          Psychiatry Section:  Psychiatric Summary/[facility] admitting diagnosis: Schizophrenia    Psychiatric Recommendations:  Klonopin 0.5mg PO BID  Depakote 500mg PO BID  Haldol 5mg PO BID    Haldol 5mg PO/IM q6 PRN agitation  Ativan 2mg IM q6 PRN agitation    Observation status (check one):   (X) Constant Observation  ( ) Enhanced care  ( ) Routine checks    Risk Status (check all that apply if present):  ( ) at risk for suicide/self-injury  (X) at risk for aggressive behavior  (X) at risk for elopement  ( ) other risk:

## 2017-10-08 NOTE — CONSULT NOTE ADULT - SUBJECTIVE AND OBJECTIVE BOX
Vital Signs Last 24 Hrs  T(C): 36.7 (08 Oct 2017 02:20), Max: 36.7 (08 Oct 2017 02:20)  T(F): 98 (08 Oct 2017 02:20), Max: 98 (08 Oct 2017 02:20)  HR: 77 (08 Oct 2017 11:40) (54 - 82)  BP: 148/75 (08 Oct 2017 11:40) (132/80 - 159/81)  BP(mean): --  RR: 18 (08 Oct 2017 11:40) (16 - 18)  SpO2: 98% (08 Oct 2017 11:40) (96% - 100%)74 year old female initially presented to the [facility] Emergency Department following fall at [facility]. She was found to have left distal radius fracture and was reduced/splinted on 10/6. She removed her own splint and presented on 10/7. She was reduced again and casted. She now presents after removing her own cast.     PAST MEDICAL & SURGICAL HISTORY:  Hypertension  Schizophrenia  No significant past surgical history    XRay: dorsally displaced left distal radius fracture, lost reduction    Exam:  Gen: NAD, skin intact  Motor: AIN/PIN/Median/Radial/Ulnar nerve distributions grossly intact  Sensory: SILT Median/Radial/Ulnar Nerve Distributions  Vascular: 2+ Radial pulse    Procedure: closed reduction of left distal radius fracture, application of long arm cast    Post Reduction XRay: fracture reduced in long arm cast    A/P: 74 year old female with left distal radius  - Will need 1:1 at [facility] to prevent damaging cast  - Cast cannot get wet  - Pain control  - Keep Upper Extremity elevated  - Non-Weight Bearing Upper Extremity  - Follow up at [facility] within 1 week. Call [phone] to schedule an appointment.

## 2017-10-08 NOTE — PROVIDER CONTACT NOTE (OTHER) - ASSESSMENT
Medical team referred this case as pt has been medically cleared for discharge and need BLS going back to [facility]. Pt has female  staff from [facility] will be travelling with the pt. Writer contacted [facility] [phone] spoke with MsGonzalo Nargis and set up transportation then RN spoke with the [facility] dispatcher and pt will be picked up [facility]. Non emergent ambulance form was signed by MD and attached to the chart for EMS. Medical team was made aware of this intervention and  time.

## 2017-10-09 PROBLEM — Z00.00 ENCOUNTER FOR PREVENTIVE HEALTH EXAMINATION: Status: ACTIVE | Noted: 2017-10-09

## 2017-10-09 PROCEDURE — 99232 SBSQ HOSP IP/OBS MODERATE 35: CPT

## 2017-10-09 RX ORDER — OXYCODONE HYDROCHLORIDE 5 MG/1
5 TABLET ORAL EVERY 4 HOURS
Qty: 0 | Refills: 0 | Status: DISCONTINUED | OUTPATIENT
Start: 2017-10-09 | End: 2017-10-13

## 2017-10-09 RX ORDER — HALOPERIDOL DECANOATE 100 MG/ML
5 INJECTION INTRAMUSCULAR ONCE
Qty: 0 | Refills: 0 | Status: COMPLETED | OUTPATIENT
Start: 2017-10-09 | End: 2017-10-09

## 2017-10-09 RX ADMIN — AMLODIPINE BESYLATE 10 MILLIGRAM(S): 2.5 TABLET ORAL at 22:52

## 2017-10-09 RX ADMIN — NYSTATIN CREAM 1 APPLICATION(S): 100000 CREAM TOPICAL at 22:52

## 2017-10-09 RX ADMIN — OXYCODONE HYDROCHLORIDE 5 MILLIGRAM(S): 5 TABLET ORAL at 20:52

## 2017-10-09 RX ADMIN — Medication 2 MILLIGRAM(S): at 11:15

## 2017-10-09 RX ADMIN — OXYCODONE HYDROCHLORIDE 5 MILLIGRAM(S): 5 TABLET ORAL at 12:30

## 2017-10-09 RX ADMIN — OXYCODONE HYDROCHLORIDE 5 MILLIGRAM(S): 5 TABLET ORAL at 22:53

## 2017-10-09 RX ADMIN — DIVALPROEX SODIUM 500 MILLIGRAM(S): 500 TABLET, DELAYED RELEASE ORAL at 22:52

## 2017-10-09 RX ADMIN — SIMVASTATIN 10 MILLIGRAM(S): 20 TABLET, FILM COATED ORAL at 22:52

## 2017-10-09 RX ADMIN — Medication 0.5 MILLIGRAM(S): at 22:52

## 2017-10-09 RX ADMIN — OXYCODONE HYDROCHLORIDE 5 MILLIGRAM(S): 5 TABLET ORAL at 00:11

## 2017-10-09 RX ADMIN — Medication 0.5 MILLIGRAM(S): at 08:55

## 2017-10-09 RX ADMIN — OXYCODONE HYDROCHLORIDE 5 MILLIGRAM(S): 5 TABLET ORAL at 10:15

## 2017-10-09 RX ADMIN — Medication 650 MILLIGRAM(S): at 23:00

## 2017-10-09 RX ADMIN — NYSTATIN CREAM 1 APPLICATION(S): 100000 CREAM TOPICAL at 08:55

## 2017-10-09 RX ADMIN — DIVALPROEX SODIUM 500 MILLIGRAM(S): 500 TABLET, DELAYED RELEASE ORAL at 08:55

## 2017-10-09 RX ADMIN — OXYCODONE HYDROCHLORIDE 5 MILLIGRAM(S): 5 TABLET ORAL at 12:42

## 2017-10-09 RX ADMIN — OXYCODONE HYDROCHLORIDE 5 MILLIGRAM(S): 5 TABLET ORAL at 02:41

## 2017-10-09 RX ADMIN — HALOPERIDOL DECANOATE 5 MILLIGRAM(S): 100 INJECTION INTRAMUSCULAR at 22:52

## 2017-10-09 RX ADMIN — OXYCODONE HYDROCHLORIDE 5 MILLIGRAM(S): 5 TABLET ORAL at 08:30

## 2017-10-09 RX ADMIN — HALOPERIDOL DECANOATE 5 MILLIGRAM(S): 100 INJECTION INTRAMUSCULAR at 08:55

## 2017-10-09 RX ADMIN — HALOPERIDOL DECANOATE 5 MILLIGRAM(S): 100 INJECTION INTRAMUSCULAR at 00:45

## 2017-10-09 RX ADMIN — HALOPERIDOL DECANOATE 5 MILLIGRAM(S): 100 INJECTION INTRAMUSCULAR at 11:15

## 2017-10-10 ENCOUNTER — EMERGENCY (EMERGENCY)
Facility: HOSPITAL | Age: 72
LOS: 1 days | Discharge: OTHER INSTITUTION | End: 2017-10-10
Attending: EMERGENCY MEDICINE | Admitting: EMERGENCY MEDICINE
Payer: MEDICARE

## 2017-10-10 VITALS
HEART RATE: 64 BPM | SYSTOLIC BLOOD PRESSURE: 121 MMHG | TEMPERATURE: 97 F | DIASTOLIC BLOOD PRESSURE: 59 MMHG | OXYGEN SATURATION: 99 % | RESPIRATION RATE: 16 BRPM

## 2017-10-10 VITALS
DIASTOLIC BLOOD PRESSURE: 60 MMHG | RESPIRATION RATE: 16 BRPM | SYSTOLIC BLOOD PRESSURE: 122 MMHG | OXYGEN SATURATION: 99 % | HEART RATE: 60 BPM

## 2017-10-10 DIAGNOSIS — Z90.710 ACQUIRED ABSENCE OF BOTH CERVIX AND UTERUS: Chronic | ICD-10-CM

## 2017-10-10 LAB
ALBUMIN SERPL ELPH-MCNC: 3.4 G/DL — SIGNIFICANT CHANGE UP (ref 3.3–5)
ALP SERPL-CCNC: 101 U/L — SIGNIFICANT CHANGE UP (ref 40–120)
ALT FLD-CCNC: 23 U/L — SIGNIFICANT CHANGE UP (ref 4–33)
AST SERPL-CCNC: 28 U/L — SIGNIFICANT CHANGE UP (ref 4–32)
BASOPHILS # BLD AUTO: 0.04 K/UL — SIGNIFICANT CHANGE UP (ref 0–0.2)
BASOPHILS NFR BLD AUTO: 0.4 % — SIGNIFICANT CHANGE UP (ref 0–2)
BILIRUB SERPL-MCNC: 0.6 MG/DL — SIGNIFICANT CHANGE UP (ref 0.2–1.2)
BUN SERPL-MCNC: 20 MG/DL — SIGNIFICANT CHANGE UP (ref 7–23)
CALCIUM SERPL-MCNC: 9.3 MG/DL — SIGNIFICANT CHANGE UP (ref 8.4–10.5)
CHLORIDE SERPL-SCNC: 95 MMOL/L — LOW (ref 98–107)
CO2 SERPL-SCNC: 26 MMOL/L — SIGNIFICANT CHANGE UP (ref 22–31)
CREAT SERPL-MCNC: 0.95 MG/DL — SIGNIFICANT CHANGE UP (ref 0.5–1.3)
EOSINOPHIL # BLD AUTO: 0.16 K/UL — SIGNIFICANT CHANGE UP (ref 0–0.5)
EOSINOPHIL NFR BLD AUTO: 1.6 % — SIGNIFICANT CHANGE UP (ref 0–6)
GLUCOSE SERPL-MCNC: 106 MG/DL — HIGH (ref 70–99)
HCT VFR BLD CALC: 43.4 % — SIGNIFICANT CHANGE UP (ref 34.5–45)
HGB BLD-MCNC: 14.7 G/DL — SIGNIFICANT CHANGE UP (ref 11.5–15.5)
IMM GRANULOCYTES # BLD AUTO: 0.04 # — SIGNIFICANT CHANGE UP
IMM GRANULOCYTES NFR BLD AUTO: 0.4 % — SIGNIFICANT CHANGE UP (ref 0–1.5)
LYMPHOCYTES # BLD AUTO: 3.61 K/UL — HIGH (ref 1–3.3)
LYMPHOCYTES # BLD AUTO: 36.7 % — SIGNIFICANT CHANGE UP (ref 13–44)
MCHC RBC-ENTMCNC: 31.2 PG — SIGNIFICANT CHANGE UP (ref 27–34)
MCHC RBC-ENTMCNC: 33.9 % — SIGNIFICANT CHANGE UP (ref 32–36)
MCV RBC AUTO: 92.1 FL — SIGNIFICANT CHANGE UP (ref 80–100)
MONOCYTES # BLD AUTO: 0.74 K/UL — SIGNIFICANT CHANGE UP (ref 0–0.9)
MONOCYTES NFR BLD AUTO: 7.5 % — SIGNIFICANT CHANGE UP (ref 2–14)
NEUTROPHILS # BLD AUTO: 5.25 K/UL — SIGNIFICANT CHANGE UP (ref 1.8–7.4)
NEUTROPHILS NFR BLD AUTO: 53.4 % — SIGNIFICANT CHANGE UP (ref 43–77)
NRBC # FLD: 0 — SIGNIFICANT CHANGE UP
PLATELET # BLD AUTO: 136 K/UL — LOW (ref 150–400)
PMV BLD: 13.2 FL — HIGH (ref 7–13)
POTASSIUM SERPL-MCNC: 3.3 MMOL/L — LOW (ref 3.5–5.3)
POTASSIUM SERPL-SCNC: 3.3 MMOL/L — LOW (ref 3.5–5.3)
PROT SERPL-MCNC: 6.5 G/DL — SIGNIFICANT CHANGE UP (ref 6–8.3)
RBC # BLD: 4.71 M/UL — SIGNIFICANT CHANGE UP (ref 3.8–5.2)
RBC # FLD: 13.4 % — SIGNIFICANT CHANGE UP (ref 10.3–14.5)
SODIUM SERPL-SCNC: 138 MMOL/L — SIGNIFICANT CHANGE UP (ref 135–145)
WBC # BLD: 9.84 K/UL — SIGNIFICANT CHANGE UP (ref 3.8–10.5)
WBC # FLD AUTO: 9.84 K/UL — SIGNIFICANT CHANGE UP (ref 3.8–10.5)

## 2017-10-10 PROCEDURE — 73110 X-RAY EXAM OF WRIST: CPT | Mod: 26,LT

## 2017-10-10 PROCEDURE — 99284 EMERGENCY DEPT VISIT MOD MDM: CPT

## 2017-10-10 PROCEDURE — 99232 SBSQ HOSP IP/OBS MODERATE 35: CPT

## 2017-10-10 PROCEDURE — 73100 X-RAY EXAM OF WRIST: CPT | Mod: 26,59,LT

## 2017-10-10 RX ORDER — CLONAZEPAM 1 MG
1 TABLET ORAL
Qty: 0 | Refills: 0 | Status: DISCONTINUED | OUTPATIENT
Start: 2017-10-10 | End: 2017-10-17

## 2017-10-10 RX ORDER — POTASSIUM CHLORIDE 20 MEQ
40 PACKET (EA) ORAL ONCE
Qty: 0 | Refills: 0 | Status: COMPLETED | OUTPATIENT
Start: 2017-10-10 | End: 2017-10-10

## 2017-10-10 RX ORDER — HALOPERIDOL DECANOATE 100 MG/ML
10 INJECTION INTRAMUSCULAR
Qty: 0 | Refills: 0 | Status: DISCONTINUED | OUTPATIENT
Start: 2017-10-10 | End: 2017-10-12

## 2017-10-10 RX ORDER — KETAMINE HYDROCHLORIDE 100 MG/ML
20 INJECTION INTRAMUSCULAR; INTRAVENOUS ONCE
Qty: 0 | Refills: 0 | Status: DISCONTINUED | OUTPATIENT
Start: 2017-10-10 | End: 2017-10-10

## 2017-10-10 RX ORDER — HALOPERIDOL DECANOATE 100 MG/ML
5 INJECTION INTRAMUSCULAR ONCE
Qty: 0 | Refills: 0 | Status: COMPLETED | OUTPATIENT
Start: 2017-10-10 | End: 2017-10-10

## 2017-10-10 RX ORDER — KETAMINE HYDROCHLORIDE 100 MG/ML
60 INJECTION INTRAMUSCULAR; INTRAVENOUS ONCE
Qty: 0 | Refills: 0 | Status: DISCONTINUED | OUTPATIENT
Start: 2017-10-10 | End: 2017-10-10

## 2017-10-10 RX ADMIN — OXYCODONE HYDROCHLORIDE 5 MILLIGRAM(S): 5 TABLET ORAL at 23:33

## 2017-10-10 RX ADMIN — Medication 0.5 MILLIGRAM(S): at 09:32

## 2017-10-10 RX ADMIN — Medication 2 MILLIGRAM(S): at 15:52

## 2017-10-10 RX ADMIN — HALOPERIDOL DECANOATE 5 MILLIGRAM(S): 100 INJECTION INTRAMUSCULAR at 12:34

## 2017-10-10 RX ADMIN — KETAMINE HYDROCHLORIDE 60 MILLIGRAM(S): 100 INJECTION INTRAMUSCULAR; INTRAVENOUS at 20:07

## 2017-10-10 RX ADMIN — KETAMINE HYDROCHLORIDE 20 MILLIGRAM(S): 100 INJECTION INTRAMUSCULAR; INTRAVENOUS at 20:17

## 2017-10-10 RX ADMIN — Medication 1 MILLIGRAM(S): at 23:31

## 2017-10-10 RX ADMIN — DIVALPROEX SODIUM 500 MILLIGRAM(S): 500 TABLET, DELAYED RELEASE ORAL at 23:32

## 2017-10-10 RX ADMIN — HALOPERIDOL DECANOATE 5 MILLIGRAM(S): 100 INJECTION INTRAMUSCULAR at 09:32

## 2017-10-10 RX ADMIN — OXYCODONE HYDROCHLORIDE 5 MILLIGRAM(S): 5 TABLET ORAL at 13:35

## 2017-10-10 RX ADMIN — OXYCODONE HYDROCHLORIDE 5 MILLIGRAM(S): 5 TABLET ORAL at 07:49

## 2017-10-10 RX ADMIN — OXYCODONE HYDROCHLORIDE 5 MILLIGRAM(S): 5 TABLET ORAL at 01:26

## 2017-10-10 RX ADMIN — OXYCODONE HYDROCHLORIDE 5 MILLIGRAM(S): 5 TABLET ORAL at 07:50

## 2017-10-10 RX ADMIN — KETAMINE HYDROCHLORIDE 20 MILLIGRAM(S): 100 INJECTION INTRAMUSCULAR; INTRAVENOUS at 20:18

## 2017-10-10 RX ADMIN — OXYCODONE HYDROCHLORIDE 5 MILLIGRAM(S): 5 TABLET ORAL at 12:31

## 2017-10-10 RX ADMIN — NYSTATIN CREAM 1 APPLICATION(S): 100000 CREAM TOPICAL at 09:33

## 2017-10-10 RX ADMIN — HALOPERIDOL DECANOATE 5 MILLIGRAM(S): 100 INJECTION INTRAMUSCULAR at 19:05

## 2017-10-10 RX ADMIN — HALOPERIDOL DECANOATE 5 MILLIGRAM(S): 100 INJECTION INTRAMUSCULAR at 01:26

## 2017-10-10 RX ADMIN — Medication 2 MILLIGRAM(S): at 12:31

## 2017-10-10 RX ADMIN — DIVALPROEX SODIUM 500 MILLIGRAM(S): 500 TABLET, DELAYED RELEASE ORAL at 09:32

## 2017-10-10 RX ADMIN — Medication 2 MILLIGRAM(S): at 19:05

## 2017-10-10 RX ADMIN — HALOPERIDOL DECANOATE 10 MILLIGRAM(S): 100 INJECTION INTRAMUSCULAR at 23:32

## 2017-10-10 NOTE — ED PROCEDURE NOTE - NS_POSTPROCCAREGUIDE_ED_ALL_ED
Patient is now fully awake, with vital signs and temperature stable, hydration is adequate, patients Adriana’s  score is at baseline (or greater than 8), patient and escort has received  discharge education.

## 2017-10-10 NOTE — CONSULT NOTE ADULT - ASSESSMENT
A: 72y yo Female with L DR Fischer s/p closed reduction, LAC    -- NWB LUE  -- DO NOT LET PT REMOVE CAST, KEEP IN RESTRAINTS  -- elevate, NSAIDs  -- FU with Dr. Marroquin in 3 weeks [phone]

## 2017-10-10 NOTE — CONSULT NOTE ADULT - SUBJECTIVE AND OBJECTIVE BOX
CC: cast removal    HPI: Pt is a 73 yo Female, schizophrenic with known L DR Fischer s/p multiple cast removals and recastings 4 days out from injury, tried to move cast, poured water down it.     PMH:  Hypertension  Schizophrenia  Hypercholesteremia  Hypertension    No significant past surgical history    Exam: 72y yo Female in NAD, confused, delirious  LUE: SILT M/U/R, motor intact AIN/PIN/M/U/R, hand WWP, skin intact.     Xrays: cast parially removed, loss of reduction dorsally of L DR Fischer    Procedure: With conscious sedation, a closed reduction was performed with acceptable post reduction alignment obtained and a long arm cast was applied.

## 2017-10-10 NOTE — CHART NOTE - NSCHARTNOTEFT_GEN_A_CORE
[facility] Inpatient to ED Transfer Summary    Reason for Transfer/Medical Summary: 72 year old female with h/o schizophrenia, +past psychiatric hospitalization. Pt is s/p left lower radial   fracture, s/p cast on, S/P ER transfer for self removal of cast. Pt now put some water inside the cast, pulled it slightly. Contacted orthopedic , recommended reevaluation. Pt is at risk for aggression, needs CO.    PAST MEDICAL & SURGICAL HISTORY:  Hypertension  Schizophrenia  Hypercholesteremia  Hypertension  Schizophrenia  No significant past surgical history  H/O: hysterectomy      Allergies    No Known Allergies    Intolerances        MEDICATIONS  (STANDING):  amLODIPine   Tablet 10 milliGRAM(s) Oral at bedtime  clonazePAM Tablet 0.5 milliGRAM(s) Oral two times a day  diVALproex  milliGRAM(s) Oral two times a day  haloperidol     Tablet 10 milliGRAM(s) Oral two times a day  LORazepam     Tablet 2 milliGRAM(s) Oral once  nystatin Powder 1 Application(s) Topical two times a day  simvastatin 10 milliGRAM(s) Oral at bedtime    MEDICATIONS  (PRN):  acetaminophen   Tablet 650 milliGRAM(s) Oral every 6 hours PRN mild-moderate pain  haloperidol     Tablet 5 milliGRAM(s) Oral every 6 hours PRN agitation  haloperidol    Injectable 5 milliGRAM(s) IntraMuscular once PRN WHEN PT REFUSES PO HALDOL AS PER COURT ORDER  haloperidol    Injectable 5 milliGRAM(s) IntraMuscular once PRN agitation  LORazepam   Injectable 2 milliGRAM(s) IntraMuscular once PRN Agitation  LORazepam   Injectable 1 milliGRAM(s) IntraMuscular once PRN when pt refuses po depakote as per court order  oxyCODONE    IR 5 milliGRAM(s) Oral every 4 hours PRN Severe Pain (7 - 10).      Vital Signs Last 24 Hrs  T(C): 36.8 (09 Oct 2017 11:51), Max: 36.8 (09 Oct 2017 11:51)  T(F): 98.2 (09 Oct 2017 11:51), Max: 98.2 (09 Oct 2017 11:51)  HR: 71 (09 Oct 2017 11:51) (71 - 71)  BP: 132/82 (09 Oct 2017 11:51) (132/82 - 132/82)  BP(mean): --  RR: --  SpO2: --  CAPILLARY BLOOD GLUCOSE            PHYSICAL EXAM:  GENERAL: NAD, well-developed  HEAD:  Atraumatic, Normocephalic  EYES: EOMI, PERRLA, conjunctiva and sclera clear  NECK: Supple, No JVD  CHEST/LUNG: Clear to auscultation bilaterally; No wheeze  HEART: Regular rate and rhythm; No murmurs, rubs, or gallops  ABDOMEN: Soft, Nontender, Nondistended; Bowel sounds present  EXTREMITIES:  2+ Peripheral Pulses, No clubbing, cyanosis, or edema  PSYCH: AAOx3  NEUROLOGY: non-focal  SKIN: No rashes or lesions    LABS:                    Psychiatry Section:  Psychiatric Summary/[facility] admitting diagnosis:    Psychiatric Recommendations:    Observation status (check one):   ( ) Constant Observation  ( ) Enhanced care  ( ) Routine checks    Risk Status (check all that apply if present):  ( ) at risk for suicide/self-injury  ( ) at risk for aggressive behavior  ( ) at risk for elopement  ( ) other risk:

## 2017-10-10 NOTE — ED ADULT TRIAGE NOTE - CHIEF COMPLAINT QUOTE
Pt benito as a transfer from [facility], pt pulled off  multiple casts that were placed on left arm after a fx.  pt has a full arm cast that she put water on, shifting the cast. pt sent over for recasting. [facility] staff with pt. cap refill less than 2 seconds to affected extremity

## 2017-10-10 NOTE — ED PROVIDER NOTE - PROGRESS NOTE DETAILS
Jose: s/o Dr. Saucedo, pt from [facility] s/p fracture reduction under PSA. signed out pending awake. pt sleepy but arrousable and answers questions fully. safe for transfer back to [facility].

## 2017-10-10 NOTE — ED ADULT NURSE NOTE - CHIEF COMPLAINT QUOTE
Pt benito as a transfer from [facility], pt pulled off  multiple casts that were placed on left arm after a fx.  pt has a full arm cast that she put water on, shifting the cast. pt sent over for recasting. [facility] staff with pt. cap refill less than 2 seconds to affected extremity

## 2017-10-10 NOTE — ED PROVIDER NOTE - OBJECTIVE STATEMENT
71 y/o F with h/o depression sent from [facility] after she wets her left arm cast and pulls it off,, pt is actively suciidal and is verbalizing and asking for means to end her life. Pt denies any other complaints and had left colles fx before. She had simialr episode last week and has her cast changed

## 2017-10-10 NOTE — ED PROVIDER NOTE - MUSCULOSKELETAL, MLM
Spine appears normal, range of motion is not limited, no muscle or joint tenderness. Left arm I cast with good circulation btu cast is cast and aprtially off

## 2017-10-10 NOTE — ED PROVIDER NOTE - CARE PLAN
Principal Discharge DX:	Closed Colles' fracture of left radius with delayed healing, subsequent encounter

## 2017-10-11 PROCEDURE — 99232 SBSQ HOSP IP/OBS MODERATE 35: CPT

## 2017-10-11 RX ADMIN — HALOPERIDOL DECANOATE 10 MILLIGRAM(S): 100 INJECTION INTRAMUSCULAR at 21:20

## 2017-10-11 RX ADMIN — AMLODIPINE BESYLATE 10 MILLIGRAM(S): 2.5 TABLET ORAL at 21:20

## 2017-10-11 RX ADMIN — OXYCODONE HYDROCHLORIDE 5 MILLIGRAM(S): 5 TABLET ORAL at 01:40

## 2017-10-11 RX ADMIN — NYSTATIN CREAM 1 APPLICATION(S): 100000 CREAM TOPICAL at 11:16

## 2017-10-11 RX ADMIN — Medication 1 MILLIGRAM(S): at 11:16

## 2017-10-11 RX ADMIN — DIVALPROEX SODIUM 500 MILLIGRAM(S): 500 TABLET, DELAYED RELEASE ORAL at 11:16

## 2017-10-11 RX ADMIN — Medication 1 MILLIGRAM(S): at 21:20

## 2017-10-11 RX ADMIN — DIVALPROEX SODIUM 500 MILLIGRAM(S): 500 TABLET, DELAYED RELEASE ORAL at 21:20

## 2017-10-11 RX ADMIN — NYSTATIN CREAM 1 APPLICATION(S): 100000 CREAM TOPICAL at 21:20

## 2017-10-11 RX ADMIN — HALOPERIDOL DECANOATE 10 MILLIGRAM(S): 100 INJECTION INTRAMUSCULAR at 11:16

## 2017-10-11 RX ADMIN — SIMVASTATIN 10 MILLIGRAM(S): 20 TABLET, FILM COATED ORAL at 21:20

## 2017-10-12 DIAGNOSIS — E87.6 HYPOKALEMIA: ICD-10-CM

## 2017-10-12 DIAGNOSIS — F23 BRIEF PSYCHOTIC DISORDER: ICD-10-CM

## 2017-10-12 DIAGNOSIS — S52.90XA UNSPECIFIED FRACTURE OF UNSPECIFIED FOREARM, INITIAL ENCOUNTER FOR CLOSED FRACTURE: ICD-10-CM

## 2017-10-12 DIAGNOSIS — T14.8XXA OTHER INJURY OF UNSPECIFIED BODY REGION, INITIAL ENCOUNTER: ICD-10-CM

## 2017-10-12 DIAGNOSIS — I10 ESSENTIAL (PRIMARY) HYPERTENSION: ICD-10-CM

## 2017-10-12 LAB
ALBUMIN SERPL ELPH-MCNC: 3.1 G/DL — LOW (ref 3.3–5)
ALP SERPL-CCNC: 97 U/L — SIGNIFICANT CHANGE UP (ref 40–120)
ALT FLD-CCNC: 21 U/L — SIGNIFICANT CHANGE UP (ref 4–33)
AST SERPL-CCNC: 30 U/L — SIGNIFICANT CHANGE UP (ref 4–32)
BASOPHILS # BLD AUTO: 0.02 K/UL — SIGNIFICANT CHANGE UP (ref 0–0.2)
BASOPHILS NFR BLD AUTO: 0.2 % — SIGNIFICANT CHANGE UP (ref 0–2)
BILIRUB SERPL-MCNC: 0.7 MG/DL — SIGNIFICANT CHANGE UP (ref 0.2–1.2)
BUN SERPL-MCNC: 6 MG/DL — LOW (ref 7–23)
CALCIUM SERPL-MCNC: 9 MG/DL — SIGNIFICANT CHANGE UP (ref 8.4–10.5)
CHLORIDE SERPL-SCNC: 101 MMOL/L — SIGNIFICANT CHANGE UP (ref 98–107)
CO2 SERPL-SCNC: 25 MMOL/L — SIGNIFICANT CHANGE UP (ref 22–31)
CREAT SERPL-MCNC: 0.68 MG/DL — SIGNIFICANT CHANGE UP (ref 0.5–1.3)
EOSINOPHIL # BLD AUTO: 0.13 K/UL — SIGNIFICANT CHANGE UP (ref 0–0.5)
EOSINOPHIL NFR BLD AUTO: 1.5 % — SIGNIFICANT CHANGE UP (ref 0–6)
GLUCOSE SERPL-MCNC: 82 MG/DL — SIGNIFICANT CHANGE UP (ref 70–99)
HCT VFR BLD CALC: 40.7 % — SIGNIFICANT CHANGE UP (ref 34.5–45)
HGB BLD-MCNC: 13.7 G/DL — SIGNIFICANT CHANGE UP (ref 11.5–15.5)
IMM GRANULOCYTES # BLD AUTO: 0.04 # — SIGNIFICANT CHANGE UP
IMM GRANULOCYTES NFR BLD AUTO: 0.5 % — SIGNIFICANT CHANGE UP (ref 0–1.5)
LYMPHOCYTES # BLD AUTO: 2.22 K/UL — SIGNIFICANT CHANGE UP (ref 1–3.3)
LYMPHOCYTES # BLD AUTO: 25.9 % — SIGNIFICANT CHANGE UP (ref 13–44)
MCHC RBC-ENTMCNC: 31.1 PG — SIGNIFICANT CHANGE UP (ref 27–34)
MCHC RBC-ENTMCNC: 33.7 % — SIGNIFICANT CHANGE UP (ref 32–36)
MCV RBC AUTO: 92.3 FL — SIGNIFICANT CHANGE UP (ref 80–100)
MONOCYTES # BLD AUTO: 0.63 K/UL — SIGNIFICANT CHANGE UP (ref 0–0.9)
MONOCYTES NFR BLD AUTO: 7.4 % — SIGNIFICANT CHANGE UP (ref 2–14)
NEUTROPHILS # BLD AUTO: 5.53 K/UL — SIGNIFICANT CHANGE UP (ref 1.8–7.4)
NEUTROPHILS NFR BLD AUTO: 64.5 % — SIGNIFICANT CHANGE UP (ref 43–77)
NRBC # FLD: 0 — SIGNIFICANT CHANGE UP
PLATELET # BLD AUTO: 151 K/UL — SIGNIFICANT CHANGE UP (ref 150–400)
PMV BLD: 12.4 FL — SIGNIFICANT CHANGE UP (ref 7–13)
POTASSIUM SERPL-MCNC: 3.4 MMOL/L — LOW (ref 3.5–5.3)
POTASSIUM SERPL-SCNC: 3.4 MMOL/L — LOW (ref 3.5–5.3)
PROT SERPL-MCNC: 6.3 G/DL — SIGNIFICANT CHANGE UP (ref 6–8.3)
RBC # BLD: 4.41 M/UL — SIGNIFICANT CHANGE UP (ref 3.8–5.2)
RBC # FLD: 13.9 % — SIGNIFICANT CHANGE UP (ref 10.3–14.5)
SODIUM SERPL-SCNC: 143 MMOL/L — SIGNIFICANT CHANGE UP (ref 135–145)
WBC # BLD: 8.57 K/UL — SIGNIFICANT CHANGE UP (ref 3.8–10.5)
WBC # FLD AUTO: 8.57 K/UL — SIGNIFICANT CHANGE UP (ref 3.8–10.5)

## 2017-10-12 PROCEDURE — 99233 SBSQ HOSP IP/OBS HIGH 50: CPT

## 2017-10-12 PROCEDURE — 99232 SBSQ HOSP IP/OBS MODERATE 35: CPT

## 2017-10-12 RX ORDER — HALOPERIDOL DECANOATE 100 MG/ML
15 INJECTION INTRAMUSCULAR AT BEDTIME
Qty: 0 | Refills: 0 | Status: DISCONTINUED | OUTPATIENT
Start: 2017-10-12 | End: 2017-10-23

## 2017-10-12 RX ORDER — DIVALPROEX SODIUM 500 MG/1
500 TABLET, DELAYED RELEASE ORAL
Qty: 0 | Refills: 0 | Status: DISCONTINUED | OUTPATIENT
Start: 2017-10-12 | End: 2017-10-12

## 2017-10-12 RX ORDER — DIVALPROEX SODIUM 500 MG/1
1000 TABLET, DELAYED RELEASE ORAL AT BEDTIME
Qty: 0 | Refills: 0 | Status: DISCONTINUED | OUTPATIENT
Start: 2017-10-12 | End: 2017-10-13

## 2017-10-12 RX ORDER — HALOPERIDOL DECANOATE 100 MG/ML
5 INJECTION INTRAMUSCULAR ONCE
Qty: 0 | Refills: 0 | Status: COMPLETED | OUTPATIENT
Start: 2017-10-12 | End: 2017-10-12

## 2017-10-12 RX ORDER — HALOPERIDOL DECANOATE 100 MG/ML
5 INJECTION INTRAMUSCULAR DAILY
Qty: 0 | Refills: 0 | Status: DISCONTINUED | OUTPATIENT
Start: 2017-10-12 | End: 2017-10-13

## 2017-10-12 RX ADMIN — Medication 2 MILLIGRAM(S): at 14:20

## 2017-10-12 RX ADMIN — OXYCODONE HYDROCHLORIDE 5 MILLIGRAM(S): 5 TABLET ORAL at 07:39

## 2017-10-12 RX ADMIN — OXYCODONE HYDROCHLORIDE 5 MILLIGRAM(S): 5 TABLET ORAL at 22:20

## 2017-10-12 RX ADMIN — HALOPERIDOL DECANOATE 15 MILLIGRAM(S): 100 INJECTION INTRAMUSCULAR at 21:30

## 2017-10-12 RX ADMIN — HALOPERIDOL DECANOATE 5 MILLIGRAM(S): 100 INJECTION INTRAMUSCULAR at 10:25

## 2017-10-12 RX ADMIN — Medication 2 MILLIGRAM(S): at 10:25

## 2017-10-12 RX ADMIN — OXYCODONE HYDROCHLORIDE 5 MILLIGRAM(S): 5 TABLET ORAL at 05:37

## 2017-10-12 RX ADMIN — Medication 1 MILLIGRAM(S): at 21:46

## 2017-10-12 RX ADMIN — OXYCODONE HYDROCHLORIDE 5 MILLIGRAM(S): 5 TABLET ORAL at 17:37

## 2017-10-12 RX ADMIN — DIVALPROEX SODIUM 1000 MILLIGRAM(S): 500 TABLET, DELAYED RELEASE ORAL at 21:46

## 2017-10-12 RX ADMIN — AMLODIPINE BESYLATE 10 MILLIGRAM(S): 2.5 TABLET ORAL at 21:46

## 2017-10-12 RX ADMIN — OXYCODONE HYDROCHLORIDE 5 MILLIGRAM(S): 5 TABLET ORAL at 21:47

## 2017-10-12 RX ADMIN — OXYCODONE HYDROCHLORIDE 5 MILLIGRAM(S): 5 TABLET ORAL at 16:05

## 2017-10-12 RX ADMIN — HALOPERIDOL DECANOATE 5 MILLIGRAM(S): 100 INJECTION INTRAMUSCULAR at 14:20

## 2017-10-12 RX ADMIN — NYSTATIN CREAM 1 APPLICATION(S): 100000 CREAM TOPICAL at 21:30

## 2017-10-12 RX ADMIN — SIMVASTATIN 10 MILLIGRAM(S): 20 TABLET, FILM COATED ORAL at 21:30

## 2017-10-12 NOTE — PROGRESS NOTE ADULT - PROBLEM SELECTOR PLAN 1
-h/o schizophrenia   -remains extremely agitated and psychotic   -c/w management per psych team  -Unable to care for cast as patient remains uncooperative with treatment  -If unable to manage agitation/psychosis with PO/IM route, IV route may need to be considered.

## 2017-10-12 NOTE — PROGRESS NOTE ADULT - PROBLEM SELECTOR PLAN 2
-Multiple ED visits due to damage to cast  -c/w constant observation, avoid patient from removing cast if possible  -NWB LUE  -pain control

## 2017-10-12 NOTE — CONSULT NOTE ADULT - SUBJECTIVE AND OBJECTIVE BOX
Called to unit for Rapid Medical Response for HTN for this 72 year old female with psych disorder now with exacerbation with a significant hx during this admission of aggression, combative behavior, noncomplaint with blood pressure pills.  On admission, pt with a blood pressure 09/13/2017 of 220/100 and pt sent to ED for treatment.  Pt on Amlodipine 5 mg hs but takes intermittently.  Again on 09/21/2017 pt with a blood pressure of 213/120 asymptomatic but agitated and sent to ED.  In Ed pt treated with 1 mg Ativan IM with normalization of blood pressure.    Pt went to court on 10/03/2017 for meds over objection. Pt had been refusing all medications except occasionally blood pressure Amlodipine 5 mg at night randomly.  Court ruled for medications over objection.    Now rapid medical response called for blood pressure of 194/142.  Pt asymptomatic denies headaches or blurred vision.  Pt very agitated and upset re court ordered dose of IM Prolixin she just received.  Pt screaming running around room chasing staff.  Pt flinging  bilateral arms in air with force and energy. Pt trying to kick staff.  Pt slamming door shut.    Pt treated in ED for hypertension and agitation with 1 mg of Ativan on 09/21/2017 with good results.  Pt given 1 mg Ativan IM on unit.  Blood pressure after one hours 159/117, after two and a half hours 150/125/  (evening blood pressure normalized 144/87 80)    PE:  AGITATED COMBATIVE 72 YEAR OLD FEMALE SCREAMING,   SKIN LEFT FORE ARM 5 CM2 BRUISE.  PT SWINGING BILATERAL ARMS WITH FROM FORCEFULLY.  LEFT HIP FOUR SMALL 1.5 CM2 ECCHYMOTIC AREAS.  PT KICKING WITH BOTH LOWER EXTREMITIES WITH FROM OF LEFT HIP.    1.  HTN:  PT ASYMPTOMATIC.  BP IMPROVED WITH ATIVAN IM.  ENCOURAGE PT TO TAKE HER PM BLOOD PRESSURE MEDICATIONS.  2.  LEFT FORE ARM BRUISE.  PT MOVING LUE WITH FROM OF ALL JOINTS.  PT WOULD NOT ALLOW FURTHER EXAMINATION.  3.  LEFT HIP FOUR SMALL BRUISES.  PT KICKING WITH LLE WITH FROM OF LEFT HIP.  NO FURTHER WORK UP AT HIS TIME.  4.  PSYCH: AS PER ATTENDING.

## 2017-10-12 NOTE — PROGRESS NOTE ADULT - PROBLEM SELECTOR PLAN 3
-RUE bruising noted  -No hematoma palpated on exam  -Patient not cooperative with exam however moving RUE without difficulties. No surrounding edema or erythema. Hence concern for fracture is low. Monitor clinically. If edema, erythema and decreased ROM noted, recommend xray of RUE.

## 2017-10-13 PROCEDURE — 99232 SBSQ HOSP IP/OBS MODERATE 35: CPT

## 2017-10-13 RX ORDER — HALOPERIDOL DECANOATE 100 MG/ML
10 INJECTION INTRAMUSCULAR DAILY
Qty: 0 | Refills: 0 | Status: DISCONTINUED | OUTPATIENT
Start: 2017-10-13 | End: 2017-12-07

## 2017-10-13 RX ORDER — HALOPERIDOL DECANOATE 100 MG/ML
5 INJECTION INTRAMUSCULAR ONCE
Qty: 0 | Refills: 0 | Status: COMPLETED | OUTPATIENT
Start: 2017-10-13 | End: 2017-10-13

## 2017-10-13 RX ORDER — DIVALPROEX SODIUM 500 MG/1
1500 TABLET, DELAYED RELEASE ORAL AT BEDTIME
Qty: 0 | Refills: 0 | Status: DISCONTINUED | OUTPATIENT
Start: 2017-10-13 | End: 2017-10-16

## 2017-10-13 RX ORDER — OXYCODONE HYDROCHLORIDE 5 MG/1
5 TABLET ORAL EVERY 4 HOURS
Qty: 0 | Refills: 0 | Status: DISCONTINUED | OUTPATIENT
Start: 2017-10-13 | End: 2017-10-17

## 2017-10-13 RX ADMIN — Medication 1 MILLIGRAM(S): at 21:15

## 2017-10-13 RX ADMIN — OXYCODONE HYDROCHLORIDE 5 MILLIGRAM(S): 5 TABLET ORAL at 23:17

## 2017-10-13 RX ADMIN — OXYCODONE HYDROCHLORIDE 5 MILLIGRAM(S): 5 TABLET ORAL at 16:07

## 2017-10-13 RX ADMIN — OXYCODONE HYDROCHLORIDE 5 MILLIGRAM(S): 5 TABLET ORAL at 17:11

## 2017-10-13 RX ADMIN — AMLODIPINE BESYLATE 10 MILLIGRAM(S): 2.5 TABLET ORAL at 21:15

## 2017-10-13 RX ADMIN — DIVALPROEX SODIUM 1500 MILLIGRAM(S): 500 TABLET, DELAYED RELEASE ORAL at 21:15

## 2017-10-13 RX ADMIN — HALOPERIDOL DECANOATE 5 MILLIGRAM(S): 100 INJECTION INTRAMUSCULAR at 13:30

## 2017-10-13 RX ADMIN — HALOPERIDOL DECANOATE 5 MILLIGRAM(S): 100 INJECTION INTRAMUSCULAR at 10:26

## 2017-10-13 RX ADMIN — SIMVASTATIN 10 MILLIGRAM(S): 20 TABLET, FILM COATED ORAL at 21:15

## 2017-10-13 RX ADMIN — OXYCODONE HYDROCHLORIDE 5 MILLIGRAM(S): 5 TABLET ORAL at 04:20

## 2017-10-13 RX ADMIN — HALOPERIDOL DECANOATE 15 MILLIGRAM(S): 100 INJECTION INTRAMUSCULAR at 21:15

## 2017-10-13 RX ADMIN — NYSTATIN CREAM 1 APPLICATION(S): 100000 CREAM TOPICAL at 15:55

## 2017-10-13 RX ADMIN — NYSTATIN CREAM 1 APPLICATION(S): 100000 CREAM TOPICAL at 21:15

## 2017-10-13 RX ADMIN — OXYCODONE HYDROCHLORIDE 5 MILLIGRAM(S): 5 TABLET ORAL at 22:17

## 2017-10-13 RX ADMIN — Medication 1 MILLIGRAM(S): at 10:26

## 2017-10-13 RX ADMIN — OXYCODONE HYDROCHLORIDE 5 MILLIGRAM(S): 5 TABLET ORAL at 05:24

## 2017-10-14 PROCEDURE — 99231 SBSQ HOSP IP/OBS SF/LOW 25: CPT

## 2017-10-14 RX ADMIN — OXYCODONE HYDROCHLORIDE 5 MILLIGRAM(S): 5 TABLET ORAL at 23:00

## 2017-10-14 RX ADMIN — HALOPERIDOL DECANOATE 10 MILLIGRAM(S): 100 INJECTION INTRAMUSCULAR at 09:51

## 2017-10-14 RX ADMIN — AMLODIPINE BESYLATE 10 MILLIGRAM(S): 2.5 TABLET ORAL at 21:08

## 2017-10-14 RX ADMIN — OXYCODONE HYDROCHLORIDE 5 MILLIGRAM(S): 5 TABLET ORAL at 14:30

## 2017-10-14 RX ADMIN — Medication 1 MILLIGRAM(S): at 09:51

## 2017-10-14 RX ADMIN — Medication 650 MILLIGRAM(S): at 11:30

## 2017-10-14 RX ADMIN — Medication 1 MILLIGRAM(S): at 21:09

## 2017-10-14 RX ADMIN — SIMVASTATIN 10 MILLIGRAM(S): 20 TABLET, FILM COATED ORAL at 21:09

## 2017-10-14 RX ADMIN — OXYCODONE HYDROCHLORIDE 5 MILLIGRAM(S): 5 TABLET ORAL at 09:30

## 2017-10-14 RX ADMIN — OXYCODONE HYDROCHLORIDE 5 MILLIGRAM(S): 5 TABLET ORAL at 08:30

## 2017-10-14 RX ADMIN — OXYCODONE HYDROCHLORIDE 5 MILLIGRAM(S): 5 TABLET ORAL at 21:38

## 2017-10-14 RX ADMIN — OXYCODONE HYDROCHLORIDE 5 MILLIGRAM(S): 5 TABLET ORAL at 13:55

## 2017-10-14 RX ADMIN — NYSTATIN CREAM 1 APPLICATION(S): 100000 CREAM TOPICAL at 09:51

## 2017-10-14 RX ADMIN — DIVALPROEX SODIUM 1500 MILLIGRAM(S): 500 TABLET, DELAYED RELEASE ORAL at 21:09

## 2017-10-14 RX ADMIN — HALOPERIDOL DECANOATE 15 MILLIGRAM(S): 100 INJECTION INTRAMUSCULAR at 21:09

## 2017-10-15 PROCEDURE — 99231 SBSQ HOSP IP/OBS SF/LOW 25: CPT

## 2017-10-15 RX ADMIN — OXYCODONE HYDROCHLORIDE 5 MILLIGRAM(S): 5 TABLET ORAL at 12:41

## 2017-10-15 RX ADMIN — OXYCODONE HYDROCHLORIDE 5 MILLIGRAM(S): 5 TABLET ORAL at 06:52

## 2017-10-15 RX ADMIN — Medication 1 MILLIGRAM(S): at 12:03

## 2017-10-15 RX ADMIN — HALOPERIDOL DECANOATE 15 MILLIGRAM(S): 100 INJECTION INTRAMUSCULAR at 20:11

## 2017-10-15 RX ADMIN — AMLODIPINE BESYLATE 10 MILLIGRAM(S): 2.5 TABLET ORAL at 20:57

## 2017-10-15 RX ADMIN — OXYCODONE HYDROCHLORIDE 5 MILLIGRAM(S): 5 TABLET ORAL at 16:29

## 2017-10-15 RX ADMIN — OXYCODONE HYDROCHLORIDE 5 MILLIGRAM(S): 5 TABLET ORAL at 12:03

## 2017-10-15 RX ADMIN — SIMVASTATIN 10 MILLIGRAM(S): 20 TABLET, FILM COATED ORAL at 20:11

## 2017-10-15 RX ADMIN — NYSTATIN CREAM 1 APPLICATION(S): 100000 CREAM TOPICAL at 12:03

## 2017-10-15 RX ADMIN — OXYCODONE HYDROCHLORIDE 5 MILLIGRAM(S): 5 TABLET ORAL at 05:15

## 2017-10-15 RX ADMIN — NYSTATIN CREAM 1 APPLICATION(S): 100000 CREAM TOPICAL at 21:41

## 2017-10-15 RX ADMIN — OXYCODONE HYDROCHLORIDE 5 MILLIGRAM(S): 5 TABLET ORAL at 16:30

## 2017-10-15 RX ADMIN — OXYCODONE HYDROCHLORIDE 5 MILLIGRAM(S): 5 TABLET ORAL at 23:37

## 2017-10-15 RX ADMIN — HALOPERIDOL DECANOATE 10 MILLIGRAM(S): 100 INJECTION INTRAMUSCULAR at 12:03

## 2017-10-15 RX ADMIN — Medication 1 MILLIGRAM(S): at 20:10

## 2017-10-15 RX ADMIN — DIVALPROEX SODIUM 1500 MILLIGRAM(S): 500 TABLET, DELAYED RELEASE ORAL at 20:11

## 2017-10-16 LAB
ALBUMIN SERPL ELPH-MCNC: 2.8 G/DL — LOW (ref 3.3–5)
ALP SERPL-CCNC: 88 U/L — SIGNIFICANT CHANGE UP (ref 40–120)
ALT FLD-CCNC: 13 U/L — SIGNIFICANT CHANGE UP (ref 4–33)
AST SERPL-CCNC: 14 U/L — SIGNIFICANT CHANGE UP (ref 4–32)
BASOPHILS # BLD AUTO: 0.01 K/UL — SIGNIFICANT CHANGE UP (ref 0–0.2)
BASOPHILS NFR BLD AUTO: 0.2 % — SIGNIFICANT CHANGE UP (ref 0–2)
BILIRUB SERPL-MCNC: 0.3 MG/DL — SIGNIFICANT CHANGE UP (ref 0.2–1.2)
BUN SERPL-MCNC: 8 MG/DL — SIGNIFICANT CHANGE UP (ref 7–23)
CALCIUM SERPL-MCNC: 8.5 MG/DL — SIGNIFICANT CHANGE UP (ref 8.4–10.5)
CHLORIDE SERPL-SCNC: 101 MMOL/L — SIGNIFICANT CHANGE UP (ref 98–107)
CO2 SERPL-SCNC: 31 MMOL/L — SIGNIFICANT CHANGE UP (ref 22–31)
CREAT SERPL-MCNC: 0.75 MG/DL — SIGNIFICANT CHANGE UP (ref 0.5–1.3)
EOSINOPHIL # BLD AUTO: 0.11 K/UL — SIGNIFICANT CHANGE UP (ref 0–0.5)
EOSINOPHIL NFR BLD AUTO: 2.1 % — SIGNIFICANT CHANGE UP (ref 0–6)
GLUCOSE SERPL-MCNC: 102 MG/DL — HIGH (ref 70–99)
HCT VFR BLD CALC: 38.9 % — SIGNIFICANT CHANGE UP (ref 34.5–45)
HGB BLD-MCNC: 12.6 G/DL — SIGNIFICANT CHANGE UP (ref 11.5–15.5)
IMM GRANULOCYTES # BLD AUTO: 0.02 # — SIGNIFICANT CHANGE UP
IMM GRANULOCYTES NFR BLD AUTO: 0.4 % — SIGNIFICANT CHANGE UP (ref 0–1.5)
LYMPHOCYTES # BLD AUTO: 1.87 K/UL — SIGNIFICANT CHANGE UP (ref 1–3.3)
LYMPHOCYTES # BLD AUTO: 36.5 % — SIGNIFICANT CHANGE UP (ref 13–44)
MCHC RBC-ENTMCNC: 31.1 PG — SIGNIFICANT CHANGE UP (ref 27–34)
MCHC RBC-ENTMCNC: 32.4 % — SIGNIFICANT CHANGE UP (ref 32–36)
MCV RBC AUTO: 96 FL — SIGNIFICANT CHANGE UP (ref 80–100)
MONOCYTES # BLD AUTO: 0.45 K/UL — SIGNIFICANT CHANGE UP (ref 0–0.9)
MONOCYTES NFR BLD AUTO: 8.8 % — SIGNIFICANT CHANGE UP (ref 2–14)
NEUTROPHILS # BLD AUTO: 2.67 K/UL — SIGNIFICANT CHANGE UP (ref 1.8–7.4)
NEUTROPHILS NFR BLD AUTO: 52 % — SIGNIFICANT CHANGE UP (ref 43–77)
NRBC # FLD: 0 — SIGNIFICANT CHANGE UP
PLATELET # BLD AUTO: 176 K/UL — SIGNIFICANT CHANGE UP (ref 150–400)
PMV BLD: 11.8 FL — SIGNIFICANT CHANGE UP (ref 7–13)
POTASSIUM SERPL-MCNC: 3.6 MMOL/L — SIGNIFICANT CHANGE UP (ref 3.5–5.3)
POTASSIUM SERPL-SCNC: 3.6 MMOL/L — SIGNIFICANT CHANGE UP (ref 3.5–5.3)
PROT SERPL-MCNC: 5.5 G/DL — LOW (ref 6–8.3)
RBC # BLD: 4.05 M/UL — SIGNIFICANT CHANGE UP (ref 3.8–5.2)
RBC # FLD: 14.1 % — SIGNIFICANT CHANGE UP (ref 10.3–14.5)
SODIUM SERPL-SCNC: 142 MMOL/L — SIGNIFICANT CHANGE UP (ref 135–145)
VALPROATE SERPL-MCNC: 104.7 UG/ML — HIGH (ref 50–100)
WBC # BLD: 5.13 K/UL — SIGNIFICANT CHANGE UP (ref 3.8–10.5)
WBC # FLD AUTO: 5.13 K/UL — SIGNIFICANT CHANGE UP (ref 3.8–10.5)

## 2017-10-16 PROCEDURE — 99232 SBSQ HOSP IP/OBS MODERATE 35: CPT

## 2017-10-16 RX ORDER — DIVALPROEX SODIUM 500 MG/1
1250 TABLET, DELAYED RELEASE ORAL AT BEDTIME
Qty: 0 | Refills: 0 | Status: DISCONTINUED | OUTPATIENT
Start: 2017-10-16 | End: 2017-12-19

## 2017-10-16 RX ADMIN — Medication 650 MILLIGRAM(S): at 09:05

## 2017-10-16 RX ADMIN — OXYCODONE HYDROCHLORIDE 5 MILLIGRAM(S): 5 TABLET ORAL at 01:00

## 2017-10-16 RX ADMIN — OXYCODONE HYDROCHLORIDE 5 MILLIGRAM(S): 5 TABLET ORAL at 17:50

## 2017-10-16 RX ADMIN — OXYCODONE HYDROCHLORIDE 5 MILLIGRAM(S): 5 TABLET ORAL at 06:44

## 2017-10-16 RX ADMIN — DIVALPROEX SODIUM 1250 MILLIGRAM(S): 500 TABLET, DELAYED RELEASE ORAL at 21:27

## 2017-10-16 RX ADMIN — SIMVASTATIN 10 MILLIGRAM(S): 20 TABLET, FILM COATED ORAL at 21:27

## 2017-10-16 RX ADMIN — HALOPERIDOL DECANOATE 10 MILLIGRAM(S): 100 INJECTION INTRAMUSCULAR at 09:06

## 2017-10-16 RX ADMIN — OXYCODONE HYDROCHLORIDE 5 MILLIGRAM(S): 5 TABLET ORAL at 17:00

## 2017-10-16 RX ADMIN — Medication 1 MILLIGRAM(S): at 21:27

## 2017-10-16 RX ADMIN — OXYCODONE HYDROCHLORIDE 5 MILLIGRAM(S): 5 TABLET ORAL at 07:45

## 2017-10-16 RX ADMIN — HALOPERIDOL DECANOATE 15 MILLIGRAM(S): 100 INJECTION INTRAMUSCULAR at 21:27

## 2017-10-16 RX ADMIN — Medication 1 MILLIGRAM(S): at 09:06

## 2017-10-17 PROCEDURE — 99232 SBSQ HOSP IP/OBS MODERATE 35: CPT

## 2017-10-17 RX ORDER — CLONAZEPAM 1 MG
1 TABLET ORAL
Qty: 0 | Refills: 0 | Status: DISCONTINUED | OUTPATIENT
Start: 2017-10-17 | End: 2017-10-24

## 2017-10-17 RX ORDER — OXYCODONE HYDROCHLORIDE 5 MG/1
5 TABLET ORAL EVERY 4 HOURS
Qty: 0 | Refills: 0 | Status: DISCONTINUED | OUTPATIENT
Start: 2017-10-17 | End: 2017-10-20

## 2017-10-17 RX ADMIN — SIMVASTATIN 10 MILLIGRAM(S): 20 TABLET, FILM COATED ORAL at 21:22

## 2017-10-17 RX ADMIN — Medication 650 MILLIGRAM(S): at 04:00

## 2017-10-17 RX ADMIN — HALOPERIDOL DECANOATE 15 MILLIGRAM(S): 100 INJECTION INTRAMUSCULAR at 21:21

## 2017-10-17 RX ADMIN — OXYCODONE HYDROCHLORIDE 5 MILLIGRAM(S): 5 TABLET ORAL at 12:20

## 2017-10-17 RX ADMIN — OXYCODONE HYDROCHLORIDE 5 MILLIGRAM(S): 5 TABLET ORAL at 02:08

## 2017-10-17 RX ADMIN — NYSTATIN CREAM 1 APPLICATION(S): 100000 CREAM TOPICAL at 08:37

## 2017-10-17 RX ADMIN — OXYCODONE HYDROCHLORIDE 5 MILLIGRAM(S): 5 TABLET ORAL at 13:22

## 2017-10-17 RX ADMIN — OXYCODONE HYDROCHLORIDE 5 MILLIGRAM(S): 5 TABLET ORAL at 03:47

## 2017-10-17 RX ADMIN — AMLODIPINE BESYLATE 10 MILLIGRAM(S): 2.5 TABLET ORAL at 21:21

## 2017-10-17 RX ADMIN — DIVALPROEX SODIUM 1250 MILLIGRAM(S): 500 TABLET, DELAYED RELEASE ORAL at 21:21

## 2017-10-17 RX ADMIN — HALOPERIDOL DECANOATE 10 MILLIGRAM(S): 100 INJECTION INTRAMUSCULAR at 08:36

## 2017-10-17 RX ADMIN — Medication 1 MILLIGRAM(S): at 08:36

## 2017-10-17 RX ADMIN — Medication 1 MILLIGRAM(S): at 21:21

## 2017-10-18 ENCOUNTER — APPOINTMENT (OUTPATIENT)
Dept: ORTHOPEDIC SURGERY | Facility: HOSPITAL | Age: 72
End: 2017-10-18

## 2017-10-18 ENCOUNTER — APPOINTMENT (OUTPATIENT)
Dept: RADIOLOGY | Facility: HOSPITAL | Age: 72
End: 2017-10-18

## 2017-10-18 ENCOUNTER — OUTPATIENT (OUTPATIENT)
Dept: OUTPATIENT SERVICES | Facility: HOSPITAL | Age: 72
LOS: 1 days | End: 2017-10-18
Payer: MEDICARE

## 2017-10-18 VITALS
DIASTOLIC BLOOD PRESSURE: 76 MMHG | HEART RATE: 71 BPM | WEIGHT: 148.15 LBS | HEIGHT: 65 IN | SYSTOLIC BLOOD PRESSURE: 126 MMHG | BODY MASS INDEX: 24.68 KG/M2

## 2017-10-18 DIAGNOSIS — S52.201D UNSPECIFIED FRACTURE OF RIGHT FOREARM, SUBSEQUENT ENCOUNTER FOR CLOSED FRACTURE WITH ROUTINE HEALING: ICD-10-CM

## 2017-10-18 DIAGNOSIS — S52.91XD UNSPECIFIED FRACTURE OF RIGHT FOREARM, SUBSEQUENT ENCOUNTER FOR CLOSED FRACTURE WITH ROUTINE HEALING: ICD-10-CM

## 2017-10-18 DIAGNOSIS — Z90.710 ACQUIRED ABSENCE OF BOTH CERVIX AND UTERUS: Chronic | ICD-10-CM

## 2017-10-18 PROCEDURE — 99232 SBSQ HOSP IP/OBS MODERATE 35: CPT

## 2017-10-18 PROCEDURE — 73100 X-RAY EXAM OF WRIST: CPT | Mod: 26,LT

## 2017-10-18 RX ADMIN — Medication 1 MILLIGRAM(S): at 21:47

## 2017-10-18 RX ADMIN — OXYCODONE HYDROCHLORIDE 5 MILLIGRAM(S): 5 TABLET ORAL at 07:39

## 2017-10-18 RX ADMIN — NYSTATIN CREAM 1 APPLICATION(S): 100000 CREAM TOPICAL at 13:34

## 2017-10-18 RX ADMIN — SIMVASTATIN 10 MILLIGRAM(S): 20 TABLET, FILM COATED ORAL at 21:47

## 2017-10-18 RX ADMIN — NYSTATIN CREAM 1 APPLICATION(S): 100000 CREAM TOPICAL at 21:47

## 2017-10-18 RX ADMIN — DIVALPROEX SODIUM 1250 MILLIGRAM(S): 500 TABLET, DELAYED RELEASE ORAL at 21:47

## 2017-10-18 RX ADMIN — HALOPERIDOL DECANOATE 10 MILLIGRAM(S): 100 INJECTION INTRAMUSCULAR at 07:35

## 2017-10-18 RX ADMIN — OXYCODONE HYDROCHLORIDE 5 MILLIGRAM(S): 5 TABLET ORAL at 17:30

## 2017-10-18 RX ADMIN — OXYCODONE HYDROCHLORIDE 5 MILLIGRAM(S): 5 TABLET ORAL at 13:12

## 2017-10-18 RX ADMIN — OXYCODONE HYDROCHLORIDE 5 MILLIGRAM(S): 5 TABLET ORAL at 12:20

## 2017-10-18 RX ADMIN — Medication 1 MILLIGRAM(S): at 07:35

## 2017-10-18 RX ADMIN — AMLODIPINE BESYLATE 10 MILLIGRAM(S): 2.5 TABLET ORAL at 21:47

## 2017-10-18 RX ADMIN — HALOPERIDOL DECANOATE 15 MILLIGRAM(S): 100 INJECTION INTRAMUSCULAR at 21:47

## 2017-10-19 PROCEDURE — 99232 SBSQ HOSP IP/OBS MODERATE 35: CPT

## 2017-10-19 RX ADMIN — OXYCODONE HYDROCHLORIDE 5 MILLIGRAM(S): 5 TABLET ORAL at 13:00

## 2017-10-19 RX ADMIN — SIMVASTATIN 10 MILLIGRAM(S): 20 TABLET, FILM COATED ORAL at 20:31

## 2017-10-19 RX ADMIN — DIVALPROEX SODIUM 1250 MILLIGRAM(S): 500 TABLET, DELAYED RELEASE ORAL at 20:30

## 2017-10-19 RX ADMIN — HALOPERIDOL DECANOATE 10 MILLIGRAM(S): 100 INJECTION INTRAMUSCULAR at 09:18

## 2017-10-19 RX ADMIN — OXYCODONE HYDROCHLORIDE 5 MILLIGRAM(S): 5 TABLET ORAL at 12:00

## 2017-10-19 RX ADMIN — OXYCODONE HYDROCHLORIDE 5 MILLIGRAM(S): 5 TABLET ORAL at 06:12

## 2017-10-19 RX ADMIN — Medication 1 MILLIGRAM(S): at 09:18

## 2017-10-19 RX ADMIN — OXYCODONE HYDROCHLORIDE 5 MILLIGRAM(S): 5 TABLET ORAL at 22:59

## 2017-10-19 RX ADMIN — NYSTATIN CREAM 1 APPLICATION(S): 100000 CREAM TOPICAL at 09:19

## 2017-10-19 RX ADMIN — Medication 1 MILLIGRAM(S): at 20:30

## 2017-10-19 RX ADMIN — NYSTATIN CREAM 1 APPLICATION(S): 100000 CREAM TOPICAL at 20:31

## 2017-10-19 RX ADMIN — HALOPERIDOL DECANOATE 15 MILLIGRAM(S): 100 INJECTION INTRAMUSCULAR at 20:30

## 2017-10-19 RX ADMIN — AMLODIPINE BESYLATE 10 MILLIGRAM(S): 2.5 TABLET ORAL at 20:30

## 2017-10-19 RX ADMIN — OXYCODONE HYDROCHLORIDE 5 MILLIGRAM(S): 5 TABLET ORAL at 20:31

## 2017-10-19 RX ADMIN — Medication 650 MILLIGRAM(S): at 09:19

## 2017-10-19 RX ADMIN — OXYCODONE HYDROCHLORIDE 5 MILLIGRAM(S): 5 TABLET ORAL at 07:15

## 2017-10-20 LAB
ALBUMIN SERPL ELPH-MCNC: 2.6 G/DL — LOW (ref 3.3–5)
ALP SERPL-CCNC: 93 U/L — SIGNIFICANT CHANGE UP (ref 40–120)
ALT FLD-CCNC: 11 U/L — SIGNIFICANT CHANGE UP (ref 4–33)
AST SERPL-CCNC: 13 U/L — SIGNIFICANT CHANGE UP (ref 4–32)
BASOPHILS # BLD AUTO: 0.02 K/UL — SIGNIFICANT CHANGE UP (ref 0–0.2)
BASOPHILS NFR BLD AUTO: 0.3 % — SIGNIFICANT CHANGE UP (ref 0–2)
BILIRUB SERPL-MCNC: 0.2 MG/DL — SIGNIFICANT CHANGE UP (ref 0.2–1.2)
BUN SERPL-MCNC: 14 MG/DL — SIGNIFICANT CHANGE UP (ref 7–23)
CALCIUM SERPL-MCNC: 8.4 MG/DL — SIGNIFICANT CHANGE UP (ref 8.4–10.5)
CHLORIDE SERPL-SCNC: 101 MMOL/L — SIGNIFICANT CHANGE UP (ref 98–107)
CO2 SERPL-SCNC: 26 MMOL/L — SIGNIFICANT CHANGE UP (ref 22–31)
CREAT SERPL-MCNC: 0.64 MG/DL — SIGNIFICANT CHANGE UP (ref 0.5–1.3)
EOSINOPHIL # BLD AUTO: 0.11 K/UL — SIGNIFICANT CHANGE UP (ref 0–0.5)
EOSINOPHIL NFR BLD AUTO: 1.8 % — SIGNIFICANT CHANGE UP (ref 0–6)
GLUCOSE SERPL-MCNC: 155 MG/DL — HIGH (ref 70–99)
HCT VFR BLD CALC: 37 % — SIGNIFICANT CHANGE UP (ref 34.5–45)
HGB BLD-MCNC: 11.9 G/DL — SIGNIFICANT CHANGE UP (ref 11.5–15.5)
IMM GRANULOCYTES # BLD AUTO: 0.05 # — SIGNIFICANT CHANGE UP
IMM GRANULOCYTES NFR BLD AUTO: 0.8 % — SIGNIFICANT CHANGE UP (ref 0–1.5)
LYMPHOCYTES # BLD AUTO: 1.8 K/UL — SIGNIFICANT CHANGE UP (ref 1–3.3)
LYMPHOCYTES # BLD AUTO: 29.1 % — SIGNIFICANT CHANGE UP (ref 13–44)
MCHC RBC-ENTMCNC: 30.3 PG — SIGNIFICANT CHANGE UP (ref 27–34)
MCHC RBC-ENTMCNC: 32.2 % — SIGNIFICANT CHANGE UP (ref 32–36)
MCV RBC AUTO: 94.1 FL — SIGNIFICANT CHANGE UP (ref 80–100)
MONOCYTES # BLD AUTO: 0.57 K/UL — SIGNIFICANT CHANGE UP (ref 0–0.9)
MONOCYTES NFR BLD AUTO: 9.2 % — SIGNIFICANT CHANGE UP (ref 2–14)
NEUTROPHILS # BLD AUTO: 3.63 K/UL — SIGNIFICANT CHANGE UP (ref 1.8–7.4)
NEUTROPHILS NFR BLD AUTO: 58.8 % — SIGNIFICANT CHANGE UP (ref 43–77)
NRBC # FLD: 0 — SIGNIFICANT CHANGE UP
PLATELET # BLD AUTO: 195 K/UL — SIGNIFICANT CHANGE UP (ref 150–400)
PMV BLD: 11.1 FL — SIGNIFICANT CHANGE UP (ref 7–13)
POTASSIUM SERPL-MCNC: 3.7 MMOL/L — SIGNIFICANT CHANGE UP (ref 3.5–5.3)
POTASSIUM SERPL-SCNC: 3.7 MMOL/L — SIGNIFICANT CHANGE UP (ref 3.5–5.3)
PROT SERPL-MCNC: 5.3 G/DL — LOW (ref 6–8.3)
RBC # BLD: 3.93 M/UL — SIGNIFICANT CHANGE UP (ref 3.8–5.2)
RBC # FLD: 14.5 % — SIGNIFICANT CHANGE UP (ref 10.3–14.5)
SODIUM SERPL-SCNC: 140 MMOL/L — SIGNIFICANT CHANGE UP (ref 135–145)
VALPROATE SERPL-MCNC: 77.2 UG/ML — SIGNIFICANT CHANGE UP (ref 50–100)
WBC # BLD: 6.18 K/UL — SIGNIFICANT CHANGE UP (ref 3.8–10.5)
WBC # FLD AUTO: 6.18 K/UL — SIGNIFICANT CHANGE UP (ref 3.8–10.5)

## 2017-10-20 PROCEDURE — 99232 SBSQ HOSP IP/OBS MODERATE 35: CPT

## 2017-10-20 RX ORDER — OXYCODONE HYDROCHLORIDE 5 MG/1
5 TABLET ORAL EVERY 4 HOURS
Qty: 0 | Refills: 0 | Status: DISCONTINUED | OUTPATIENT
Start: 2017-10-20 | End: 2017-10-25

## 2017-10-20 RX ADMIN — DIVALPROEX SODIUM 1250 MILLIGRAM(S): 500 TABLET, DELAYED RELEASE ORAL at 20:57

## 2017-10-20 RX ADMIN — Medication 1 MILLIGRAM(S): at 08:15

## 2017-10-20 RX ADMIN — OXYCODONE HYDROCHLORIDE 5 MILLIGRAM(S): 5 TABLET ORAL at 08:03

## 2017-10-20 RX ADMIN — OXYCODONE HYDROCHLORIDE 5 MILLIGRAM(S): 5 TABLET ORAL at 21:04

## 2017-10-20 RX ADMIN — OXYCODONE HYDROCHLORIDE 5 MILLIGRAM(S): 5 TABLET ORAL at 12:32

## 2017-10-20 RX ADMIN — SIMVASTATIN 10 MILLIGRAM(S): 20 TABLET, FILM COATED ORAL at 20:57

## 2017-10-20 RX ADMIN — OXYCODONE HYDROCHLORIDE 5 MILLIGRAM(S): 5 TABLET ORAL at 22:03

## 2017-10-20 RX ADMIN — OXYCODONE HYDROCHLORIDE 5 MILLIGRAM(S): 5 TABLET ORAL at 11:30

## 2017-10-20 RX ADMIN — AMLODIPINE BESYLATE 10 MILLIGRAM(S): 2.5 TABLET ORAL at 20:57

## 2017-10-20 RX ADMIN — HALOPERIDOL DECANOATE 15 MILLIGRAM(S): 100 INJECTION INTRAMUSCULAR at 20:57

## 2017-10-20 RX ADMIN — NYSTATIN CREAM 1 APPLICATION(S): 100000 CREAM TOPICAL at 20:57

## 2017-10-20 RX ADMIN — Medication 1 MILLIGRAM(S): at 20:57

## 2017-10-20 RX ADMIN — Medication 650 MILLIGRAM(S): at 16:04

## 2017-10-20 RX ADMIN — OXYCODONE HYDROCHLORIDE 5 MILLIGRAM(S): 5 TABLET ORAL at 06:57

## 2017-10-20 RX ADMIN — NYSTATIN CREAM 1 APPLICATION(S): 100000 CREAM TOPICAL at 08:15

## 2017-10-20 RX ADMIN — HALOPERIDOL DECANOATE 10 MILLIGRAM(S): 100 INJECTION INTRAMUSCULAR at 08:15

## 2017-10-21 LAB — HALOPERIDOL SERPL-MCNC: 50 NG/ML — HIGH (ref 5–15)

## 2017-10-21 PROCEDURE — 99232 SBSQ HOSP IP/OBS MODERATE 35: CPT

## 2017-10-21 RX ADMIN — Medication 1 MILLIGRAM(S): at 10:28

## 2017-10-21 RX ADMIN — HALOPERIDOL DECANOATE 15 MILLIGRAM(S): 100 INJECTION INTRAMUSCULAR at 21:16

## 2017-10-21 RX ADMIN — AMLODIPINE BESYLATE 10 MILLIGRAM(S): 2.5 TABLET ORAL at 21:16

## 2017-10-21 RX ADMIN — Medication 650 MILLIGRAM(S): at 06:28

## 2017-10-21 RX ADMIN — NYSTATIN CREAM 1 APPLICATION(S): 100000 CREAM TOPICAL at 21:16

## 2017-10-21 RX ADMIN — OXYCODONE HYDROCHLORIDE 5 MILLIGRAM(S): 5 TABLET ORAL at 23:49

## 2017-10-21 RX ADMIN — OXYCODONE HYDROCHLORIDE 5 MILLIGRAM(S): 5 TABLET ORAL at 16:46

## 2017-10-21 RX ADMIN — OXYCODONE HYDROCHLORIDE 5 MILLIGRAM(S): 5 TABLET ORAL at 09:26

## 2017-10-21 RX ADMIN — Medication 1 MILLIGRAM(S): at 21:16

## 2017-10-21 RX ADMIN — OXYCODONE HYDROCHLORIDE 5 MILLIGRAM(S): 5 TABLET ORAL at 17:37

## 2017-10-21 RX ADMIN — OXYCODONE HYDROCHLORIDE 5 MILLIGRAM(S): 5 TABLET ORAL at 07:52

## 2017-10-21 RX ADMIN — HALOPERIDOL DECANOATE 10 MILLIGRAM(S): 100 INJECTION INTRAMUSCULAR at 10:28

## 2017-10-21 RX ADMIN — SIMVASTATIN 10 MILLIGRAM(S): 20 TABLET, FILM COATED ORAL at 21:16

## 2017-10-21 RX ADMIN — DIVALPROEX SODIUM 1250 MILLIGRAM(S): 500 TABLET, DELAYED RELEASE ORAL at 21:16

## 2017-10-22 PROCEDURE — 99232 SBSQ HOSP IP/OBS MODERATE 35: CPT

## 2017-10-22 RX ADMIN — OXYCODONE HYDROCHLORIDE 5 MILLIGRAM(S): 5 TABLET ORAL at 12:40

## 2017-10-22 RX ADMIN — HALOPERIDOL DECANOATE 15 MILLIGRAM(S): 100 INJECTION INTRAMUSCULAR at 21:03

## 2017-10-22 RX ADMIN — DIVALPROEX SODIUM 1250 MILLIGRAM(S): 500 TABLET, DELAYED RELEASE ORAL at 21:03

## 2017-10-22 RX ADMIN — OXYCODONE HYDROCHLORIDE 5 MILLIGRAM(S): 5 TABLET ORAL at 06:39

## 2017-10-22 RX ADMIN — OXYCODONE HYDROCHLORIDE 5 MILLIGRAM(S): 5 TABLET ORAL at 01:28

## 2017-10-22 RX ADMIN — Medication 1 MILLIGRAM(S): at 10:09

## 2017-10-22 RX ADMIN — SIMVASTATIN 10 MILLIGRAM(S): 20 TABLET, FILM COATED ORAL at 21:03

## 2017-10-22 RX ADMIN — OXYCODONE HYDROCHLORIDE 5 MILLIGRAM(S): 5 TABLET ORAL at 05:35

## 2017-10-22 RX ADMIN — NYSTATIN CREAM 1 APPLICATION(S): 100000 CREAM TOPICAL at 21:03

## 2017-10-22 RX ADMIN — OXYCODONE HYDROCHLORIDE 5 MILLIGRAM(S): 5 TABLET ORAL at 13:30

## 2017-10-22 RX ADMIN — AMLODIPINE BESYLATE 10 MILLIGRAM(S): 2.5 TABLET ORAL at 21:03

## 2017-10-22 RX ADMIN — NYSTATIN CREAM 1 APPLICATION(S): 100000 CREAM TOPICAL at 12:28

## 2017-10-22 RX ADMIN — HALOPERIDOL DECANOATE 10 MILLIGRAM(S): 100 INJECTION INTRAMUSCULAR at 10:09

## 2017-10-22 RX ADMIN — Medication 1 MILLIGRAM(S): at 21:03

## 2017-10-23 DIAGNOSIS — S62.102A FRACTURE OF UNSPECIFIED CARPAL BONE, LEFT WRIST, INITIAL ENCOUNTER FOR CLOSED FRACTURE: ICD-10-CM

## 2017-10-23 PROCEDURE — 99232 SBSQ HOSP IP/OBS MODERATE 35: CPT

## 2017-10-23 RX ORDER — HALOPERIDOL DECANOATE 100 MG/ML
10 INJECTION INTRAMUSCULAR AT BEDTIME
Qty: 0 | Refills: 0 | Status: DISCONTINUED | OUTPATIENT
Start: 2017-10-23 | End: 2017-10-24

## 2017-10-23 RX ADMIN — OXYCODONE HYDROCHLORIDE 5 MILLIGRAM(S): 5 TABLET ORAL at 11:48

## 2017-10-23 RX ADMIN — AMLODIPINE BESYLATE 10 MILLIGRAM(S): 2.5 TABLET ORAL at 20:38

## 2017-10-23 RX ADMIN — HALOPERIDOL DECANOATE 10 MILLIGRAM(S): 100 INJECTION INTRAMUSCULAR at 09:06

## 2017-10-23 RX ADMIN — SIMVASTATIN 10 MILLIGRAM(S): 20 TABLET, FILM COATED ORAL at 20:39

## 2017-10-23 RX ADMIN — NYSTATIN CREAM 1 APPLICATION(S): 100000 CREAM TOPICAL at 20:39

## 2017-10-23 RX ADMIN — OXYCODONE HYDROCHLORIDE 5 MILLIGRAM(S): 5 TABLET ORAL at 20:39

## 2017-10-23 RX ADMIN — Medication 1 MILLIGRAM(S): at 20:38

## 2017-10-23 RX ADMIN — OXYCODONE HYDROCHLORIDE 5 MILLIGRAM(S): 5 TABLET ORAL at 22:05

## 2017-10-23 RX ADMIN — HALOPERIDOL DECANOATE 10 MILLIGRAM(S): 100 INJECTION INTRAMUSCULAR at 20:38

## 2017-10-23 RX ADMIN — DIVALPROEX SODIUM 1250 MILLIGRAM(S): 500 TABLET, DELAYED RELEASE ORAL at 20:38

## 2017-10-23 RX ADMIN — OXYCODONE HYDROCHLORIDE 5 MILLIGRAM(S): 5 TABLET ORAL at 09:07

## 2017-10-23 RX ADMIN — Medication 1 MILLIGRAM(S): at 09:06

## 2017-10-23 RX ADMIN — NYSTATIN CREAM 1 APPLICATION(S): 100000 CREAM TOPICAL at 09:06

## 2017-10-24 PROCEDURE — 99233 SBSQ HOSP IP/OBS HIGH 50: CPT

## 2017-10-24 PROCEDURE — 99232 SBSQ HOSP IP/OBS MODERATE 35: CPT

## 2017-10-24 PROCEDURE — 93010 ELECTROCARDIOGRAM REPORT: CPT

## 2017-10-24 RX ORDER — HALOPERIDOL DECANOATE 100 MG/ML
15 INJECTION INTRAMUSCULAR AT BEDTIME
Qty: 0 | Refills: 0 | Status: DISCONTINUED | OUTPATIENT
Start: 2017-10-24 | End: 2017-12-04

## 2017-10-24 RX ORDER — CLONAZEPAM 1 MG
1 TABLET ORAL
Qty: 0 | Refills: 0 | Status: DISCONTINUED | OUTPATIENT
Start: 2017-10-24 | End: 2017-10-31

## 2017-10-24 RX ADMIN — OXYCODONE HYDROCHLORIDE 5 MILLIGRAM(S): 5 TABLET ORAL at 20:53

## 2017-10-24 RX ADMIN — OXYCODONE HYDROCHLORIDE 5 MILLIGRAM(S): 5 TABLET ORAL at 05:00

## 2017-10-24 RX ADMIN — Medication 1 MILLIGRAM(S): at 10:06

## 2017-10-24 RX ADMIN — DIVALPROEX SODIUM 1250 MILLIGRAM(S): 500 TABLET, DELAYED RELEASE ORAL at 20:52

## 2017-10-24 RX ADMIN — HALOPERIDOL DECANOATE 15 MILLIGRAM(S): 100 INJECTION INTRAMUSCULAR at 20:52

## 2017-10-24 RX ADMIN — OXYCODONE HYDROCHLORIDE 5 MILLIGRAM(S): 5 TABLET ORAL at 20:16

## 2017-10-24 RX ADMIN — OXYCODONE HYDROCHLORIDE 5 MILLIGRAM(S): 5 TABLET ORAL at 04:12

## 2017-10-24 RX ADMIN — OXYCODONE HYDROCHLORIDE 5 MILLIGRAM(S): 5 TABLET ORAL at 11:00

## 2017-10-24 RX ADMIN — OXYCODONE HYDROCHLORIDE 5 MILLIGRAM(S): 5 TABLET ORAL at 16:05

## 2017-10-24 RX ADMIN — HALOPERIDOL DECANOATE 10 MILLIGRAM(S): 100 INJECTION INTRAMUSCULAR at 10:06

## 2017-10-24 RX ADMIN — Medication 1 MILLIGRAM(S): at 20:52

## 2017-10-24 RX ADMIN — NYSTATIN CREAM 1 APPLICATION(S): 100000 CREAM TOPICAL at 10:06

## 2017-10-24 RX ADMIN — OXYCODONE HYDROCHLORIDE 5 MILLIGRAM(S): 5 TABLET ORAL at 10:00

## 2017-10-24 RX ADMIN — OXYCODONE HYDROCHLORIDE 5 MILLIGRAM(S): 5 TABLET ORAL at 16:00

## 2017-10-24 RX ADMIN — SIMVASTATIN 10 MILLIGRAM(S): 20 TABLET, FILM COATED ORAL at 20:52

## 2017-10-24 RX ADMIN — NYSTATIN CREAM 1 APPLICATION(S): 100000 CREAM TOPICAL at 20:52

## 2017-10-24 RX ADMIN — AMLODIPINE BESYLATE 10 MILLIGRAM(S): 2.5 TABLET ORAL at 20:52

## 2017-10-25 PROCEDURE — 99232 SBSQ HOSP IP/OBS MODERATE 35: CPT

## 2017-10-25 RX ORDER — OXYCODONE HYDROCHLORIDE 5 MG/1
5 TABLET ORAL EVERY 4 HOURS
Qty: 0 | Refills: 0 | Status: DISCONTINUED | OUTPATIENT
Start: 2017-10-25 | End: 2017-10-29

## 2017-10-25 RX ADMIN — NYSTATIN CREAM 1 APPLICATION(S): 100000 CREAM TOPICAL at 20:04

## 2017-10-25 RX ADMIN — OXYCODONE HYDROCHLORIDE 5 MILLIGRAM(S): 5 TABLET ORAL at 07:40

## 2017-10-25 RX ADMIN — SIMVASTATIN 10 MILLIGRAM(S): 20 TABLET, FILM COATED ORAL at 20:04

## 2017-10-25 RX ADMIN — HALOPERIDOL DECANOATE 10 MILLIGRAM(S): 100 INJECTION INTRAMUSCULAR at 07:40

## 2017-10-25 RX ADMIN — Medication 1 MILLIGRAM(S): at 20:04

## 2017-10-25 RX ADMIN — DIVALPROEX SODIUM 1250 MILLIGRAM(S): 500 TABLET, DELAYED RELEASE ORAL at 20:04

## 2017-10-25 RX ADMIN — OXYCODONE HYDROCHLORIDE 5 MILLIGRAM(S): 5 TABLET ORAL at 18:30

## 2017-10-25 RX ADMIN — Medication 1 MILLIGRAM(S): at 07:38

## 2017-10-25 RX ADMIN — OXYCODONE HYDROCHLORIDE 5 MILLIGRAM(S): 5 TABLET ORAL at 08:40

## 2017-10-25 RX ADMIN — AMLODIPINE BESYLATE 10 MILLIGRAM(S): 2.5 TABLET ORAL at 20:04

## 2017-10-25 RX ADMIN — HALOPERIDOL DECANOATE 15 MILLIGRAM(S): 100 INJECTION INTRAMUSCULAR at 20:04

## 2017-10-25 RX ADMIN — OXYCODONE HYDROCHLORIDE 5 MILLIGRAM(S): 5 TABLET ORAL at 22:40

## 2017-10-25 RX ADMIN — OXYCODONE HYDROCHLORIDE 5 MILLIGRAM(S): 5 TABLET ORAL at 19:25

## 2017-10-26 PROCEDURE — 99232 SBSQ HOSP IP/OBS MODERATE 35: CPT

## 2017-10-26 RX ADMIN — HALOPERIDOL DECANOATE 10 MILLIGRAM(S): 100 INJECTION INTRAMUSCULAR at 08:35

## 2017-10-26 RX ADMIN — OXYCODONE HYDROCHLORIDE 5 MILLIGRAM(S): 5 TABLET ORAL at 14:38

## 2017-10-26 RX ADMIN — OXYCODONE HYDROCHLORIDE 5 MILLIGRAM(S): 5 TABLET ORAL at 06:23

## 2017-10-26 RX ADMIN — Medication 650 MILLIGRAM(S): at 17:04

## 2017-10-26 RX ADMIN — HALOPERIDOL DECANOATE 15 MILLIGRAM(S): 100 INJECTION INTRAMUSCULAR at 20:18

## 2017-10-26 RX ADMIN — AMLODIPINE BESYLATE 10 MILLIGRAM(S): 2.5 TABLET ORAL at 20:18

## 2017-10-26 RX ADMIN — DIVALPROEX SODIUM 1250 MILLIGRAM(S): 500 TABLET, DELAYED RELEASE ORAL at 20:18

## 2017-10-26 RX ADMIN — Medication 1 MILLIGRAM(S): at 08:35

## 2017-10-26 RX ADMIN — OXYCODONE HYDROCHLORIDE 5 MILLIGRAM(S): 5 TABLET ORAL at 07:30

## 2017-10-26 RX ADMIN — SIMVASTATIN 10 MILLIGRAM(S): 20 TABLET, FILM COATED ORAL at 20:18

## 2017-10-26 RX ADMIN — OXYCODONE HYDROCHLORIDE 5 MILLIGRAM(S): 5 TABLET ORAL at 00:10

## 2017-10-26 RX ADMIN — Medication 1 MILLIGRAM(S): at 20:18

## 2017-10-27 PROCEDURE — 99232 SBSQ HOSP IP/OBS MODERATE 35: CPT

## 2017-10-27 RX ADMIN — SIMVASTATIN 10 MILLIGRAM(S): 20 TABLET, FILM COATED ORAL at 21:07

## 2017-10-27 RX ADMIN — OXYCODONE HYDROCHLORIDE 5 MILLIGRAM(S): 5 TABLET ORAL at 02:50

## 2017-10-27 RX ADMIN — HALOPERIDOL DECANOATE 10 MILLIGRAM(S): 100 INJECTION INTRAMUSCULAR at 12:15

## 2017-10-27 RX ADMIN — DIVALPROEX SODIUM 1250 MILLIGRAM(S): 500 TABLET, DELAYED RELEASE ORAL at 20:56

## 2017-10-27 RX ADMIN — NYSTATIN CREAM 1 APPLICATION(S): 100000 CREAM TOPICAL at 21:07

## 2017-10-27 RX ADMIN — OXYCODONE HYDROCHLORIDE 5 MILLIGRAM(S): 5 TABLET ORAL at 22:41

## 2017-10-27 RX ADMIN — Medication 1 MILLIGRAM(S): at 20:56

## 2017-10-27 RX ADMIN — OXYCODONE HYDROCHLORIDE 5 MILLIGRAM(S): 5 TABLET ORAL at 01:19

## 2017-10-27 RX ADMIN — OXYCODONE HYDROCHLORIDE 5 MILLIGRAM(S): 5 TABLET ORAL at 12:15

## 2017-10-27 RX ADMIN — AMLODIPINE BESYLATE 10 MILLIGRAM(S): 2.5 TABLET ORAL at 20:55

## 2017-10-27 RX ADMIN — OXYCODONE HYDROCHLORIDE 5 MILLIGRAM(S): 5 TABLET ORAL at 15:07

## 2017-10-27 RX ADMIN — Medication 1 MILLIGRAM(S): at 12:15

## 2017-10-27 RX ADMIN — HALOPERIDOL DECANOATE 15 MILLIGRAM(S): 100 INJECTION INTRAMUSCULAR at 21:07

## 2017-10-28 PROCEDURE — 99232 SBSQ HOSP IP/OBS MODERATE 35: CPT

## 2017-10-28 RX ADMIN — HALOPERIDOL DECANOATE 10 MILLIGRAM(S): 100 INJECTION INTRAMUSCULAR at 08:55

## 2017-10-28 RX ADMIN — NYSTATIN CREAM 1 APPLICATION(S): 100000 CREAM TOPICAL at 20:36

## 2017-10-28 RX ADMIN — NYSTATIN CREAM 1 APPLICATION(S): 100000 CREAM TOPICAL at 08:55

## 2017-10-28 RX ADMIN — SIMVASTATIN 10 MILLIGRAM(S): 20 TABLET, FILM COATED ORAL at 20:36

## 2017-10-28 RX ADMIN — OXYCODONE HYDROCHLORIDE 5 MILLIGRAM(S): 5 TABLET ORAL at 17:23

## 2017-10-28 RX ADMIN — AMLODIPINE BESYLATE 10 MILLIGRAM(S): 2.5 TABLET ORAL at 20:34

## 2017-10-28 RX ADMIN — Medication 650 MILLIGRAM(S): at 20:37

## 2017-10-28 RX ADMIN — Medication 1 MILLIGRAM(S): at 20:36

## 2017-10-28 RX ADMIN — OXYCODONE HYDROCHLORIDE 5 MILLIGRAM(S): 5 TABLET ORAL at 09:21

## 2017-10-28 RX ADMIN — DIVALPROEX SODIUM 1250 MILLIGRAM(S): 500 TABLET, DELAYED RELEASE ORAL at 20:36

## 2017-10-28 RX ADMIN — HALOPERIDOL DECANOATE 15 MILLIGRAM(S): 100 INJECTION INTRAMUSCULAR at 20:36

## 2017-10-28 RX ADMIN — OXYCODONE HYDROCHLORIDE 5 MILLIGRAM(S): 5 TABLET ORAL at 03:51

## 2017-10-28 RX ADMIN — Medication 1 MILLIGRAM(S): at 08:55

## 2017-10-29 PROCEDURE — 99232 SBSQ HOSP IP/OBS MODERATE 35: CPT

## 2017-10-29 RX ADMIN — OXYCODONE HYDROCHLORIDE 5 MILLIGRAM(S): 5 TABLET ORAL at 02:43

## 2017-10-29 RX ADMIN — OXYCODONE HYDROCHLORIDE 5 MILLIGRAM(S): 5 TABLET ORAL at 14:12

## 2017-10-29 RX ADMIN — DIVALPROEX SODIUM 1250 MILLIGRAM(S): 500 TABLET, DELAYED RELEASE ORAL at 20:54

## 2017-10-29 RX ADMIN — NYSTATIN CREAM 1 APPLICATION(S): 100000 CREAM TOPICAL at 20:54

## 2017-10-29 RX ADMIN — Medication 650 MILLIGRAM(S): at 16:48

## 2017-10-29 RX ADMIN — SIMVASTATIN 10 MILLIGRAM(S): 20 TABLET, FILM COATED ORAL at 20:54

## 2017-10-29 RX ADMIN — Medication 1 MILLIGRAM(S): at 20:54

## 2017-10-29 RX ADMIN — Medication 1 MILLIGRAM(S): at 09:40

## 2017-10-29 RX ADMIN — AMLODIPINE BESYLATE 10 MILLIGRAM(S): 2.5 TABLET ORAL at 20:54

## 2017-10-29 RX ADMIN — HALOPERIDOL DECANOATE 10 MILLIGRAM(S): 100 INJECTION INTRAMUSCULAR at 09:40

## 2017-10-29 RX ADMIN — HALOPERIDOL DECANOATE 15 MILLIGRAM(S): 100 INJECTION INTRAMUSCULAR at 20:54

## 2017-10-29 RX ADMIN — OXYCODONE HYDROCHLORIDE 5 MILLIGRAM(S): 5 TABLET ORAL at 15:20

## 2017-10-29 RX ADMIN — OXYCODONE HYDROCHLORIDE 5 MILLIGRAM(S): 5 TABLET ORAL at 01:43

## 2017-10-30 PROCEDURE — 99232 SBSQ HOSP IP/OBS MODERATE 35: CPT

## 2017-10-30 RX ADMIN — SIMVASTATIN 10 MILLIGRAM(S): 20 TABLET, FILM COATED ORAL at 21:35

## 2017-10-30 RX ADMIN — Medication 1 MILLIGRAM(S): at 09:02

## 2017-10-30 RX ADMIN — DIVALPROEX SODIUM 1250 MILLIGRAM(S): 500 TABLET, DELAYED RELEASE ORAL at 21:35

## 2017-10-30 RX ADMIN — AMLODIPINE BESYLATE 10 MILLIGRAM(S): 2.5 TABLET ORAL at 20:48

## 2017-10-30 RX ADMIN — NYSTATIN CREAM 1 APPLICATION(S): 100000 CREAM TOPICAL at 21:35

## 2017-10-30 RX ADMIN — HALOPERIDOL DECANOATE 15 MILLIGRAM(S): 100 INJECTION INTRAMUSCULAR at 21:35

## 2017-10-30 RX ADMIN — NYSTATIN CREAM 1 APPLICATION(S): 100000 CREAM TOPICAL at 09:02

## 2017-10-30 RX ADMIN — Medication 1 MILLIGRAM(S): at 21:35

## 2017-10-30 RX ADMIN — HALOPERIDOL DECANOATE 10 MILLIGRAM(S): 100 INJECTION INTRAMUSCULAR at 09:02

## 2017-10-31 PROCEDURE — 99232 SBSQ HOSP IP/OBS MODERATE 35: CPT

## 2017-10-31 RX ORDER — CLONAZEPAM 1 MG
1 TABLET ORAL
Qty: 0 | Refills: 0 | Status: DISCONTINUED | OUTPATIENT
Start: 2017-10-31 | End: 2017-11-07

## 2017-10-31 RX ADMIN — NYSTATIN CREAM 1 APPLICATION(S): 100000 CREAM TOPICAL at 11:57

## 2017-10-31 RX ADMIN — HALOPERIDOL DECANOATE 10 MILLIGRAM(S): 100 INJECTION INTRAMUSCULAR at 11:57

## 2017-10-31 RX ADMIN — DIVALPROEX SODIUM 1250 MILLIGRAM(S): 500 TABLET, DELAYED RELEASE ORAL at 21:12

## 2017-10-31 RX ADMIN — AMLODIPINE BESYLATE 10 MILLIGRAM(S): 2.5 TABLET ORAL at 21:12

## 2017-10-31 RX ADMIN — SIMVASTATIN 10 MILLIGRAM(S): 20 TABLET, FILM COATED ORAL at 21:12

## 2017-10-31 RX ADMIN — Medication 1 MILLIGRAM(S): at 11:57

## 2017-10-31 RX ADMIN — Medication 1 MILLIGRAM(S): at 21:12

## 2017-10-31 RX ADMIN — NYSTATIN CREAM 1 APPLICATION(S): 100000 CREAM TOPICAL at 21:26

## 2017-10-31 RX ADMIN — Medication 650 MILLIGRAM(S): at 13:21

## 2017-10-31 RX ADMIN — HALOPERIDOL DECANOATE 15 MILLIGRAM(S): 100 INJECTION INTRAMUSCULAR at 21:12

## 2017-11-01 ENCOUNTER — APPOINTMENT (OUTPATIENT)
Dept: RADIOLOGY | Facility: HOSPITAL | Age: 72
End: 2017-11-01

## 2017-11-01 ENCOUNTER — OUTPATIENT (OUTPATIENT)
Dept: OUTPATIENT SERVICES | Facility: HOSPITAL | Age: 72
LOS: 1 days | End: 2017-11-01
Payer: MEDICARE

## 2017-11-01 ENCOUNTER — APPOINTMENT (OUTPATIENT)
Dept: ORTHOPEDIC SURGERY | Facility: HOSPITAL | Age: 72
End: 2017-11-01

## 2017-11-01 VITALS
DIASTOLIC BLOOD PRESSURE: 73 MMHG | BODY MASS INDEX: 24.29 KG/M2 | SYSTOLIC BLOOD PRESSURE: 137 MMHG | WEIGHT: 145.94 LBS | HEART RATE: 63 BPM

## 2017-11-01 DIAGNOSIS — S62.102A FRACTURE OF UNSPECIFIED CARPAL BONE, LEFT WRIST, INITIAL ENCOUNTER FOR CLOSED FRACTURE: ICD-10-CM

## 2017-11-01 DIAGNOSIS — S62.101P: ICD-10-CM

## 2017-11-01 DIAGNOSIS — Z90.710 ACQUIRED ABSENCE OF BOTH CERVIX AND UTERUS: Chronic | ICD-10-CM

## 2017-11-01 PROCEDURE — 99232 SBSQ HOSP IP/OBS MODERATE 35: CPT

## 2017-11-01 PROCEDURE — 73110 X-RAY EXAM OF WRIST: CPT | Mod: 26,LT

## 2017-11-01 RX ADMIN — DIVALPROEX SODIUM 1250 MILLIGRAM(S): 500 TABLET, DELAYED RELEASE ORAL at 21:03

## 2017-11-01 RX ADMIN — Medication 1 MILLIGRAM(S): at 08:55

## 2017-11-01 RX ADMIN — NYSTATIN CREAM 1 APPLICATION(S): 100000 CREAM TOPICAL at 21:03

## 2017-11-01 RX ADMIN — Medication 1 MILLIGRAM(S): at 21:03

## 2017-11-01 RX ADMIN — SIMVASTATIN 10 MILLIGRAM(S): 20 TABLET, FILM COATED ORAL at 21:03

## 2017-11-01 RX ADMIN — HALOPERIDOL DECANOATE 15 MILLIGRAM(S): 100 INJECTION INTRAMUSCULAR at 21:03

## 2017-11-01 RX ADMIN — AMLODIPINE BESYLATE 10 MILLIGRAM(S): 2.5 TABLET ORAL at 21:03

## 2017-11-01 RX ADMIN — NYSTATIN CREAM 1 APPLICATION(S): 100000 CREAM TOPICAL at 08:55

## 2017-11-01 RX ADMIN — HALOPERIDOL DECANOATE 10 MILLIGRAM(S): 100 INJECTION INTRAMUSCULAR at 08:55

## 2017-11-01 RX ADMIN — Medication 650 MILLIGRAM(S): at 17:46

## 2017-11-02 DIAGNOSIS — S62.102A FRACTURE OF UNSPECIFIED CARPAL BONE, LEFT WRIST, INITIAL ENCOUNTER FOR CLOSED FRACTURE: ICD-10-CM

## 2017-11-02 PROCEDURE — 99232 SBSQ HOSP IP/OBS MODERATE 35: CPT

## 2017-11-02 RX ORDER — SENNA PLUS 8.6 MG/1
2 TABLET ORAL AT BEDTIME
Qty: 0 | Refills: 0 | Status: DISCONTINUED | OUTPATIENT
Start: 2017-11-02 | End: 2017-12-20

## 2017-11-02 RX ORDER — POLYETHYLENE GLYCOL 3350 17 G/17G
17 POWDER, FOR SOLUTION ORAL ONCE
Qty: 0 | Refills: 0 | Status: COMPLETED | OUTPATIENT
Start: 2017-11-02 | End: 2017-11-02

## 2017-11-02 RX ADMIN — NYSTATIN CREAM 1 APPLICATION(S): 100000 CREAM TOPICAL at 08:25

## 2017-11-02 RX ADMIN — NYSTATIN CREAM 1 APPLICATION(S): 100000 CREAM TOPICAL at 21:20

## 2017-11-02 RX ADMIN — Medication 1 MILLIGRAM(S): at 08:25

## 2017-11-02 RX ADMIN — Medication 650 MILLIGRAM(S): at 08:25

## 2017-11-02 RX ADMIN — SENNA PLUS 2 TABLET(S): 8.6 TABLET ORAL at 21:20

## 2017-11-02 RX ADMIN — DIVALPROEX SODIUM 1250 MILLIGRAM(S): 500 TABLET, DELAYED RELEASE ORAL at 21:20

## 2017-11-02 RX ADMIN — HALOPERIDOL DECANOATE 15 MILLIGRAM(S): 100 INJECTION INTRAMUSCULAR at 21:20

## 2017-11-02 RX ADMIN — Medication 650 MILLIGRAM(S): at 16:01

## 2017-11-02 RX ADMIN — SIMVASTATIN 10 MILLIGRAM(S): 20 TABLET, FILM COATED ORAL at 21:20

## 2017-11-02 RX ADMIN — POLYETHYLENE GLYCOL 3350 17 GRAM(S): 17 POWDER, FOR SOLUTION ORAL at 21:19

## 2017-11-02 RX ADMIN — AMLODIPINE BESYLATE 10 MILLIGRAM(S): 2.5 TABLET ORAL at 21:20

## 2017-11-02 RX ADMIN — HALOPERIDOL DECANOATE 10 MILLIGRAM(S): 100 INJECTION INTRAMUSCULAR at 08:25

## 2017-11-02 RX ADMIN — Medication 1 MILLIGRAM(S): at 21:20

## 2017-11-03 PROCEDURE — 99232 SBSQ HOSP IP/OBS MODERATE 35: CPT

## 2017-11-03 RX ADMIN — Medication 650 MILLIGRAM(S): at 15:11

## 2017-11-03 RX ADMIN — Medication 650 MILLIGRAM(S): at 08:00

## 2017-11-03 RX ADMIN — NYSTATIN CREAM 1 APPLICATION(S): 100000 CREAM TOPICAL at 08:59

## 2017-11-03 RX ADMIN — Medication 1 MILLIGRAM(S): at 08:59

## 2017-11-03 RX ADMIN — Medication 650 MILLIGRAM(S): at 20:42

## 2017-11-03 RX ADMIN — SIMVASTATIN 10 MILLIGRAM(S): 20 TABLET, FILM COATED ORAL at 20:35

## 2017-11-03 RX ADMIN — HALOPERIDOL DECANOATE 15 MILLIGRAM(S): 100 INJECTION INTRAMUSCULAR at 20:35

## 2017-11-03 RX ADMIN — HALOPERIDOL DECANOATE 10 MILLIGRAM(S): 100 INJECTION INTRAMUSCULAR at 08:59

## 2017-11-03 RX ADMIN — Medication 1 MILLIGRAM(S): at 20:35

## 2017-11-03 RX ADMIN — SENNA PLUS 2 TABLET(S): 8.6 TABLET ORAL at 20:35

## 2017-11-03 RX ADMIN — NYSTATIN CREAM 1 APPLICATION(S): 100000 CREAM TOPICAL at 21:57

## 2017-11-03 RX ADMIN — AMLODIPINE BESYLATE 10 MILLIGRAM(S): 2.5 TABLET ORAL at 20:35

## 2017-11-03 RX ADMIN — DIVALPROEX SODIUM 1250 MILLIGRAM(S): 500 TABLET, DELAYED RELEASE ORAL at 20:35

## 2017-11-04 PROCEDURE — 99232 SBSQ HOSP IP/OBS MODERATE 35: CPT

## 2017-11-04 RX ADMIN — SIMVASTATIN 10 MILLIGRAM(S): 20 TABLET, FILM COATED ORAL at 20:41

## 2017-11-04 RX ADMIN — DIVALPROEX SODIUM 1250 MILLIGRAM(S): 500 TABLET, DELAYED RELEASE ORAL at 20:41

## 2017-11-04 RX ADMIN — Medication 1 MILLIGRAM(S): at 08:23

## 2017-11-04 RX ADMIN — Medication 1 MILLIGRAM(S): at 20:41

## 2017-11-04 RX ADMIN — NYSTATIN CREAM 1 APPLICATION(S): 100000 CREAM TOPICAL at 20:41

## 2017-11-04 RX ADMIN — Medication 650 MILLIGRAM(S): at 08:20

## 2017-11-04 RX ADMIN — AMLODIPINE BESYLATE 10 MILLIGRAM(S): 2.5 TABLET ORAL at 20:41

## 2017-11-04 RX ADMIN — HALOPERIDOL DECANOATE 15 MILLIGRAM(S): 100 INJECTION INTRAMUSCULAR at 20:41

## 2017-11-04 RX ADMIN — SENNA PLUS 2 TABLET(S): 8.6 TABLET ORAL at 20:41

## 2017-11-04 RX ADMIN — HALOPERIDOL DECANOATE 10 MILLIGRAM(S): 100 INJECTION INTRAMUSCULAR at 08:23

## 2017-11-05 PROCEDURE — 99231 SBSQ HOSP IP/OBS SF/LOW 25: CPT

## 2017-11-05 RX ADMIN — SENNA PLUS 2 TABLET(S): 8.6 TABLET ORAL at 20:51

## 2017-11-05 RX ADMIN — SIMVASTATIN 10 MILLIGRAM(S): 20 TABLET, FILM COATED ORAL at 20:51

## 2017-11-05 RX ADMIN — Medication 650 MILLIGRAM(S): at 08:30

## 2017-11-05 RX ADMIN — HALOPERIDOL DECANOATE 10 MILLIGRAM(S): 100 INJECTION INTRAMUSCULAR at 08:29

## 2017-11-05 RX ADMIN — Medication 1 MILLIGRAM(S): at 08:29

## 2017-11-05 RX ADMIN — DIVALPROEX SODIUM 1250 MILLIGRAM(S): 500 TABLET, DELAYED RELEASE ORAL at 20:51

## 2017-11-05 RX ADMIN — Medication 650 MILLIGRAM(S): at 15:45

## 2017-11-05 RX ADMIN — HALOPERIDOL DECANOATE 15 MILLIGRAM(S): 100 INJECTION INTRAMUSCULAR at 20:51

## 2017-11-05 RX ADMIN — NYSTATIN CREAM 1 APPLICATION(S): 100000 CREAM TOPICAL at 20:51

## 2017-11-05 RX ADMIN — AMLODIPINE BESYLATE 10 MILLIGRAM(S): 2.5 TABLET ORAL at 20:51

## 2017-11-05 RX ADMIN — Medication 1 MILLIGRAM(S): at 20:51

## 2017-11-06 PROCEDURE — 99232 SBSQ HOSP IP/OBS MODERATE 35: CPT

## 2017-11-06 RX ORDER — BACITRACIN ZINC 500 UNIT/G
1 OINTMENT IN PACKET (EA) TOPICAL
Qty: 0 | Refills: 0 | Status: DISCONTINUED | OUTPATIENT
Start: 2017-11-06 | End: 2017-11-27

## 2017-11-06 RX ADMIN — Medication 1 MILLIGRAM(S): at 20:37

## 2017-11-06 RX ADMIN — HALOPERIDOL DECANOATE 10 MILLIGRAM(S): 100 INJECTION INTRAMUSCULAR at 08:34

## 2017-11-06 RX ADMIN — NYSTATIN CREAM 1 APPLICATION(S): 100000 CREAM TOPICAL at 08:35

## 2017-11-06 RX ADMIN — SENNA PLUS 2 TABLET(S): 8.6 TABLET ORAL at 20:37

## 2017-11-06 RX ADMIN — Medication 650 MILLIGRAM(S): at 21:00

## 2017-11-06 RX ADMIN — NYSTATIN CREAM 1 APPLICATION(S): 100000 CREAM TOPICAL at 20:37

## 2017-11-06 RX ADMIN — SIMVASTATIN 10 MILLIGRAM(S): 20 TABLET, FILM COATED ORAL at 20:37

## 2017-11-06 RX ADMIN — AMLODIPINE BESYLATE 10 MILLIGRAM(S): 2.5 TABLET ORAL at 20:36

## 2017-11-06 RX ADMIN — HALOPERIDOL DECANOATE 15 MILLIGRAM(S): 100 INJECTION INTRAMUSCULAR at 20:37

## 2017-11-06 RX ADMIN — Medication 1 APPLICATION(S): at 20:36

## 2017-11-06 RX ADMIN — Medication 650 MILLIGRAM(S): at 08:35

## 2017-11-06 RX ADMIN — Medication 650 MILLIGRAM(S): at 14:00

## 2017-11-06 RX ADMIN — DIVALPROEX SODIUM 1250 MILLIGRAM(S): 500 TABLET, DELAYED RELEASE ORAL at 20:37

## 2017-11-06 RX ADMIN — Medication 1 MILLIGRAM(S): at 08:34

## 2017-11-07 PROCEDURE — 99232 SBSQ HOSP IP/OBS MODERATE 35: CPT

## 2017-11-07 RX ORDER — CLONAZEPAM 1 MG
1 TABLET ORAL
Qty: 0 | Refills: 0 | Status: DISCONTINUED | OUTPATIENT
Start: 2017-11-07 | End: 2017-11-14

## 2017-11-07 RX ADMIN — Medication 1 MILLIGRAM(S): at 20:16

## 2017-11-07 RX ADMIN — NYSTATIN CREAM 1 APPLICATION(S): 100000 CREAM TOPICAL at 09:02

## 2017-11-07 RX ADMIN — SIMVASTATIN 10 MILLIGRAM(S): 20 TABLET, FILM COATED ORAL at 20:16

## 2017-11-07 RX ADMIN — Medication 650 MILLIGRAM(S): at 16:26

## 2017-11-07 RX ADMIN — Medication 650 MILLIGRAM(S): at 08:00

## 2017-11-07 RX ADMIN — AMLODIPINE BESYLATE 10 MILLIGRAM(S): 2.5 TABLET ORAL at 20:16

## 2017-11-07 RX ADMIN — HALOPERIDOL DECANOATE 15 MILLIGRAM(S): 100 INJECTION INTRAMUSCULAR at 20:16

## 2017-11-07 RX ADMIN — NYSTATIN CREAM 1 APPLICATION(S): 100000 CREAM TOPICAL at 21:25

## 2017-11-07 RX ADMIN — Medication 1 MILLIGRAM(S): at 09:01

## 2017-11-07 RX ADMIN — DIVALPROEX SODIUM 1250 MILLIGRAM(S): 500 TABLET, DELAYED RELEASE ORAL at 20:16

## 2017-11-07 RX ADMIN — Medication 1 APPLICATION(S): at 20:16

## 2017-11-07 RX ADMIN — Medication 1 APPLICATION(S): at 09:01

## 2017-11-07 RX ADMIN — HALOPERIDOL DECANOATE 10 MILLIGRAM(S): 100 INJECTION INTRAMUSCULAR at 09:02

## 2017-11-07 RX ADMIN — SENNA PLUS 2 TABLET(S): 8.6 TABLET ORAL at 20:16

## 2017-11-08 ENCOUNTER — APPOINTMENT (OUTPATIENT)
Dept: ORTHOPEDIC SURGERY | Facility: HOSPITAL | Age: 72
End: 2017-11-08

## 2017-11-08 ENCOUNTER — OUTPATIENT (OUTPATIENT)
Dept: OUTPATIENT SERVICES | Facility: HOSPITAL | Age: 72
LOS: 1 days | End: 2017-11-08

## 2017-11-08 VITALS
SYSTOLIC BLOOD PRESSURE: 147 MMHG | HEART RATE: 69 BPM | WEIGHT: 147.05 LBS | DIASTOLIC BLOOD PRESSURE: 84 MMHG | BODY MASS INDEX: 24.47 KG/M2

## 2017-11-08 DIAGNOSIS — Z90.710 ACQUIRED ABSENCE OF BOTH CERVIX AND UTERUS: Chronic | ICD-10-CM

## 2017-11-08 PROCEDURE — 99232 SBSQ HOSP IP/OBS MODERATE 35: CPT

## 2017-11-08 RX ADMIN — DIVALPROEX SODIUM 1250 MILLIGRAM(S): 500 TABLET, DELAYED RELEASE ORAL at 20:26

## 2017-11-08 RX ADMIN — NYSTATIN CREAM 1 APPLICATION(S): 100000 CREAM TOPICAL at 21:18

## 2017-11-08 RX ADMIN — Medication 1 MILLIGRAM(S): at 20:26

## 2017-11-08 RX ADMIN — SIMVASTATIN 10 MILLIGRAM(S): 20 TABLET, FILM COATED ORAL at 20:26

## 2017-11-08 RX ADMIN — HALOPERIDOL DECANOATE 15 MILLIGRAM(S): 100 INJECTION INTRAMUSCULAR at 20:26

## 2017-11-08 RX ADMIN — Medication 1 APPLICATION(S): at 13:11

## 2017-11-08 RX ADMIN — SENNA PLUS 2 TABLET(S): 8.6 TABLET ORAL at 20:26

## 2017-11-08 RX ADMIN — Medication 1 MILLIGRAM(S): at 08:04

## 2017-11-08 RX ADMIN — Medication 650 MILLIGRAM(S): at 08:00

## 2017-11-08 RX ADMIN — AMLODIPINE BESYLATE 10 MILLIGRAM(S): 2.5 TABLET ORAL at 20:26

## 2017-11-08 RX ADMIN — Medication 1 APPLICATION(S): at 20:44

## 2017-11-08 RX ADMIN — Medication 650 MILLIGRAM(S): at 16:10

## 2017-11-08 RX ADMIN — HALOPERIDOL DECANOATE 10 MILLIGRAM(S): 100 INJECTION INTRAMUSCULAR at 08:04

## 2017-11-09 PROCEDURE — 99232 SBSQ HOSP IP/OBS MODERATE 35: CPT

## 2017-11-09 RX ADMIN — Medication 1 APPLICATION(S): at 13:02

## 2017-11-09 RX ADMIN — HALOPERIDOL DECANOATE 15 MILLIGRAM(S): 100 INJECTION INTRAMUSCULAR at 22:24

## 2017-11-09 RX ADMIN — Medication 650 MILLIGRAM(S): at 14:41

## 2017-11-09 RX ADMIN — DIVALPROEX SODIUM 1250 MILLIGRAM(S): 500 TABLET, DELAYED RELEASE ORAL at 22:24

## 2017-11-09 RX ADMIN — Medication 1 MILLIGRAM(S): at 08:30

## 2017-11-09 RX ADMIN — AMLODIPINE BESYLATE 10 MILLIGRAM(S): 2.5 TABLET ORAL at 22:24

## 2017-11-09 RX ADMIN — Medication 650 MILLIGRAM(S): at 07:59

## 2017-11-09 RX ADMIN — Medication 1 MILLIGRAM(S): at 22:24

## 2017-11-09 RX ADMIN — Medication 1 APPLICATION(S): at 22:24

## 2017-11-09 RX ADMIN — HALOPERIDOL DECANOATE 10 MILLIGRAM(S): 100 INJECTION INTRAMUSCULAR at 08:30

## 2017-11-09 RX ADMIN — SIMVASTATIN 10 MILLIGRAM(S): 20 TABLET, FILM COATED ORAL at 22:25

## 2017-11-09 RX ADMIN — SENNA PLUS 2 TABLET(S): 8.6 TABLET ORAL at 22:25

## 2017-11-09 RX ADMIN — NYSTATIN CREAM 1 APPLICATION(S): 100000 CREAM TOPICAL at 22:24

## 2017-11-10 DIAGNOSIS — S52.509P: ICD-10-CM

## 2017-11-10 PROCEDURE — 99232 SBSQ HOSP IP/OBS MODERATE 35: CPT

## 2017-11-10 RX ADMIN — HALOPERIDOL DECANOATE 15 MILLIGRAM(S): 100 INJECTION INTRAMUSCULAR at 21:34

## 2017-11-10 RX ADMIN — Medication 1 MILLIGRAM(S): at 21:34

## 2017-11-10 RX ADMIN — DIVALPROEX SODIUM 1250 MILLIGRAM(S): 500 TABLET, DELAYED RELEASE ORAL at 21:34

## 2017-11-10 RX ADMIN — Medication 1 MILLIGRAM(S): at 08:51

## 2017-11-10 RX ADMIN — NYSTATIN CREAM 1 APPLICATION(S): 100000 CREAM TOPICAL at 21:35

## 2017-11-10 RX ADMIN — SIMVASTATIN 10 MILLIGRAM(S): 20 TABLET, FILM COATED ORAL at 21:35

## 2017-11-10 RX ADMIN — Medication 1 APPLICATION(S): at 21:34

## 2017-11-10 RX ADMIN — AMLODIPINE BESYLATE 10 MILLIGRAM(S): 2.5 TABLET ORAL at 21:34

## 2017-11-10 RX ADMIN — SENNA PLUS 2 TABLET(S): 8.6 TABLET ORAL at 21:34

## 2017-11-10 RX ADMIN — Medication 1 APPLICATION(S): at 08:51

## 2017-11-10 RX ADMIN — HALOPERIDOL DECANOATE 10 MILLIGRAM(S): 100 INJECTION INTRAMUSCULAR at 08:51

## 2017-11-10 RX ADMIN — Medication 650 MILLIGRAM(S): at 08:50

## 2017-11-10 NOTE — CHART NOTE - NSCHARTNOTEFT_GEN_A_CORE
Abrasion on left thumb where it rubs on cast.  Pt seen by ortho 10/8 they said NTD, plan to remove cast in two weeks. Bacitracin and gauze dressing to cushion the area ordered.

## 2017-11-11 RX ADMIN — Medication 650 MILLIGRAM(S): at 21:10

## 2017-11-11 RX ADMIN — DIVALPROEX SODIUM 1250 MILLIGRAM(S): 500 TABLET, DELAYED RELEASE ORAL at 20:54

## 2017-11-11 RX ADMIN — Medication 1 APPLICATION(S): at 20:54

## 2017-11-11 RX ADMIN — Medication 650 MILLIGRAM(S): at 09:02

## 2017-11-11 RX ADMIN — NYSTATIN CREAM 1 APPLICATION(S): 100000 CREAM TOPICAL at 20:54

## 2017-11-11 RX ADMIN — Medication 1 MILLIGRAM(S): at 08:53

## 2017-11-11 RX ADMIN — SENNA PLUS 2 TABLET(S): 8.6 TABLET ORAL at 20:54

## 2017-11-11 RX ADMIN — HALOPERIDOL DECANOATE 10 MILLIGRAM(S): 100 INJECTION INTRAMUSCULAR at 08:53

## 2017-11-11 RX ADMIN — AMLODIPINE BESYLATE 10 MILLIGRAM(S): 2.5 TABLET ORAL at 20:54

## 2017-11-11 RX ADMIN — NYSTATIN CREAM 1 APPLICATION(S): 100000 CREAM TOPICAL at 08:53

## 2017-11-11 RX ADMIN — Medication 650 MILLIGRAM(S): at 15:06

## 2017-11-11 RX ADMIN — Medication 1 APPLICATION(S): at 08:53

## 2017-11-11 RX ADMIN — HALOPERIDOL DECANOATE 15 MILLIGRAM(S): 100 INJECTION INTRAMUSCULAR at 20:54

## 2017-11-11 RX ADMIN — SIMVASTATIN 10 MILLIGRAM(S): 20 TABLET, FILM COATED ORAL at 20:54

## 2017-11-11 RX ADMIN — Medication 1 MILLIGRAM(S): at 20:54

## 2017-11-12 RX ADMIN — HALOPERIDOL DECANOATE 15 MILLIGRAM(S): 100 INJECTION INTRAMUSCULAR at 21:15

## 2017-11-12 RX ADMIN — NYSTATIN CREAM 1 APPLICATION(S): 100000 CREAM TOPICAL at 09:07

## 2017-11-12 RX ADMIN — SENNA PLUS 2 TABLET(S): 8.6 TABLET ORAL at 21:15

## 2017-11-12 RX ADMIN — SIMVASTATIN 10 MILLIGRAM(S): 20 TABLET, FILM COATED ORAL at 21:15

## 2017-11-12 RX ADMIN — Medication 1 MILLIGRAM(S): at 09:07

## 2017-11-12 RX ADMIN — Medication 1 APPLICATION(S): at 21:15

## 2017-11-12 RX ADMIN — Medication 1 APPLICATION(S): at 09:07

## 2017-11-12 RX ADMIN — Medication 1 MILLIGRAM(S): at 21:15

## 2017-11-12 RX ADMIN — Medication 650 MILLIGRAM(S): at 15:36

## 2017-11-12 RX ADMIN — DIVALPROEX SODIUM 1250 MILLIGRAM(S): 500 TABLET, DELAYED RELEASE ORAL at 21:15

## 2017-11-12 RX ADMIN — NYSTATIN CREAM 1 APPLICATION(S): 100000 CREAM TOPICAL at 21:15

## 2017-11-12 RX ADMIN — AMLODIPINE BESYLATE 10 MILLIGRAM(S): 2.5 TABLET ORAL at 21:15

## 2017-11-12 RX ADMIN — HALOPERIDOL DECANOATE 10 MILLIGRAM(S): 100 INJECTION INTRAMUSCULAR at 09:07

## 2017-11-13 PROCEDURE — 99232 SBSQ HOSP IP/OBS MODERATE 35: CPT

## 2017-11-13 RX ADMIN — Medication 1 MILLIGRAM(S): at 20:19

## 2017-11-13 RX ADMIN — SENNA PLUS 2 TABLET(S): 8.6 TABLET ORAL at 20:20

## 2017-11-13 RX ADMIN — Medication 650 MILLIGRAM(S): at 14:03

## 2017-11-13 RX ADMIN — SIMVASTATIN 10 MILLIGRAM(S): 20 TABLET, FILM COATED ORAL at 20:20

## 2017-11-13 RX ADMIN — Medication 650 MILLIGRAM(S): at 20:19

## 2017-11-13 RX ADMIN — Medication 1 MILLIGRAM(S): at 08:43

## 2017-11-13 RX ADMIN — AMLODIPINE BESYLATE 10 MILLIGRAM(S): 2.5 TABLET ORAL at 20:19

## 2017-11-13 RX ADMIN — Medication 1 APPLICATION(S): at 08:43

## 2017-11-13 RX ADMIN — HALOPERIDOL DECANOATE 15 MILLIGRAM(S): 100 INJECTION INTRAMUSCULAR at 20:19

## 2017-11-13 RX ADMIN — Medication 1 APPLICATION(S): at 20:19

## 2017-11-13 RX ADMIN — NYSTATIN CREAM 1 APPLICATION(S): 100000 CREAM TOPICAL at 20:20

## 2017-11-13 RX ADMIN — HALOPERIDOL DECANOATE 10 MILLIGRAM(S): 100 INJECTION INTRAMUSCULAR at 08:43

## 2017-11-13 RX ADMIN — NYSTATIN CREAM 1 APPLICATION(S): 100000 CREAM TOPICAL at 13:09

## 2017-11-13 RX ADMIN — DIVALPROEX SODIUM 1250 MILLIGRAM(S): 500 TABLET, DELAYED RELEASE ORAL at 20:19

## 2017-11-13 RX ADMIN — Medication 650 MILLIGRAM(S): at 08:45

## 2017-11-14 PROCEDURE — 99232 SBSQ HOSP IP/OBS MODERATE 35: CPT

## 2017-11-14 RX ORDER — CLONAZEPAM 1 MG
1 TABLET ORAL
Qty: 0 | Refills: 0 | Status: DISCONTINUED | OUTPATIENT
Start: 2017-11-14 | End: 2017-11-20

## 2017-11-14 RX ADMIN — Medication 650 MILLIGRAM(S): at 15:30

## 2017-11-14 RX ADMIN — SIMVASTATIN 10 MILLIGRAM(S): 20 TABLET, FILM COATED ORAL at 20:36

## 2017-11-14 RX ADMIN — NYSTATIN CREAM 1 APPLICATION(S): 100000 CREAM TOPICAL at 21:56

## 2017-11-14 RX ADMIN — HALOPERIDOL DECANOATE 10 MILLIGRAM(S): 100 INJECTION INTRAMUSCULAR at 09:40

## 2017-11-14 RX ADMIN — Medication 1 APPLICATION(S): at 21:12

## 2017-11-14 RX ADMIN — Medication 1 APPLICATION(S): at 09:40

## 2017-11-14 RX ADMIN — AMLODIPINE BESYLATE 10 MILLIGRAM(S): 2.5 TABLET ORAL at 20:35

## 2017-11-14 RX ADMIN — Medication 1 MILLIGRAM(S): at 20:36

## 2017-11-14 RX ADMIN — DIVALPROEX SODIUM 1250 MILLIGRAM(S): 500 TABLET, DELAYED RELEASE ORAL at 20:36

## 2017-11-14 RX ADMIN — HALOPERIDOL DECANOATE 15 MILLIGRAM(S): 100 INJECTION INTRAMUSCULAR at 20:36

## 2017-11-14 RX ADMIN — Medication 1 MILLIGRAM(S): at 09:40

## 2017-11-14 RX ADMIN — SENNA PLUS 2 TABLET(S): 8.6 TABLET ORAL at 20:36

## 2017-11-15 ENCOUNTER — APPOINTMENT (OUTPATIENT)
Dept: ORTHOPEDIC SURGERY | Facility: HOSPITAL | Age: 72
End: 2017-11-15

## 2017-11-15 LAB
ALBUMIN SERPL ELPH-MCNC: 3.3 G/DL — SIGNIFICANT CHANGE UP (ref 3.3–5)
ALP SERPL-CCNC: 98 U/L — SIGNIFICANT CHANGE UP (ref 40–120)
ALT FLD-CCNC: 7 U/L — SIGNIFICANT CHANGE UP (ref 4–33)
AST SERPL-CCNC: 15 U/L — SIGNIFICANT CHANGE UP (ref 4–32)
BASOPHILS # BLD AUTO: 0.03 K/UL — SIGNIFICANT CHANGE UP (ref 0–0.2)
BASOPHILS NFR BLD AUTO: 0.3 % — SIGNIFICANT CHANGE UP (ref 0–2)
BILIRUB SERPL-MCNC: 0.3 MG/DL — SIGNIFICANT CHANGE UP (ref 0.2–1.2)
BUN SERPL-MCNC: 20 MG/DL — SIGNIFICANT CHANGE UP (ref 7–23)
CALCIUM SERPL-MCNC: 8.8 MG/DL — SIGNIFICANT CHANGE UP (ref 8.4–10.5)
CHLORIDE SERPL-SCNC: 106 MMOL/L — SIGNIFICANT CHANGE UP (ref 98–107)
CO2 SERPL-SCNC: 24 MMOL/L — SIGNIFICANT CHANGE UP (ref 22–31)
CREAT SERPL-MCNC: 0.75 MG/DL — SIGNIFICANT CHANGE UP (ref 0.5–1.3)
EOSINOPHIL # BLD AUTO: 0.23 K/UL — SIGNIFICANT CHANGE UP (ref 0–0.5)
EOSINOPHIL NFR BLD AUTO: 2.5 % — SIGNIFICANT CHANGE UP (ref 0–6)
GLUCOSE SERPL-MCNC: 71 MG/DL — SIGNIFICANT CHANGE UP (ref 70–99)
HCT VFR BLD CALC: 38.9 % — SIGNIFICANT CHANGE UP (ref 34.5–45)
HGB BLD-MCNC: 12.6 G/DL — SIGNIFICANT CHANGE UP (ref 11.5–15.5)
IMM GRANULOCYTES # BLD AUTO: 0.02 # — SIGNIFICANT CHANGE UP
IMM GRANULOCYTES NFR BLD AUTO: 0.2 % — SIGNIFICANT CHANGE UP (ref 0–1.5)
LYMPHOCYTES # BLD AUTO: 3.1 K/UL — SIGNIFICANT CHANGE UP (ref 1–3.3)
LYMPHOCYTES # BLD AUTO: 34.3 % — SIGNIFICANT CHANGE UP (ref 13–44)
MCHC RBC-ENTMCNC: 31.3 PG — SIGNIFICANT CHANGE UP (ref 27–34)
MCHC RBC-ENTMCNC: 32.4 % — SIGNIFICANT CHANGE UP (ref 32–36)
MCV RBC AUTO: 96.8 FL — SIGNIFICANT CHANGE UP (ref 80–100)
MONOCYTES # BLD AUTO: 0.92 K/UL — HIGH (ref 0–0.9)
MONOCYTES NFR BLD AUTO: 10.2 % — SIGNIFICANT CHANGE UP (ref 2–14)
NEUTROPHILS # BLD AUTO: 4.75 K/UL — SIGNIFICANT CHANGE UP (ref 1.8–7.4)
NEUTROPHILS NFR BLD AUTO: 52.5 % — SIGNIFICANT CHANGE UP (ref 43–77)
NRBC # FLD: 0 — SIGNIFICANT CHANGE UP
PLATELET # BLD AUTO: 140 K/UL — LOW (ref 150–400)
PMV BLD: 12.1 FL — SIGNIFICANT CHANGE UP (ref 7–13)
POTASSIUM SERPL-MCNC: 3.6 MMOL/L — SIGNIFICANT CHANGE UP (ref 3.5–5.3)
POTASSIUM SERPL-SCNC: 3.6 MMOL/L — SIGNIFICANT CHANGE UP (ref 3.5–5.3)
PROT SERPL-MCNC: 5.8 G/DL — LOW (ref 6–8.3)
RBC # BLD: 4.02 M/UL — SIGNIFICANT CHANGE UP (ref 3.8–5.2)
RBC # FLD: 15.4 % — HIGH (ref 10.3–14.5)
SODIUM SERPL-SCNC: 143 MMOL/L — SIGNIFICANT CHANGE UP (ref 135–145)
WBC # BLD: 9.05 K/UL — SIGNIFICANT CHANGE UP (ref 3.8–10.5)
WBC # FLD AUTO: 9.05 K/UL — SIGNIFICANT CHANGE UP (ref 3.8–10.5)

## 2017-11-15 PROCEDURE — 99232 SBSQ HOSP IP/OBS MODERATE 35: CPT

## 2017-11-15 RX ADMIN — DIVALPROEX SODIUM 1250 MILLIGRAM(S): 500 TABLET, DELAYED RELEASE ORAL at 21:13

## 2017-11-15 RX ADMIN — HALOPERIDOL DECANOATE 10 MILLIGRAM(S): 100 INJECTION INTRAMUSCULAR at 10:21

## 2017-11-15 RX ADMIN — SIMVASTATIN 10 MILLIGRAM(S): 20 TABLET, FILM COATED ORAL at 21:13

## 2017-11-15 RX ADMIN — AMLODIPINE BESYLATE 10 MILLIGRAM(S): 2.5 TABLET ORAL at 21:13

## 2017-11-15 RX ADMIN — SENNA PLUS 2 TABLET(S): 8.6 TABLET ORAL at 21:13

## 2017-11-15 RX ADMIN — Medication 1 MILLIGRAM(S): at 21:13

## 2017-11-15 RX ADMIN — Medication 650 MILLIGRAM(S): at 07:26

## 2017-11-15 RX ADMIN — Medication 1 MILLIGRAM(S): at 10:20

## 2017-11-15 RX ADMIN — HALOPERIDOL DECANOATE 15 MILLIGRAM(S): 100 INJECTION INTRAMUSCULAR at 21:13

## 2017-11-16 LAB — GLUCOSE BLDC GLUCOMTR-MCNC: 114 MG/DL — HIGH (ref 70–99)

## 2017-11-16 PROCEDURE — 99232 SBSQ HOSP IP/OBS MODERATE 35: CPT

## 2017-11-16 RX ADMIN — SENNA PLUS 2 TABLET(S): 8.6 TABLET ORAL at 21:09

## 2017-11-16 RX ADMIN — Medication 1 APPLICATION(S): at 09:13

## 2017-11-16 RX ADMIN — Medication 1 MILLIGRAM(S): at 09:13

## 2017-11-16 RX ADMIN — NYSTATIN CREAM 1 APPLICATION(S): 100000 CREAM TOPICAL at 21:08

## 2017-11-16 RX ADMIN — NYSTATIN CREAM 1 APPLICATION(S): 100000 CREAM TOPICAL at 09:13

## 2017-11-16 RX ADMIN — Medication 650 MILLIGRAM(S): at 13:29

## 2017-11-16 RX ADMIN — Medication 650 MILLIGRAM(S): at 21:04

## 2017-11-16 RX ADMIN — Medication 1 MILLIGRAM(S): at 21:08

## 2017-11-16 RX ADMIN — AMLODIPINE BESYLATE 10 MILLIGRAM(S): 2.5 TABLET ORAL at 22:00

## 2017-11-16 RX ADMIN — HALOPERIDOL DECANOATE 10 MILLIGRAM(S): 100 INJECTION INTRAMUSCULAR at 09:13

## 2017-11-16 RX ADMIN — DIVALPROEX SODIUM 1250 MILLIGRAM(S): 500 TABLET, DELAYED RELEASE ORAL at 21:08

## 2017-11-16 RX ADMIN — SIMVASTATIN 10 MILLIGRAM(S): 20 TABLET, FILM COATED ORAL at 21:09

## 2017-11-16 RX ADMIN — Medication 1 APPLICATION(S): at 21:08

## 2017-11-16 RX ADMIN — HALOPERIDOL DECANOATE 15 MILLIGRAM(S): 100 INJECTION INTRAMUSCULAR at 21:08

## 2017-11-17 PROCEDURE — 99232 SBSQ HOSP IP/OBS MODERATE 35: CPT

## 2017-11-17 RX ADMIN — AMLODIPINE BESYLATE 10 MILLIGRAM(S): 2.5 TABLET ORAL at 21:39

## 2017-11-17 RX ADMIN — Medication 650 MILLIGRAM(S): at 21:40

## 2017-11-17 RX ADMIN — DIVALPROEX SODIUM 1250 MILLIGRAM(S): 500 TABLET, DELAYED RELEASE ORAL at 21:39

## 2017-11-17 RX ADMIN — Medication 1 APPLICATION(S): at 09:35

## 2017-11-17 RX ADMIN — Medication 1 MILLIGRAM(S): at 09:35

## 2017-11-17 RX ADMIN — HALOPERIDOL DECANOATE 15 MILLIGRAM(S): 100 INJECTION INTRAMUSCULAR at 21:39

## 2017-11-17 RX ADMIN — NYSTATIN CREAM 1 APPLICATION(S): 100000 CREAM TOPICAL at 21:39

## 2017-11-17 RX ADMIN — NYSTATIN CREAM 1 APPLICATION(S): 100000 CREAM TOPICAL at 09:35

## 2017-11-17 RX ADMIN — Medication 1 APPLICATION(S): at 21:39

## 2017-11-17 RX ADMIN — Medication 1 MILLIGRAM(S): at 21:39

## 2017-11-17 RX ADMIN — HALOPERIDOL DECANOATE 10 MILLIGRAM(S): 100 INJECTION INTRAMUSCULAR at 09:35

## 2017-11-17 RX ADMIN — SIMVASTATIN 10 MILLIGRAM(S): 20 TABLET, FILM COATED ORAL at 21:39

## 2017-11-17 RX ADMIN — SENNA PLUS 2 TABLET(S): 8.6 TABLET ORAL at 21:39

## 2017-11-17 RX ADMIN — Medication 650 MILLIGRAM(S): at 09:42

## 2017-11-18 RX ADMIN — Medication 1 MILLIGRAM(S): at 09:12

## 2017-11-18 RX ADMIN — NYSTATIN CREAM 1 APPLICATION(S): 100000 CREAM TOPICAL at 20:50

## 2017-11-18 RX ADMIN — Medication 1 MILLIGRAM(S): at 20:49

## 2017-11-18 RX ADMIN — HALOPERIDOL DECANOATE 10 MILLIGRAM(S): 100 INJECTION INTRAMUSCULAR at 09:12

## 2017-11-18 RX ADMIN — SIMVASTATIN 10 MILLIGRAM(S): 20 TABLET, FILM COATED ORAL at 20:50

## 2017-11-18 RX ADMIN — Medication 1 APPLICATION(S): at 09:30

## 2017-11-18 RX ADMIN — Medication 1 APPLICATION(S): at 20:49

## 2017-11-18 RX ADMIN — NYSTATIN CREAM 1 APPLICATION(S): 100000 CREAM TOPICAL at 09:12

## 2017-11-18 RX ADMIN — SENNA PLUS 2 TABLET(S): 8.6 TABLET ORAL at 20:50

## 2017-11-18 RX ADMIN — Medication 650 MILLIGRAM(S): at 15:23

## 2017-11-18 RX ADMIN — HALOPERIDOL DECANOATE 15 MILLIGRAM(S): 100 INJECTION INTRAMUSCULAR at 20:50

## 2017-11-18 RX ADMIN — DIVALPROEX SODIUM 1250 MILLIGRAM(S): 500 TABLET, DELAYED RELEASE ORAL at 20:49

## 2017-11-18 RX ADMIN — AMLODIPINE BESYLATE 10 MILLIGRAM(S): 2.5 TABLET ORAL at 20:49

## 2017-11-19 LAB — GLUCOSE BLDC GLUCOMTR-MCNC: 91 MG/DL — SIGNIFICANT CHANGE UP (ref 70–99)

## 2017-11-19 RX ADMIN — DIVALPROEX SODIUM 1250 MILLIGRAM(S): 500 TABLET, DELAYED RELEASE ORAL at 21:35

## 2017-11-19 RX ADMIN — Medication 1 MILLIGRAM(S): at 10:10

## 2017-11-19 RX ADMIN — SIMVASTATIN 10 MILLIGRAM(S): 20 TABLET, FILM COATED ORAL at 21:35

## 2017-11-19 RX ADMIN — Medication 1 APPLICATION(S): at 13:05

## 2017-11-19 RX ADMIN — Medication 650 MILLIGRAM(S): at 13:20

## 2017-11-19 RX ADMIN — Medication 1 APPLICATION(S): at 21:34

## 2017-11-19 RX ADMIN — AMLODIPINE BESYLATE 10 MILLIGRAM(S): 2.5 TABLET ORAL at 21:34

## 2017-11-19 RX ADMIN — NYSTATIN CREAM 1 APPLICATION(S): 100000 CREAM TOPICAL at 21:35

## 2017-11-19 RX ADMIN — SENNA PLUS 2 TABLET(S): 8.6 TABLET ORAL at 21:35

## 2017-11-19 RX ADMIN — Medication 1 MILLIGRAM(S): at 21:35

## 2017-11-19 RX ADMIN — HALOPERIDOL DECANOATE 15 MILLIGRAM(S): 100 INJECTION INTRAMUSCULAR at 21:35

## 2017-11-19 RX ADMIN — HALOPERIDOL DECANOATE 10 MILLIGRAM(S): 100 INJECTION INTRAMUSCULAR at 10:10

## 2017-11-20 PROCEDURE — 99232 SBSQ HOSP IP/OBS MODERATE 35: CPT

## 2017-11-20 RX ORDER — CLONAZEPAM 1 MG
1 TABLET ORAL
Qty: 0 | Refills: 0 | Status: DISCONTINUED | OUTPATIENT
Start: 2017-11-20 | End: 2017-11-27

## 2017-11-20 RX ADMIN — SIMVASTATIN 10 MILLIGRAM(S): 20 TABLET, FILM COATED ORAL at 21:38

## 2017-11-20 RX ADMIN — NYSTATIN CREAM 1 APPLICATION(S): 100000 CREAM TOPICAL at 21:38

## 2017-11-20 RX ADMIN — Medication 1 APPLICATION(S): at 09:08

## 2017-11-20 RX ADMIN — DIVALPROEX SODIUM 1250 MILLIGRAM(S): 500 TABLET, DELAYED RELEASE ORAL at 21:37

## 2017-11-20 RX ADMIN — Medication 1 MILLIGRAM(S): at 21:37

## 2017-11-20 RX ADMIN — Medication 650 MILLIGRAM(S): at 08:05

## 2017-11-20 RX ADMIN — Medication 650 MILLIGRAM(S): at 17:00

## 2017-11-20 RX ADMIN — HALOPERIDOL DECANOATE 10 MILLIGRAM(S): 100 INJECTION INTRAMUSCULAR at 08:04

## 2017-11-20 RX ADMIN — SENNA PLUS 2 TABLET(S): 8.6 TABLET ORAL at 21:38

## 2017-11-20 RX ADMIN — Medication 1 APPLICATION(S): at 21:37

## 2017-11-20 RX ADMIN — AMLODIPINE BESYLATE 10 MILLIGRAM(S): 2.5 TABLET ORAL at 21:37

## 2017-11-20 RX ADMIN — Medication 1 MILLIGRAM(S): at 08:04

## 2017-11-20 RX ADMIN — HALOPERIDOL DECANOATE 15 MILLIGRAM(S): 100 INJECTION INTRAMUSCULAR at 21:37

## 2017-11-20 RX ADMIN — NYSTATIN CREAM 1 APPLICATION(S): 100000 CREAM TOPICAL at 09:08

## 2017-11-21 PROCEDURE — 99232 SBSQ HOSP IP/OBS MODERATE 35: CPT

## 2017-11-21 RX ADMIN — DIVALPROEX SODIUM 1250 MILLIGRAM(S): 500 TABLET, DELAYED RELEASE ORAL at 20:53

## 2017-11-21 RX ADMIN — Medication 1 MILLIGRAM(S): at 20:53

## 2017-11-21 RX ADMIN — NYSTATIN CREAM 1 APPLICATION(S): 100000 CREAM TOPICAL at 20:53

## 2017-11-21 RX ADMIN — AMLODIPINE BESYLATE 10 MILLIGRAM(S): 2.5 TABLET ORAL at 20:53

## 2017-11-21 RX ADMIN — Medication 1 MILLIGRAM(S): at 09:22

## 2017-11-21 RX ADMIN — SENNA PLUS 2 TABLET(S): 8.6 TABLET ORAL at 20:53

## 2017-11-21 RX ADMIN — HALOPERIDOL DECANOATE 15 MILLIGRAM(S): 100 INJECTION INTRAMUSCULAR at 20:53

## 2017-11-21 RX ADMIN — Medication 1 APPLICATION(S): at 20:53

## 2017-11-21 RX ADMIN — Medication 1 APPLICATION(S): at 09:24

## 2017-11-21 RX ADMIN — SIMVASTATIN 10 MILLIGRAM(S): 20 TABLET, FILM COATED ORAL at 20:53

## 2017-11-21 RX ADMIN — Medication 650 MILLIGRAM(S): at 20:54

## 2017-11-21 RX ADMIN — HALOPERIDOL DECANOATE 10 MILLIGRAM(S): 100 INJECTION INTRAMUSCULAR at 09:22

## 2017-11-22 ENCOUNTER — OUTPATIENT (OUTPATIENT)
Dept: OUTPATIENT SERVICES | Facility: HOSPITAL | Age: 72
LOS: 1 days | End: 2017-11-22
Payer: MEDICARE

## 2017-11-22 ENCOUNTER — APPOINTMENT (OUTPATIENT)
Dept: ORTHOPEDIC SURGERY | Facility: HOSPITAL | Age: 72
End: 2017-11-22

## 2017-11-22 ENCOUNTER — APPOINTMENT (OUTPATIENT)
Dept: RADIOLOGY | Facility: HOSPITAL | Age: 72
End: 2017-11-22

## 2017-11-22 VITALS
DIASTOLIC BLOOD PRESSURE: 72 MMHG | BODY MASS INDEX: 24.49 KG/M2 | HEIGHT: 65 IN | HEART RATE: 63 BPM | SYSTOLIC BLOOD PRESSURE: 114 MMHG | WEIGHT: 147 LBS

## 2017-11-22 DIAGNOSIS — Z90.710 ACQUIRED ABSENCE OF BOTH CERVIX AND UTERUS: Chronic | ICD-10-CM

## 2017-11-22 PROCEDURE — 73110 X-RAY EXAM OF WRIST: CPT | Mod: 26,LT

## 2017-11-22 PROCEDURE — 99232 SBSQ HOSP IP/OBS MODERATE 35: CPT

## 2017-11-22 RX ADMIN — AMLODIPINE BESYLATE 10 MILLIGRAM(S): 2.5 TABLET ORAL at 20:45

## 2017-11-22 RX ADMIN — Medication 1 APPLICATION(S): at 20:45

## 2017-11-22 RX ADMIN — SENNA PLUS 2 TABLET(S): 8.6 TABLET ORAL at 20:45

## 2017-11-22 RX ADMIN — HALOPERIDOL DECANOATE 15 MILLIGRAM(S): 100 INJECTION INTRAMUSCULAR at 20:45

## 2017-11-22 RX ADMIN — DIVALPROEX SODIUM 1250 MILLIGRAM(S): 500 TABLET, DELAYED RELEASE ORAL at 20:45

## 2017-11-22 RX ADMIN — Medication 1 APPLICATION(S): at 09:15

## 2017-11-22 RX ADMIN — SIMVASTATIN 10 MILLIGRAM(S): 20 TABLET, FILM COATED ORAL at 20:45

## 2017-11-22 RX ADMIN — Medication 1 MILLIGRAM(S): at 20:45

## 2017-11-22 RX ADMIN — HALOPERIDOL DECANOATE 10 MILLIGRAM(S): 100 INJECTION INTRAMUSCULAR at 09:15

## 2017-11-22 RX ADMIN — Medication 1 MILLIGRAM(S): at 09:15

## 2017-11-23 RX ADMIN — HALOPERIDOL DECANOATE 10 MILLIGRAM(S): 100 INJECTION INTRAMUSCULAR at 08:54

## 2017-11-23 RX ADMIN — DIVALPROEX SODIUM 1250 MILLIGRAM(S): 500 TABLET, DELAYED RELEASE ORAL at 21:10

## 2017-11-23 RX ADMIN — Medication 1 APPLICATION(S): at 21:09

## 2017-11-23 RX ADMIN — SIMVASTATIN 10 MILLIGRAM(S): 20 TABLET, FILM COATED ORAL at 21:10

## 2017-11-23 RX ADMIN — Medication 1 APPLICATION(S): at 08:54

## 2017-11-23 RX ADMIN — Medication 650 MILLIGRAM(S): at 16:36

## 2017-11-23 RX ADMIN — AMLODIPINE BESYLATE 10 MILLIGRAM(S): 2.5 TABLET ORAL at 21:09

## 2017-11-23 RX ADMIN — Medication 1 MILLIGRAM(S): at 21:09

## 2017-11-23 RX ADMIN — HALOPERIDOL DECANOATE 15 MILLIGRAM(S): 100 INJECTION INTRAMUSCULAR at 21:10

## 2017-11-23 RX ADMIN — SENNA PLUS 2 TABLET(S): 8.6 TABLET ORAL at 21:10

## 2017-11-23 RX ADMIN — Medication 1 MILLIGRAM(S): at 08:54

## 2017-11-24 PROCEDURE — 99232 SBSQ HOSP IP/OBS MODERATE 35: CPT

## 2017-11-24 RX ORDER — TUBERCULIN PURIFIED PROTEIN DERIVATIVE 5 [IU]/.1ML
5 INJECTION, SOLUTION INTRADERMAL ONCE
Qty: 0 | Refills: 0 | Status: COMPLETED | OUTPATIENT
Start: 2017-11-24 | End: 2017-11-24

## 2017-11-24 RX ADMIN — HALOPERIDOL DECANOATE 10 MILLIGRAM(S): 100 INJECTION INTRAMUSCULAR at 09:16

## 2017-11-24 RX ADMIN — TUBERCULIN PURIFIED PROTEIN DERIVATIVE 5 UNIT(S): 5 INJECTION, SOLUTION INTRADERMAL at 21:10

## 2017-11-24 RX ADMIN — SENNA PLUS 2 TABLET(S): 8.6 TABLET ORAL at 20:54

## 2017-11-24 RX ADMIN — AMLODIPINE BESYLATE 10 MILLIGRAM(S): 2.5 TABLET ORAL at 20:54

## 2017-11-24 RX ADMIN — Medication 650 MILLIGRAM(S): at 20:00

## 2017-11-24 RX ADMIN — SIMVASTATIN 10 MILLIGRAM(S): 20 TABLET, FILM COATED ORAL at 20:54

## 2017-11-24 RX ADMIN — Medication 1 APPLICATION(S): at 09:16

## 2017-11-24 RX ADMIN — Medication 1 MILLIGRAM(S): at 09:16

## 2017-11-24 RX ADMIN — Medication 1 MILLIGRAM(S): at 20:54

## 2017-11-24 RX ADMIN — HALOPERIDOL DECANOATE 15 MILLIGRAM(S): 100 INJECTION INTRAMUSCULAR at 20:54

## 2017-11-24 RX ADMIN — DIVALPROEX SODIUM 1250 MILLIGRAM(S): 500 TABLET, DELAYED RELEASE ORAL at 20:54

## 2017-11-24 RX ADMIN — Medication 1 APPLICATION(S): at 20:54

## 2017-11-25 RX ADMIN — AMLODIPINE BESYLATE 10 MILLIGRAM(S): 2.5 TABLET ORAL at 20:02

## 2017-11-25 RX ADMIN — SENNA PLUS 2 TABLET(S): 8.6 TABLET ORAL at 20:02

## 2017-11-25 RX ADMIN — Medication 1 APPLICATION(S): at 09:41

## 2017-11-25 RX ADMIN — HALOPERIDOL DECANOATE 10 MILLIGRAM(S): 100 INJECTION INTRAMUSCULAR at 09:41

## 2017-11-25 RX ADMIN — SIMVASTATIN 10 MILLIGRAM(S): 20 TABLET, FILM COATED ORAL at 20:02

## 2017-11-25 RX ADMIN — DIVALPROEX SODIUM 1250 MILLIGRAM(S): 500 TABLET, DELAYED RELEASE ORAL at 20:02

## 2017-11-25 RX ADMIN — Medication 1 APPLICATION(S): at 20:02

## 2017-11-25 RX ADMIN — HALOPERIDOL DECANOATE 15 MILLIGRAM(S): 100 INJECTION INTRAMUSCULAR at 20:02

## 2017-11-25 RX ADMIN — Medication 650 MILLIGRAM(S): at 19:45

## 2017-11-25 RX ADMIN — Medication 1 MILLIGRAM(S): at 09:41

## 2017-11-25 RX ADMIN — Medication 1 MILLIGRAM(S): at 20:02

## 2017-11-26 RX ADMIN — TUBERCULIN PURIFIED PROTEIN DERIVATIVE 5 UNIT(S): 5 INJECTION, SOLUTION INTRADERMAL at 21:15

## 2017-11-26 RX ADMIN — Medication 1 MILLIGRAM(S): at 20:12

## 2017-11-26 RX ADMIN — Medication 650 MILLIGRAM(S): at 18:15

## 2017-11-26 RX ADMIN — Medication 1 APPLICATION(S): at 09:26

## 2017-11-26 RX ADMIN — Medication 1 MILLIGRAM(S): at 09:26

## 2017-11-26 RX ADMIN — SENNA PLUS 2 TABLET(S): 8.6 TABLET ORAL at 20:13

## 2017-11-26 RX ADMIN — HALOPERIDOL DECANOATE 10 MILLIGRAM(S): 100 INJECTION INTRAMUSCULAR at 09:26

## 2017-11-26 RX ADMIN — Medication 1 APPLICATION(S): at 20:12

## 2017-11-26 RX ADMIN — SIMVASTATIN 10 MILLIGRAM(S): 20 TABLET, FILM COATED ORAL at 20:13

## 2017-11-26 RX ADMIN — DIVALPROEX SODIUM 1250 MILLIGRAM(S): 500 TABLET, DELAYED RELEASE ORAL at 20:12

## 2017-11-26 RX ADMIN — AMLODIPINE BESYLATE 10 MILLIGRAM(S): 2.5 TABLET ORAL at 20:12

## 2017-11-26 RX ADMIN — HALOPERIDOL DECANOATE 15 MILLIGRAM(S): 100 INJECTION INTRAMUSCULAR at 20:13

## 2017-11-27 DIAGNOSIS — S62.102P FRACTURE OF UNSPECIFIED CARPAL BONE, LEFT WRIST, SUBSEQUENT ENCOUNTER FOR FRACTURE WITH MALUNION: ICD-10-CM

## 2017-11-27 PROCEDURE — 99232 SBSQ HOSP IP/OBS MODERATE 35: CPT

## 2017-11-27 RX ORDER — CLONAZEPAM 1 MG
1 TABLET ORAL
Qty: 0 | Refills: 0 | Status: DISCONTINUED | OUTPATIENT
Start: 2017-11-27 | End: 2017-11-29

## 2017-11-27 RX ADMIN — Medication 650 MILLIGRAM(S): at 16:46

## 2017-11-27 RX ADMIN — AMLODIPINE BESYLATE 10 MILLIGRAM(S): 2.5 TABLET ORAL at 21:24

## 2017-11-27 RX ADMIN — Medication 1 MILLIGRAM(S): at 08:55

## 2017-11-27 RX ADMIN — HALOPERIDOL DECANOATE 15 MILLIGRAM(S): 100 INJECTION INTRAMUSCULAR at 21:24

## 2017-11-27 RX ADMIN — HALOPERIDOL DECANOATE 10 MILLIGRAM(S): 100 INJECTION INTRAMUSCULAR at 08:55

## 2017-11-27 RX ADMIN — SIMVASTATIN 10 MILLIGRAM(S): 20 TABLET, FILM COATED ORAL at 21:24

## 2017-11-27 RX ADMIN — SENNA PLUS 2 TABLET(S): 8.6 TABLET ORAL at 21:24

## 2017-11-27 RX ADMIN — DIVALPROEX SODIUM 1250 MILLIGRAM(S): 500 TABLET, DELAYED RELEASE ORAL at 21:24

## 2017-11-27 RX ADMIN — Medication 650 MILLIGRAM(S): at 08:56

## 2017-11-27 RX ADMIN — Medication 1 MILLIGRAM(S): at 21:24

## 2017-11-28 PROCEDURE — 99232 SBSQ HOSP IP/OBS MODERATE 35: CPT

## 2017-11-28 PROCEDURE — 99233 SBSQ HOSP IP/OBS HIGH 50: CPT

## 2017-11-28 RX ORDER — AMLODIPINE BESYLATE 2.5 MG/1
5 TABLET ORAL AT BEDTIME
Qty: 0 | Refills: 0 | Status: DISCONTINUED | OUTPATIENT
Start: 2017-11-28 | End: 2017-12-20

## 2017-11-28 RX ADMIN — Medication 1 MILLIGRAM(S): at 20:46

## 2017-11-28 RX ADMIN — Medication 1 MILLIGRAM(S): at 08:23

## 2017-11-28 RX ADMIN — HALOPERIDOL DECANOATE 15 MILLIGRAM(S): 100 INJECTION INTRAMUSCULAR at 20:46

## 2017-11-28 RX ADMIN — SIMVASTATIN 10 MILLIGRAM(S): 20 TABLET, FILM COATED ORAL at 20:46

## 2017-11-28 RX ADMIN — DIVALPROEX SODIUM 1250 MILLIGRAM(S): 500 TABLET, DELAYED RELEASE ORAL at 20:46

## 2017-11-28 RX ADMIN — HALOPERIDOL DECANOATE 10 MILLIGRAM(S): 100 INJECTION INTRAMUSCULAR at 08:23

## 2017-11-28 RX ADMIN — AMLODIPINE BESYLATE 5 MILLIGRAM(S): 2.5 TABLET ORAL at 20:46

## 2017-11-28 RX ADMIN — SENNA PLUS 2 TABLET(S): 8.6 TABLET ORAL at 20:46

## 2017-11-28 RX ADMIN — Medication 650 MILLIGRAM(S): at 10:45

## 2017-11-28 NOTE — PROGRESS NOTE ADULT - SUBJECTIVE AND OBJECTIVE BOX
CC/Reason for Consult: F/u L arm fracture     SUBJECTIVE / OVERNIGHT EVENTS:     Patient reports some L arm pain that is ameliorated w/ her current pain regimen. She denies numbness or weakness in her fingers.       MEDICATIONS  (STANDING):  amLODIPine   Tablet 10 milliGRAM(s) Oral at bedtime  clonazePAM Tablet 1 milliGRAM(s) Oral two times a day  diVALproex ER 1250 milliGRAM(s) Oral at bedtime  haloperidol     Tablet 15 milliGRAM(s) Oral at bedtime  haloperidol     Tablet 10 milliGRAM(s) Oral daily  nystatin Powder 1 Application(s) Topical two times a day  simvastatin 10 milliGRAM(s) Oral at bedtime    MEDICATIONS  (PRN):  acetaminophen   Tablet 650 milliGRAM(s) Oral every 6 hours PRN mild-moderate pain  haloperidol     Tablet 5 milliGRAM(s) Oral every 6 hours PRN agitation  haloperidol    Injectable 5 milliGRAM(s) IntraMuscular once PRN WHEN PT REFUSES PO HALDOL AS PER COURT ORDER  haloperidol    Injectable 5 milliGRAM(s) IntraMuscular once PRN agitation  LORazepam   Injectable 2 milliGRAM(s) IntraMuscular once PRN wehn pt refuses po depakote as per court order  oxyCODONE    IR 5 milliGRAM(s) Oral every 4 hours PRN Severe Pain (7 - 10)    Vital Signs Last 24 Hrs  T(C): 37.2 (24 Oct 2017 15:25), Max: 37.2 (24 Oct 2017 15:25)  T(F): 99 (24 Oct 2017 15:25), Max: 99 (24 Oct 2017 15:25)  HR: 63 (24 Oct 2017 08:09) (63 - 63)  BP: 127/68 (24 Oct 2017 08:09) (127/68 - 127/68)  RR: 18 (24 Oct 2017 08:09) (18 - 18)    PHYSICAL EXAM:  GENERAL: NAD,   HEAD:  Atraumatic, Normocephalic  EYES: EOMI, conjunctiva and sclera clear  NECK: Supple, No JVD  CHEST/LUNG: Clear to auscultation bilaterally; No wheeze  HEART: Regular rate and rhythm; No murmurs, rubs, or gallops  ABDOMEN: Soft, Nontender, Nondistended; Bowel sounds present  EXTREMITIES:  2+ Peripheral Pulses, No clubbing, cyanosis, or edema  PSYCH: AAOx2  NEUROLOGY: non-focal, patient w/ LUE cast. Moving all fingers w/o difficulty. Sensation intact  SKIN: No rashes . Scattered bruises     RADIOLOGY & ADDITIONAL TESTS:    Imaging Personally Reviewed:    Consultant(s) Notes Reviewed:      Care Discussed with Consultants/Other Providers: Dr. Kendall from psychiatry
72 year old female with psych disorder now with exacerbation, highly agitated, aggressive, combative with a hx of HTN noncompliant with blood pressure meds sent to ED twice for hypertensive crisis, s/p 10/10/2017 court approved medications over objection.  Pt physically very active and aggressive.  Pt with LUE FX.  Pt has pulled initially splint, later cast off.  Pt also with scattered bruises on extremities.  Pt often slams room door shut.    Today, Pt walking down long hallway and told staff she feels tired and sat down on floor.  Pt did not fall but lowered herself carefully to floor.  Pt now in bed.  Pt denies pain in extremities or back.
72 year old female with psych disorder now with exacerbation.  Pt highly aggressive and combative chasing staff out of room, kicking, flinging bilateral arms in air and screaming loudly.  Pt s/p court ordered meds over objection on 10/03/2017 getting IM Prolixin, prn Lorazepam.  Yesterday after IM Prolixin, pt highly agitated.  Blood pressure obtained: 194/142 but pt not cooperative.  Pt in no distress running around room screaming.  Pt received IM Lorazepam.  Later BPs after one hour 159/117, 150/125 but at 8 /87 80.  Pt only takes evening oral medications. Pt will not take po at other times.    Pt with four small 1.5 cm2 left hip ecchymosis and 5 cm2 left forarm ecchymosis yesterday.  Pt had blood drawn 10/04 as well as multiple IMs.  Pt also very mobile when agitated and could be hurting self with self trauma.    Today, left forarm ecchymosis now much larger starting at upper arm as well as extensive portion of forarm.  Pt raising and swinging both arms in air as well as kicking.  Pt not favoring or splinting left upper extremity.  Pt with larger 3 cm2 left hip ecchymosis.  Pt kicking with left lower extremity.    Left forarm  ecchymosis now enlarged.  Doubt fx but possible self trauma vs bleeding from possible phlebotomy or IM injections.    Left hip with small ecchymosis.  Pt ambulating very well and kicking with both legs.  Doubt left hip fx.    Staff concerned about self trauma and fractures.  Plan:  send to ED medicated form xrays of left upper extremity and left hip.    HTN:  will increase PM dose of Amlodipine from 5 mg to 10 mg hs for better blood pressure control.
CC/Reason for Consult: Bruising noted on right upper extremity     SUBJECTIVE / OVERNIGHT EVENTS: Patient is acutely psychotic and unable to cooperate with history or physical. Report obtained from staff. Per staff, patient is agitated and difficult to manage. She was noted to have some bruising on right upper extremity. Per staff no known trauma or fall. Patient however was noted to bang her extremity against the wall. She also has been to ED a few times and it is unknown if any trauma might have occurred in the ED. Patient is not actively complaining of pain. She was noted to move her right extremity without difficulties.     MEDICATIONS  (STANDING):  amLODIPine   Tablet 10 milliGRAM(s) Oral at bedtime  clonazePAM Tablet 1 milliGRAM(s) Oral two times a day  diVALproex  milliGRAM(s) Oral two times a day  haloperidol     Tablet 10 milliGRAM(s) Oral two times a day  nystatin Powder 1 Application(s) Topical two times a day  simvastatin 10 milliGRAM(s) Oral at bedtime    MEDICATIONS  (PRN):  acetaminophen   Tablet 650 milliGRAM(s) Oral every 6 hours PRN mild-moderate pain  haloperidol     Tablet 5 milliGRAM(s) Oral every 6 hours PRN agitation  haloperidol    Injectable 5 milliGRAM(s) IntraMuscular once PRN WHEN PT REFUSES PO HALDOL AS PER COURT ORDER  haloperidol    Injectable 5 milliGRAM(s) IntraMuscular once PRN agitation  LORazepam   Injectable 2 milliGRAM(s) IntraMuscular once PRN Agitation  LORazepam   Injectable 2 milliGRAM(s) IntraMuscular once PRN wehn pt refuses po depakote as per court order  oxyCODONE    IR 5 milliGRAM(s) Oral every 4 hours PRN Severe Pain (7 - 10)    Vital Signs Last 24 Hrs  T(C): 37.4 (11 Oct 2017 15:55), Max: 37.4 (11 Oct 2017 15:55)  T(F): 99.3 (11 Oct 2017 15:55), Max: 99.3 (11 Oct 2017 15:55)  HR: 99 (12 Oct 2017 10:37) (71 - 99)  BP: 159/54 (11 Oct 2017 20:34) (159/54 - 159/54)  BP(mean): --  RR: 20 (12 Oct 2017 10:37) (20 - 21)  SpO2: 98% (12 Oct 2017 10:37) (98% - 98%)      PHYSICAL EXAM:  Not cooperative with exam. Limited exam due to acute psychosis  Gen: not in any acute distress.   RUE - bruises noted on RUE. No hematoma palpated. Patient refused ROM exam but she was able to move her arms without difficulties. No surrounding edema or erythema appreciated. LUE in cast.   Psych - agitated    LABS:                        13.7   8.57  )-----------( 151      ( 12 Oct 2017 10:35 )             40.7     10-12    143  |  101  |  6<L>  ----------------------------<  82  3.4<L>   |  25  |  0.68    Ca    9.0      12 Oct 2017 10:35    TPro  6.3  /  Alb  3.1<L>  /  TBili  0.7  /  DBili  x   /  AST  30  /  ALT  21  /  AlkPhos  97  10-12    RADIOLOGY & ADDITIONAL TESTS: < from: Xray Wrist 2 Views, Left (10.10.17 @ 20:09) >  Status postreduction for distal radial ulnar fracture with improved alignment.    Imaging Personally Reviewed:    Consultant(s) Notes Reviewed:      Care Discussed with Consultants/Other Providers:
CC/Reason for Consult: dizziness    SUBJECTIVE / OVERNIGHT EVENTS: Patient reports that she is dizzy when she gets up, and when she rolls over in bed.  Feels liek room is spinning.  Denies n/v. Pt thinks all her medications are too strong and are causing dizziness, she is not sure which medication is causing dizziness.    MEDICATIONS  (STANDING):  amLODIPine   Tablet 10 milliGRAM(s) Oral at bedtime  clonazePAM Tablet 1 milliGRAM(s) Oral two times a day  diVALproex ER 1250 milliGRAM(s) Oral at bedtime  haloperidol     Tablet 15 milliGRAM(s) Oral at bedtime  haloperidol     Tablet 10 milliGRAM(s) Oral daily  senna 2 Tablet(s) Oral at bedtime  simvastatin 10 milliGRAM(s) Oral at bedtime    MEDICATIONS  (PRN):  acetaminophen   Tablet 650 milliGRAM(s) Oral every 6 hours PRN mild-moderate pain  haloperidol     Tablet 5 milliGRAM(s) Oral every 6 hours PRN agitation  haloperidol    Injectable 5 milliGRAM(s) IntraMuscular once PRN WHEN PT REFUSES PO HALDOL AS PER COURT ORDER  haloperidol    Injectable 5 milliGRAM(s) IntraMuscular once PRN agitation  LORazepam   Injectable 2 milliGRAM(s) IntraMuscular once PRN when pt refuses po depakote as per court order      Vital Signs Last 24 Hrs  T(C): 36.6 (28 Nov 2017 15:32), Max: 36.6 (28 Nov 2017 15:32)  T(F): 97.9 (28 Nov 2017 15:32), Max: 97.9 (28 Nov 2017 15:32)  HR: 52  BP: 126/53  RR: 16 (28 Nov 2017 05:46) (16 - 17)    PHYSICAL EXAM:  GENERAL: NAD, well-developed  HEAD:  Atraumatic, Normocephalic  EYES: EOMI, PERRLA, conjunctiva and sclera clear  NECK: Supple, No JVD  CHEST/LUNG: Clear to auscultation bilaterally; No wheeze  HEART: Regular rate and rhythm; No murmurs, rubs, or gallops  ABDOMEN: Soft, Nontender, Nondistended; Bowel sounds present  EXTREMITIES:  2+ Peripheral Pulses, No clubbing, cyanosis, or edema  PSYCH: AAOx3  NEUROLOGY: non-focal  SKIN: No rashes or lesions    Care Discussed with Consultants/Other Providers: Dr Kendall

## 2017-11-28 NOTE — PROGRESS NOTE ADULT - ASSESSMENT
73 yo F PMHx HTN, HLD, schizophrenia, admitted to [facility] with exacerbation, agitation and aggressive,  w/  episodes of hypertension in the setting of noncompliance requiring transfer to the ED, now s/p 10/10/2017 court approved medications over objection, w/ course further c/b L distal radial and ulnar fractures s/p casting.     Left distal radial and ulnar fractures s/p casting- Per chart review  during course pt has been very active and aggressive. Initially she has pulled off her splint and later on her cast, which has now been re-casted. Pt appears calm today w/ cast intact  -c/w pain management w/ Tylenol and oxycodone PRN. Patient has been using oxycodone 5mg about 2x per day. If remains stable would transition to Tylenol only.   -ortho's note from 10/10/17 reviewed and appreciated. Pt will need to follow up w/ Dr. Marroquin from ortho the week of 10/30/17. Please call [phone] to arrange appointment.     HTN  -c/w amlodipine 10mg    HLD  -c/w statin     Schizophrenia/Psychosis  - management per psychiatry team
71 y/o F with schizophrenia and acute psychosis with recent distal radial ulnar fracture s/p reduction and cast placement. Patient needed multiple ED visits due to damage to the cast and remains psychotic and uncooperative
72 F schizophrenia, HLD, hypertension and dizziness    Plan:  Dizziness: Patient has had psych meds recently titrated upward and sx may be due to additive effect of haldol, depakote, clonazepam. D/w Dr alexandra some med doses will be reduced.  Agree Ok to reduce amlodipine to 5mg daily and monitor BP.  Pulse low when measured today, if dizziness persists, check EKG, TSH.    HTN: Amlodipin eto 5mg daily as above    HLD: continue statin    Schizophrenia: management per primary team
PE:    WDWN FEMALE NAD, SKIN WARM AND DRY, HEAD NC/AT,  MOUTH PINK AND MOIST, COR RRR S1S2, LUNGS CLEAR, LUE CAST INTACT.  RUE FROM AND INTACT.  BILATERAL LOWER EXTREMITIES FULL RANGE OF MOTION WITHOUT PAIN.    PT SAT DOWN ON FLOOR WITHOUT SELF TRAUMA.  PT TOLD TO TELL STAFF WHEN TIRED AND TO SIT IN CHAIRS AND NOT ON FLOOR.
SEE above

## 2017-11-29 PROCEDURE — 99232 SBSQ HOSP IP/OBS MODERATE 35: CPT

## 2017-11-29 RX ORDER — CLONAZEPAM 1 MG
1 TABLET ORAL AT BEDTIME
Qty: 0 | Refills: 0 | Status: DISCONTINUED | OUTPATIENT
Start: 2017-11-29 | End: 2017-12-06

## 2017-11-29 RX ORDER — HALOPERIDOL DECANOATE 100 MG/ML
50 INJECTION INTRAMUSCULAR
Qty: 0 | Refills: 0 | Status: DISCONTINUED | OUTPATIENT
Start: 2017-11-29 | End: 2017-12-20

## 2017-11-29 RX ORDER — CLONAZEPAM 1 MG
0.5 TABLET ORAL DAILY
Qty: 0 | Refills: 0 | Status: DISCONTINUED | OUTPATIENT
Start: 2017-11-30 | End: 2017-12-06

## 2017-11-29 RX ADMIN — Medication 650 MILLIGRAM(S): at 16:59

## 2017-11-29 RX ADMIN — AMLODIPINE BESYLATE 5 MILLIGRAM(S): 2.5 TABLET ORAL at 20:58

## 2017-11-29 RX ADMIN — Medication 1 MILLIGRAM(S): at 20:58

## 2017-11-29 RX ADMIN — DIVALPROEX SODIUM 1250 MILLIGRAM(S): 500 TABLET, DELAYED RELEASE ORAL at 20:58

## 2017-11-29 RX ADMIN — SIMVASTATIN 10 MILLIGRAM(S): 20 TABLET, FILM COATED ORAL at 20:58

## 2017-11-29 RX ADMIN — Medication 1 MILLIGRAM(S): at 09:45

## 2017-11-29 RX ADMIN — SENNA PLUS 2 TABLET(S): 8.6 TABLET ORAL at 20:58

## 2017-11-29 RX ADMIN — HALOPERIDOL DECANOATE 10 MILLIGRAM(S): 100 INJECTION INTRAMUSCULAR at 09:45

## 2017-11-29 RX ADMIN — HALOPERIDOL DECANOATE 15 MILLIGRAM(S): 100 INJECTION INTRAMUSCULAR at 20:58

## 2017-11-30 PROCEDURE — 99232 SBSQ HOSP IP/OBS MODERATE 35: CPT

## 2017-11-30 RX ADMIN — Medication 1 MILLIGRAM(S): at 20:28

## 2017-11-30 RX ADMIN — AMLODIPINE BESYLATE 5 MILLIGRAM(S): 2.5 TABLET ORAL at 20:28

## 2017-11-30 RX ADMIN — DIVALPROEX SODIUM 1250 MILLIGRAM(S): 500 TABLET, DELAYED RELEASE ORAL at 20:28

## 2017-11-30 RX ADMIN — SENNA PLUS 2 TABLET(S): 8.6 TABLET ORAL at 20:28

## 2017-11-30 RX ADMIN — HALOPERIDOL DECANOATE 10 MILLIGRAM(S): 100 INJECTION INTRAMUSCULAR at 08:16

## 2017-11-30 RX ADMIN — HALOPERIDOL DECANOATE 15 MILLIGRAM(S): 100 INJECTION INTRAMUSCULAR at 20:28

## 2017-11-30 RX ADMIN — Medication 0.5 MILLIGRAM(S): at 08:16

## 2017-11-30 RX ADMIN — Medication 650 MILLIGRAM(S): at 20:30

## 2017-11-30 RX ADMIN — HALOPERIDOL DECANOATE 50 MILLIGRAM(S): 100 INJECTION INTRAMUSCULAR at 09:58

## 2017-11-30 RX ADMIN — SIMVASTATIN 10 MILLIGRAM(S): 20 TABLET, FILM COATED ORAL at 20:28

## 2017-12-01 PROCEDURE — 99232 SBSQ HOSP IP/OBS MODERATE 35: CPT

## 2017-12-01 RX ADMIN — SENNA PLUS 2 TABLET(S): 8.6 TABLET ORAL at 20:49

## 2017-12-01 RX ADMIN — HALOPERIDOL DECANOATE 10 MILLIGRAM(S): 100 INJECTION INTRAMUSCULAR at 08:35

## 2017-12-01 RX ADMIN — Medication 0.5 MILLIGRAM(S): at 08:35

## 2017-12-01 RX ADMIN — DIVALPROEX SODIUM 1250 MILLIGRAM(S): 500 TABLET, DELAYED RELEASE ORAL at 20:49

## 2017-12-01 RX ADMIN — AMLODIPINE BESYLATE 5 MILLIGRAM(S): 2.5 TABLET ORAL at 20:49

## 2017-12-01 RX ADMIN — HALOPERIDOL DECANOATE 15 MILLIGRAM(S): 100 INJECTION INTRAMUSCULAR at 20:49

## 2017-12-01 RX ADMIN — SIMVASTATIN 10 MILLIGRAM(S): 20 TABLET, FILM COATED ORAL at 20:49

## 2017-12-01 RX ADMIN — Medication 1 MILLIGRAM(S): at 20:49

## 2017-12-02 RX ADMIN — Medication 1 MILLIGRAM(S): at 20:30

## 2017-12-02 RX ADMIN — Medication 0.5 MILLIGRAM(S): at 08:42

## 2017-12-02 RX ADMIN — HALOPERIDOL DECANOATE 10 MILLIGRAM(S): 100 INJECTION INTRAMUSCULAR at 08:42

## 2017-12-02 RX ADMIN — HALOPERIDOL DECANOATE 15 MILLIGRAM(S): 100 INJECTION INTRAMUSCULAR at 20:30

## 2017-12-02 RX ADMIN — DIVALPROEX SODIUM 1250 MILLIGRAM(S): 500 TABLET, DELAYED RELEASE ORAL at 20:30

## 2017-12-02 RX ADMIN — SIMVASTATIN 10 MILLIGRAM(S): 20 TABLET, FILM COATED ORAL at 20:30

## 2017-12-02 RX ADMIN — SENNA PLUS 2 TABLET(S): 8.6 TABLET ORAL at 20:30

## 2017-12-02 RX ADMIN — AMLODIPINE BESYLATE 5 MILLIGRAM(S): 2.5 TABLET ORAL at 20:30

## 2017-12-03 RX ADMIN — AMLODIPINE BESYLATE 5 MILLIGRAM(S): 2.5 TABLET ORAL at 20:49

## 2017-12-03 RX ADMIN — SENNA PLUS 2 TABLET(S): 8.6 TABLET ORAL at 20:49

## 2017-12-03 RX ADMIN — HALOPERIDOL DECANOATE 15 MILLIGRAM(S): 100 INJECTION INTRAMUSCULAR at 20:49

## 2017-12-03 RX ADMIN — Medication 0.5 MILLIGRAM(S): at 09:09

## 2017-12-03 RX ADMIN — SIMVASTATIN 10 MILLIGRAM(S): 20 TABLET, FILM COATED ORAL at 20:49

## 2017-12-03 RX ADMIN — Medication 1 MILLIGRAM(S): at 20:49

## 2017-12-03 RX ADMIN — Medication 650 MILLIGRAM(S): at 09:08

## 2017-12-03 RX ADMIN — HALOPERIDOL DECANOATE 10 MILLIGRAM(S): 100 INJECTION INTRAMUSCULAR at 09:08

## 2017-12-03 RX ADMIN — DIVALPROEX SODIUM 1250 MILLIGRAM(S): 500 TABLET, DELAYED RELEASE ORAL at 20:49

## 2017-12-04 PROCEDURE — 99232 SBSQ HOSP IP/OBS MODERATE 35: CPT

## 2017-12-04 RX ORDER — HALOPERIDOL DECANOATE 100 MG/ML
12 INJECTION INTRAMUSCULAR AT BEDTIME
Qty: 0 | Refills: 0 | Status: DISCONTINUED | OUTPATIENT
Start: 2017-12-04 | End: 2017-12-05

## 2017-12-04 RX ADMIN — AMLODIPINE BESYLATE 5 MILLIGRAM(S): 2.5 TABLET ORAL at 20:29

## 2017-12-04 RX ADMIN — SIMVASTATIN 10 MILLIGRAM(S): 20 TABLET, FILM COATED ORAL at 20:30

## 2017-12-04 RX ADMIN — HALOPERIDOL DECANOATE 10 MILLIGRAM(S): 100 INJECTION INTRAMUSCULAR at 09:00

## 2017-12-04 RX ADMIN — SENNA PLUS 2 TABLET(S): 8.6 TABLET ORAL at 20:30

## 2017-12-04 RX ADMIN — HALOPERIDOL DECANOATE 12 MILLIGRAM(S): 100 INJECTION INTRAMUSCULAR at 20:30

## 2017-12-04 RX ADMIN — Medication 0.5 MILLIGRAM(S): at 09:00

## 2017-12-04 RX ADMIN — Medication 1 MILLIGRAM(S): at 20:29

## 2017-12-04 RX ADMIN — DIVALPROEX SODIUM 1250 MILLIGRAM(S): 500 TABLET, DELAYED RELEASE ORAL at 20:29

## 2017-12-05 PROCEDURE — 99232 SBSQ HOSP IP/OBS MODERATE 35: CPT

## 2017-12-05 RX ORDER — HALOPERIDOL DECANOATE 100 MG/ML
10 INJECTION INTRAMUSCULAR AT BEDTIME
Qty: 0 | Refills: 0 | Status: DISCONTINUED | OUTPATIENT
Start: 2017-12-05 | End: 2017-12-20

## 2017-12-05 RX ADMIN — DIVALPROEX SODIUM 1250 MILLIGRAM(S): 500 TABLET, DELAYED RELEASE ORAL at 21:14

## 2017-12-05 RX ADMIN — HALOPERIDOL DECANOATE 10 MILLIGRAM(S): 100 INJECTION INTRAMUSCULAR at 21:14

## 2017-12-05 RX ADMIN — HALOPERIDOL DECANOATE 10 MILLIGRAM(S): 100 INJECTION INTRAMUSCULAR at 08:27

## 2017-12-05 RX ADMIN — Medication 0.5 MILLIGRAM(S): at 08:27

## 2017-12-05 RX ADMIN — AMLODIPINE BESYLATE 5 MILLIGRAM(S): 2.5 TABLET ORAL at 21:14

## 2017-12-05 RX ADMIN — SIMVASTATIN 10 MILLIGRAM(S): 20 TABLET, FILM COATED ORAL at 21:14

## 2017-12-05 RX ADMIN — Medication 1 MILLIGRAM(S): at 21:14

## 2017-12-05 RX ADMIN — SENNA PLUS 2 TABLET(S): 8.6 TABLET ORAL at 21:14

## 2017-12-06 PROCEDURE — 99232 SBSQ HOSP IP/OBS MODERATE 35: CPT

## 2017-12-06 RX ORDER — CLONAZEPAM 1 MG
0.5 TABLET ORAL DAILY
Qty: 0 | Refills: 0 | Status: DISCONTINUED | OUTPATIENT
Start: 2017-12-06 | End: 2017-12-13

## 2017-12-06 RX ORDER — CLONAZEPAM 1 MG
1 TABLET ORAL AT BEDTIME
Qty: 0 | Refills: 0 | Status: DISCONTINUED | OUTPATIENT
Start: 2017-12-06 | End: 2017-12-13

## 2017-12-06 RX ADMIN — AMLODIPINE BESYLATE 5 MILLIGRAM(S): 2.5 TABLET ORAL at 21:22

## 2017-12-06 RX ADMIN — Medication 0.5 MILLIGRAM(S): at 08:41

## 2017-12-06 RX ADMIN — HALOPERIDOL DECANOATE 10 MILLIGRAM(S): 100 INJECTION INTRAMUSCULAR at 08:41

## 2017-12-06 RX ADMIN — DIVALPROEX SODIUM 1250 MILLIGRAM(S): 500 TABLET, DELAYED RELEASE ORAL at 21:22

## 2017-12-06 RX ADMIN — HALOPERIDOL DECANOATE 10 MILLIGRAM(S): 100 INJECTION INTRAMUSCULAR at 21:22

## 2017-12-06 RX ADMIN — SIMVASTATIN 10 MILLIGRAM(S): 20 TABLET, FILM COATED ORAL at 21:22

## 2017-12-06 RX ADMIN — Medication 1 MILLIGRAM(S): at 21:22

## 2017-12-06 RX ADMIN — SENNA PLUS 2 TABLET(S): 8.6 TABLET ORAL at 21:22

## 2017-12-07 PROCEDURE — 99232 SBSQ HOSP IP/OBS MODERATE 35: CPT

## 2017-12-07 RX ORDER — HALOPERIDOL DECANOATE 100 MG/ML
7 INJECTION INTRAMUSCULAR DAILY
Qty: 0 | Refills: 0 | Status: DISCONTINUED | OUTPATIENT
Start: 2017-12-08 | End: 2017-12-11

## 2017-12-07 RX ADMIN — Medication 650 MILLIGRAM(S): at 17:02

## 2017-12-07 RX ADMIN — DIVALPROEX SODIUM 1250 MILLIGRAM(S): 500 TABLET, DELAYED RELEASE ORAL at 21:39

## 2017-12-07 RX ADMIN — Medication 1 MILLIGRAM(S): at 21:39

## 2017-12-07 RX ADMIN — AMLODIPINE BESYLATE 5 MILLIGRAM(S): 2.5 TABLET ORAL at 21:39

## 2017-12-07 RX ADMIN — HALOPERIDOL DECANOATE 10 MILLIGRAM(S): 100 INJECTION INTRAMUSCULAR at 21:39

## 2017-12-07 RX ADMIN — SENNA PLUS 2 TABLET(S): 8.6 TABLET ORAL at 21:39

## 2017-12-07 RX ADMIN — SIMVASTATIN 10 MILLIGRAM(S): 20 TABLET, FILM COATED ORAL at 21:39

## 2017-12-07 RX ADMIN — Medication 0.5 MILLIGRAM(S): at 08:50

## 2017-12-07 RX ADMIN — HALOPERIDOL DECANOATE 10 MILLIGRAM(S): 100 INJECTION INTRAMUSCULAR at 08:50

## 2017-12-08 PROCEDURE — 99232 SBSQ HOSP IP/OBS MODERATE 35: CPT

## 2017-12-08 RX ADMIN — Medication 0.5 MILLIGRAM(S): at 08:31

## 2017-12-08 RX ADMIN — HALOPERIDOL DECANOATE 7 MILLIGRAM(S): 100 INJECTION INTRAMUSCULAR at 08:31

## 2017-12-08 RX ADMIN — Medication 1 MILLIGRAM(S): at 20:47

## 2017-12-08 RX ADMIN — SIMVASTATIN 10 MILLIGRAM(S): 20 TABLET, FILM COATED ORAL at 20:48

## 2017-12-08 RX ADMIN — SENNA PLUS 2 TABLET(S): 8.6 TABLET ORAL at 20:47

## 2017-12-08 RX ADMIN — DIVALPROEX SODIUM 1250 MILLIGRAM(S): 500 TABLET, DELAYED RELEASE ORAL at 20:47

## 2017-12-08 RX ADMIN — AMLODIPINE BESYLATE 5 MILLIGRAM(S): 2.5 TABLET ORAL at 20:47

## 2017-12-08 RX ADMIN — HALOPERIDOL DECANOATE 10 MILLIGRAM(S): 100 INJECTION INTRAMUSCULAR at 20:47

## 2017-12-09 RX ADMIN — Medication 650 MILLIGRAM(S): at 06:32

## 2017-12-09 RX ADMIN — Medication 1 MILLIGRAM(S): at 20:24

## 2017-12-09 RX ADMIN — SENNA PLUS 2 TABLET(S): 8.6 TABLET ORAL at 20:24

## 2017-12-09 RX ADMIN — SIMVASTATIN 10 MILLIGRAM(S): 20 TABLET, FILM COATED ORAL at 20:24

## 2017-12-09 RX ADMIN — HALOPERIDOL DECANOATE 10 MILLIGRAM(S): 100 INJECTION INTRAMUSCULAR at 20:24

## 2017-12-09 RX ADMIN — HALOPERIDOL DECANOATE 7 MILLIGRAM(S): 100 INJECTION INTRAMUSCULAR at 08:56

## 2017-12-09 RX ADMIN — DIVALPROEX SODIUM 1250 MILLIGRAM(S): 500 TABLET, DELAYED RELEASE ORAL at 20:24

## 2017-12-09 RX ADMIN — Medication 650 MILLIGRAM(S): at 20:23

## 2017-12-09 RX ADMIN — Medication 0.5 MILLIGRAM(S): at 08:56

## 2017-12-09 RX ADMIN — AMLODIPINE BESYLATE 5 MILLIGRAM(S): 2.5 TABLET ORAL at 20:24

## 2017-12-10 RX ADMIN — OXYCODONE HYDROCHLORIDE 5 MILLIGRAM(S): 5 TABLET ORAL at 10:49

## 2017-12-10 RX ADMIN — HALOPERIDOL DECANOATE 7 MILLIGRAM(S): 100 INJECTION INTRAMUSCULAR at 08:55

## 2017-12-10 RX ADMIN — SENNA PLUS 2 TABLET(S): 8.6 TABLET ORAL at 20:31

## 2017-12-10 RX ADMIN — Medication 0.5 MILLIGRAM(S): at 08:55

## 2017-12-10 RX ADMIN — AMLODIPINE BESYLATE 5 MILLIGRAM(S): 2.5 TABLET ORAL at 21:00

## 2017-12-10 RX ADMIN — HALOPERIDOL DECANOATE 10 MILLIGRAM(S): 100 INJECTION INTRAMUSCULAR at 20:31

## 2017-12-10 RX ADMIN — DIVALPROEX SODIUM 1250 MILLIGRAM(S): 500 TABLET, DELAYED RELEASE ORAL at 20:31

## 2017-12-10 RX ADMIN — SIMVASTATIN 10 MILLIGRAM(S): 20 TABLET, FILM COATED ORAL at 20:31

## 2017-12-10 RX ADMIN — Medication 1 MILLIGRAM(S): at 20:31

## 2017-12-10 RX ADMIN — Medication 650 MILLIGRAM(S): at 18:39

## 2017-12-10 NOTE — CHART NOTE - NSCHARTNOTEFT_GEN_A_CORE
72 F with a PMH of HTN and HLD was evaluated following c/o L hand pain. The patient had a "Comminuted fracture distal ulnar metaphysis and distal ulnar metaphyseal fracture" on 10/06/17 which was treated by ortho with a cast. The cast was removed 2 weeks ago as per patient and nursing staff. At that time, an x-ray was done and showed "redemonstrated impacted and dorsoradially displaced distal radial   metaphyseal fracture and slightly displaced and radially angulated distal ulnar fracture without significant change in overall fracture   configuration or alignment. Residual surrounding soft tissue swelling." Ortho deemed the patient ineligible for surgery due to non compliance. She was given a removable splint at the time and it was suggested that she follows up with Ortho in 6 weeks. The patient currently states that she has had 8/10, non-radiating pain to the dorsum of the L hand since the cast was removed. She states that the pain is constant and is not alleviated or aggravated by any specific movements, pressure, or positioning.        Physical:  General: NAD, splint noted on windowsill   MSK: Mild swelling on medial aspect of L wrist with a protuberance noted. Nontender wrists, metacarpals, or carpals. Mild limited ROM in wrist extension. No increased pain with ROM movements   Extremities: Ulnar and radial pulses of L hand 2+, normal capillary refill; no loss of sensation to L hand, wrist or fingers.   Skin: Mild edema of medial aspect of L hand. No erythema or contusion noted on L hand or wrist.    A/P: 72 F s/p ulnar and radial fracture/ displacement treatment who has been non-compliant with splint use. As per last x-ray on 11/22, the edema on the medial aspect of the L wrist may not be of new onset as x-ray states "Residual surrounding soft tissue swelling". However, due to the new complaint of pain, the patient may need an ortho consult sooner than the recommended 6 weeks. Primary team is already aware and will f/u. The patient will continue with Tylenol treatment for pain management which she states has been helpful.

## 2017-12-11 PROCEDURE — 99232 SBSQ HOSP IP/OBS MODERATE 35: CPT

## 2017-12-11 RX ORDER — HALOPERIDOL DECANOATE 100 MG/ML
5 INJECTION INTRAMUSCULAR DAILY
Qty: 0 | Refills: 0 | Status: DISCONTINUED | OUTPATIENT
Start: 2017-12-12 | End: 2017-12-15

## 2017-12-11 RX ADMIN — HALOPERIDOL DECANOATE 10 MILLIGRAM(S): 100 INJECTION INTRAMUSCULAR at 20:47

## 2017-12-11 RX ADMIN — Medication 1 MILLIGRAM(S): at 20:47

## 2017-12-11 RX ADMIN — DIVALPROEX SODIUM 1250 MILLIGRAM(S): 500 TABLET, DELAYED RELEASE ORAL at 20:47

## 2017-12-11 RX ADMIN — HALOPERIDOL DECANOATE 7 MILLIGRAM(S): 100 INJECTION INTRAMUSCULAR at 09:30

## 2017-12-11 RX ADMIN — SENNA PLUS 2 TABLET(S): 8.6 TABLET ORAL at 21:23

## 2017-12-11 RX ADMIN — SIMVASTATIN 10 MILLIGRAM(S): 20 TABLET, FILM COATED ORAL at 21:23

## 2017-12-11 RX ADMIN — Medication 0.5 MILLIGRAM(S): at 09:30

## 2017-12-12 PROCEDURE — 99232 SBSQ HOSP IP/OBS MODERATE 35: CPT

## 2017-12-12 RX ADMIN — SIMVASTATIN 10 MILLIGRAM(S): 20 TABLET, FILM COATED ORAL at 21:01

## 2017-12-12 RX ADMIN — AMLODIPINE BESYLATE 5 MILLIGRAM(S): 2.5 TABLET ORAL at 21:01

## 2017-12-12 RX ADMIN — HALOPERIDOL DECANOATE 10 MILLIGRAM(S): 100 INJECTION INTRAMUSCULAR at 21:01

## 2017-12-12 RX ADMIN — SENNA PLUS 2 TABLET(S): 8.6 TABLET ORAL at 21:01

## 2017-12-12 RX ADMIN — Medication 0.5 MILLIGRAM(S): at 08:54

## 2017-12-12 RX ADMIN — HALOPERIDOL DECANOATE 5 MILLIGRAM(S): 100 INJECTION INTRAMUSCULAR at 08:54

## 2017-12-12 RX ADMIN — DIVALPROEX SODIUM 1250 MILLIGRAM(S): 500 TABLET, DELAYED RELEASE ORAL at 21:01

## 2017-12-12 RX ADMIN — Medication 650 MILLIGRAM(S): at 08:54

## 2017-12-12 RX ADMIN — Medication 1 MILLIGRAM(S): at 21:01

## 2017-12-13 ENCOUNTER — APPOINTMENT (OUTPATIENT)
Dept: RADIOLOGY | Facility: HOSPITAL | Age: 72
End: 2017-12-13

## 2017-12-13 ENCOUNTER — APPOINTMENT (OUTPATIENT)
Dept: ORTHOPEDIC SURGERY | Facility: HOSPITAL | Age: 72
End: 2017-12-13

## 2017-12-13 ENCOUNTER — OUTPATIENT (OUTPATIENT)
Dept: OUTPATIENT SERVICES | Facility: HOSPITAL | Age: 72
LOS: 1 days | End: 2017-12-13
Payer: MEDICARE

## 2017-12-13 VITALS
DIASTOLIC BLOOD PRESSURE: 75 MMHG | SYSTOLIC BLOOD PRESSURE: 140 MMHG | HEART RATE: 65 BPM | WEIGHT: 148 LBS | BODY MASS INDEX: 24.63 KG/M2

## 2017-12-13 VITALS
DIASTOLIC BLOOD PRESSURE: 103 MMHG | HEART RATE: 106 BPM | SYSTOLIC BLOOD PRESSURE: 165 MMHG | WEIGHT: 89.62 LBS | BODY MASS INDEX: 14.91 KG/M2

## 2017-12-13 DIAGNOSIS — Z90.710 ACQUIRED ABSENCE OF BOTH CERVIX AND UTERUS: Chronic | ICD-10-CM

## 2017-12-13 PROCEDURE — 73110 X-RAY EXAM OF WRIST: CPT | Mod: 26,LT

## 2017-12-13 PROCEDURE — 99232 SBSQ HOSP IP/OBS MODERATE 35: CPT

## 2017-12-13 RX ORDER — CLONAZEPAM 1 MG
0.5 TABLET ORAL DAILY
Qty: 0 | Refills: 0 | Status: DISCONTINUED | OUTPATIENT
Start: 2017-12-14 | End: 2017-12-20

## 2017-12-13 RX ORDER — CLONAZEPAM 1 MG
1 TABLET ORAL AT BEDTIME
Qty: 0 | Refills: 0 | Status: DISCONTINUED | OUTPATIENT
Start: 2017-12-14 | End: 2017-12-20

## 2017-12-13 RX ORDER — CLONAZEPAM 1 MG
1 TABLET ORAL AT BEDTIME
Qty: 0 | Refills: 0 | Status: DISCONTINUED | OUTPATIENT
Start: 2017-12-13 | End: 2017-12-13

## 2017-12-13 RX ADMIN — SENNA PLUS 2 TABLET(S): 8.6 TABLET ORAL at 21:38

## 2017-12-13 RX ADMIN — HALOPERIDOL DECANOATE 10 MILLIGRAM(S): 100 INJECTION INTRAMUSCULAR at 21:38

## 2017-12-13 RX ADMIN — Medication 650 MILLIGRAM(S): at 13:37

## 2017-12-13 RX ADMIN — DIVALPROEX SODIUM 1250 MILLIGRAM(S): 500 TABLET, DELAYED RELEASE ORAL at 20:35

## 2017-12-13 RX ADMIN — SIMVASTATIN 10 MILLIGRAM(S): 20 TABLET, FILM COATED ORAL at 21:38

## 2017-12-13 RX ADMIN — AMLODIPINE BESYLATE 5 MILLIGRAM(S): 2.5 TABLET ORAL at 21:38

## 2017-12-13 RX ADMIN — HALOPERIDOL DECANOATE 5 MILLIGRAM(S): 100 INJECTION INTRAMUSCULAR at 08:38

## 2017-12-13 RX ADMIN — Medication 1 MILLIGRAM(S): at 20:35

## 2017-12-13 RX ADMIN — Medication 0.5 MILLIGRAM(S): at 08:38

## 2017-12-14 LAB
ALBUMIN SERPL ELPH-MCNC: 3.7 G/DL — SIGNIFICANT CHANGE UP (ref 3.3–5)
ALP SERPL-CCNC: 90 U/L — SIGNIFICANT CHANGE UP (ref 40–120)
ALT FLD-CCNC: 12 U/L — SIGNIFICANT CHANGE UP (ref 4–33)
AST SERPL-CCNC: 19 U/L — SIGNIFICANT CHANGE UP (ref 4–32)
BASOPHILS # BLD AUTO: 0.02 K/UL — SIGNIFICANT CHANGE UP (ref 0–0.2)
BASOPHILS NFR BLD AUTO: 0.3 % — SIGNIFICANT CHANGE UP (ref 0–2)
BASOPHILS NFR SPEC: 0 % — SIGNIFICANT CHANGE UP (ref 0–2)
BILIRUB SERPL-MCNC: 0.4 MG/DL — SIGNIFICANT CHANGE UP (ref 0.2–1.2)
BUN SERPL-MCNC: 20 MG/DL — SIGNIFICANT CHANGE UP (ref 7–23)
CALCIUM SERPL-MCNC: 8.8 MG/DL — SIGNIFICANT CHANGE UP (ref 8.4–10.5)
CHLORIDE SERPL-SCNC: 105 MMOL/L — SIGNIFICANT CHANGE UP (ref 98–107)
CO2 SERPL-SCNC: 26 MMOL/L — SIGNIFICANT CHANGE UP (ref 22–31)
CREAT SERPL-MCNC: 0.73 MG/DL — SIGNIFICANT CHANGE UP (ref 0.5–1.3)
EOSINOPHIL # BLD AUTO: 0.14 K/UL — SIGNIFICANT CHANGE UP (ref 0–0.5)
EOSINOPHIL NFR BLD AUTO: 1.9 % — SIGNIFICANT CHANGE UP (ref 0–6)
EOSINOPHIL NFR FLD: 1 % — SIGNIFICANT CHANGE UP (ref 0–6)
GLUCOSE SERPL-MCNC: 72 MG/DL — SIGNIFICANT CHANGE UP (ref 70–99)
HCT VFR BLD CALC: 42.9 % — SIGNIFICANT CHANGE UP (ref 34.5–45)
HGB BLD-MCNC: 13.9 G/DL — SIGNIFICANT CHANGE UP (ref 11.5–15.5)
IMM GRANULOCYTES # BLD AUTO: 0.03 # — SIGNIFICANT CHANGE UP
IMM GRANULOCYTES NFR BLD AUTO: 0.4 % — SIGNIFICANT CHANGE UP (ref 0–1.5)
LYMPHOCYTES # BLD AUTO: 3.12 K/UL — SIGNIFICANT CHANGE UP (ref 1–3.3)
LYMPHOCYTES # BLD AUTO: 43 % — SIGNIFICANT CHANGE UP (ref 13–44)
LYMPHOCYTES NFR SPEC AUTO: 42 % — SIGNIFICANT CHANGE UP (ref 13–44)
MANUAL SMEAR VERIFICATION: SIGNIFICANT CHANGE UP
MCHC RBC-ENTMCNC: 32 PG — SIGNIFICANT CHANGE UP (ref 27–34)
MCHC RBC-ENTMCNC: 32.4 % — SIGNIFICANT CHANGE UP (ref 32–36)
MCV RBC AUTO: 98.8 FL — SIGNIFICANT CHANGE UP (ref 80–100)
MONOCYTES # BLD AUTO: 0.6 K/UL — SIGNIFICANT CHANGE UP (ref 0–0.9)
MONOCYTES NFR BLD AUTO: 8.3 % — SIGNIFICANT CHANGE UP (ref 2–14)
MONOCYTES NFR BLD: 7 % — SIGNIFICANT CHANGE UP (ref 2–9)
MORPHOLOGY BLD-IMP: SIGNIFICANT CHANGE UP
NEUTROPHIL AB SER-ACNC: 41 % — LOW (ref 43–77)
NEUTROPHILS # BLD AUTO: 3.34 K/UL — SIGNIFICANT CHANGE UP (ref 1.8–7.4)
NEUTROPHILS NFR BLD AUTO: 46.1 % — SIGNIFICANT CHANGE UP (ref 43–77)
NRBC # FLD: 0 — SIGNIFICANT CHANGE UP
PLATELET # BLD AUTO: 85 K/UL — LOW (ref 150–400)
PLATELET CLUMP BLD QL SMEAR: SLIGHT — SIGNIFICANT CHANGE UP
PLATELET COUNT - ESTIMATE: SIGNIFICANT CHANGE UP
PMV BLD: 12.9 FL — SIGNIFICANT CHANGE UP (ref 7–13)
POTASSIUM SERPL-MCNC: 4 MMOL/L — SIGNIFICANT CHANGE UP (ref 3.5–5.3)
POTASSIUM SERPL-SCNC: 4 MMOL/L — SIGNIFICANT CHANGE UP (ref 3.5–5.3)
PROT SERPL-MCNC: 6.4 G/DL — SIGNIFICANT CHANGE UP (ref 6–8.3)
RBC # BLD: 4.34 M/UL — SIGNIFICANT CHANGE UP (ref 3.8–5.2)
RBC # FLD: 13.7 % — SIGNIFICANT CHANGE UP (ref 10.3–14.5)
REVIEW TO FOLLOW: YES — SIGNIFICANT CHANGE UP
SODIUM SERPL-SCNC: 144 MMOL/L — SIGNIFICANT CHANGE UP (ref 135–145)
VARIANT LYMPHS # BLD: 9 % — SIGNIFICANT CHANGE UP
WBC # BLD: 7.25 K/UL — SIGNIFICANT CHANGE UP (ref 3.8–10.5)
WBC # FLD AUTO: 7.25 K/UL — SIGNIFICANT CHANGE UP (ref 3.8–10.5)

## 2017-12-14 PROCEDURE — 99232 SBSQ HOSP IP/OBS MODERATE 35: CPT

## 2017-12-14 RX ADMIN — DIVALPROEX SODIUM 1250 MILLIGRAM(S): 500 TABLET, DELAYED RELEASE ORAL at 20:47

## 2017-12-14 RX ADMIN — Medication 1 MILLIGRAM(S): at 20:47

## 2017-12-14 RX ADMIN — SIMVASTATIN 10 MILLIGRAM(S): 20 TABLET, FILM COATED ORAL at 20:47

## 2017-12-14 RX ADMIN — HALOPERIDOL DECANOATE 5 MILLIGRAM(S): 100 INJECTION INTRAMUSCULAR at 10:19

## 2017-12-14 RX ADMIN — HALOPERIDOL DECANOATE 10 MILLIGRAM(S): 100 INJECTION INTRAMUSCULAR at 20:47

## 2017-12-14 RX ADMIN — AMLODIPINE BESYLATE 5 MILLIGRAM(S): 2.5 TABLET ORAL at 20:47

## 2017-12-14 RX ADMIN — SENNA PLUS 2 TABLET(S): 8.6 TABLET ORAL at 20:47

## 2017-12-14 RX ADMIN — Medication 0.5 MILLIGRAM(S): at 10:19

## 2017-12-15 DIAGNOSIS — S62.102P FRACTURE OF UNSPECIFIED CARPAL BONE, LEFT WRIST, SUBSEQUENT ENCOUNTER FOR FRACTURE WITH MALUNION: ICD-10-CM

## 2017-12-15 LAB
HCT VFR BLD CALC: 38.8 % — SIGNIFICANT CHANGE UP (ref 34.5–45)
HGB BLD-MCNC: 12.7 G/DL — SIGNIFICANT CHANGE UP (ref 11.5–15.5)
MCHC RBC-ENTMCNC: 32.2 PG — SIGNIFICANT CHANGE UP (ref 27–34)
MCHC RBC-ENTMCNC: 32.7 % — SIGNIFICANT CHANGE UP (ref 32–36)
MCV RBC AUTO: 98.2 FL — SIGNIFICANT CHANGE UP (ref 80–100)
NRBC # FLD: 0 — SIGNIFICANT CHANGE UP
PLATELET # BLD AUTO: 116 K/UL — LOW (ref 150–400)
PMV BLD: 13 FL — SIGNIFICANT CHANGE UP (ref 7–13)
RBC # BLD: 3.95 M/UL — SIGNIFICANT CHANGE UP (ref 3.8–5.2)
RBC # FLD: 13.7 % — SIGNIFICANT CHANGE UP (ref 10.3–14.5)
WBC # BLD: 7.84 K/UL — SIGNIFICANT CHANGE UP (ref 3.8–10.5)
WBC # FLD AUTO: 7.84 K/UL — SIGNIFICANT CHANGE UP (ref 3.8–10.5)

## 2017-12-15 PROCEDURE — 99232 SBSQ HOSP IP/OBS MODERATE 35: CPT

## 2017-12-15 RX ORDER — HALOPERIDOL DECANOATE 100 MG/ML
2 INJECTION INTRAMUSCULAR DAILY
Qty: 0 | Refills: 0 | Status: DISCONTINUED | OUTPATIENT
Start: 2017-12-16 | End: 2017-12-18

## 2017-12-15 RX ADMIN — HALOPERIDOL DECANOATE 5 MILLIGRAM(S): 100 INJECTION INTRAMUSCULAR at 09:37

## 2017-12-15 RX ADMIN — AMLODIPINE BESYLATE 5 MILLIGRAM(S): 2.5 TABLET ORAL at 21:05

## 2017-12-15 RX ADMIN — Medication 0.5 MILLIGRAM(S): at 09:37

## 2017-12-15 RX ADMIN — SENNA PLUS 2 TABLET(S): 8.6 TABLET ORAL at 21:05

## 2017-12-15 RX ADMIN — DIVALPROEX SODIUM 1250 MILLIGRAM(S): 500 TABLET, DELAYED RELEASE ORAL at 21:05

## 2017-12-15 RX ADMIN — HALOPERIDOL DECANOATE 10 MILLIGRAM(S): 100 INJECTION INTRAMUSCULAR at 21:05

## 2017-12-15 RX ADMIN — Medication 1 MILLIGRAM(S): at 21:05

## 2017-12-15 RX ADMIN — SIMVASTATIN 10 MILLIGRAM(S): 20 TABLET, FILM COATED ORAL at 21:05

## 2017-12-16 RX ADMIN — SENNA PLUS 2 TABLET(S): 8.6 TABLET ORAL at 21:29

## 2017-12-16 RX ADMIN — HALOPERIDOL DECANOATE 10 MILLIGRAM(S): 100 INJECTION INTRAMUSCULAR at 21:29

## 2017-12-16 RX ADMIN — DIVALPROEX SODIUM 1250 MILLIGRAM(S): 500 TABLET, DELAYED RELEASE ORAL at 21:29

## 2017-12-16 RX ADMIN — AMLODIPINE BESYLATE 5 MILLIGRAM(S): 2.5 TABLET ORAL at 21:29

## 2017-12-16 RX ADMIN — SIMVASTATIN 10 MILLIGRAM(S): 20 TABLET, FILM COATED ORAL at 21:29

## 2017-12-16 RX ADMIN — Medication 1 MILLIGRAM(S): at 21:29

## 2017-12-16 RX ADMIN — Medication 0.5 MILLIGRAM(S): at 09:26

## 2017-12-16 RX ADMIN — HALOPERIDOL DECANOATE 2 MILLIGRAM(S): 100 INJECTION INTRAMUSCULAR at 09:26

## 2017-12-17 RX ADMIN — DIVALPROEX SODIUM 1250 MILLIGRAM(S): 500 TABLET, DELAYED RELEASE ORAL at 20:05

## 2017-12-17 RX ADMIN — HALOPERIDOL DECANOATE 2 MILLIGRAM(S): 100 INJECTION INTRAMUSCULAR at 08:12

## 2017-12-17 RX ADMIN — Medication 0.5 MILLIGRAM(S): at 08:12

## 2017-12-17 RX ADMIN — AMLODIPINE BESYLATE 5 MILLIGRAM(S): 2.5 TABLET ORAL at 20:05

## 2017-12-17 RX ADMIN — SENNA PLUS 2 TABLET(S): 8.6 TABLET ORAL at 20:05

## 2017-12-17 RX ADMIN — SIMVASTATIN 10 MILLIGRAM(S): 20 TABLET, FILM COATED ORAL at 20:05

## 2017-12-17 RX ADMIN — Medication 1 MILLIGRAM(S): at 20:05

## 2017-12-17 RX ADMIN — HALOPERIDOL DECANOATE 10 MILLIGRAM(S): 100 INJECTION INTRAMUSCULAR at 20:05

## 2017-12-18 PROCEDURE — 99232 SBSQ HOSP IP/OBS MODERATE 35: CPT

## 2017-12-18 RX ORDER — BENZTROPINE MESYLATE 1 MG
0.5 TABLET ORAL DAILY
Qty: 0 | Refills: 0 | Status: DISCONTINUED | OUTPATIENT
Start: 2017-12-19 | End: 2017-12-20

## 2017-12-18 RX ORDER — BENZTROPINE MESYLATE 1 MG
0.5 TABLET ORAL ONCE
Qty: 0 | Refills: 0 | Status: COMPLETED | OUTPATIENT
Start: 2017-12-18 | End: 2017-12-18

## 2017-12-18 RX ADMIN — AMLODIPINE BESYLATE 5 MILLIGRAM(S): 2.5 TABLET ORAL at 20:45

## 2017-12-18 RX ADMIN — Medication 1 MILLIGRAM(S): at 20:45

## 2017-12-18 RX ADMIN — DIVALPROEX SODIUM 1250 MILLIGRAM(S): 500 TABLET, DELAYED RELEASE ORAL at 20:45

## 2017-12-18 RX ADMIN — SENNA PLUS 2 TABLET(S): 8.6 TABLET ORAL at 20:45

## 2017-12-18 RX ADMIN — Medication 0.5 MILLIGRAM(S): at 13:45

## 2017-12-18 RX ADMIN — HALOPERIDOL DECANOATE 2 MILLIGRAM(S): 100 INJECTION INTRAMUSCULAR at 09:56

## 2017-12-18 RX ADMIN — SIMVASTATIN 10 MILLIGRAM(S): 20 TABLET, FILM COATED ORAL at 20:45

## 2017-12-18 RX ADMIN — Medication 0.5 MILLIGRAM(S): at 09:56

## 2017-12-18 RX ADMIN — HALOPERIDOL DECANOATE 10 MILLIGRAM(S): 100 INJECTION INTRAMUSCULAR at 20:45

## 2017-12-19 PROCEDURE — 99232 SBSQ HOSP IP/OBS MODERATE 35: CPT

## 2017-12-19 RX ORDER — DIVALPROEX SODIUM 500 MG/1
1 TABLET, DELAYED RELEASE ORAL
Qty: 15 | Refills: 0 | OUTPATIENT
Start: 2017-12-19 | End: 2018-01-02

## 2017-12-19 RX ORDER — CLONAZEPAM 1 MG
1 TABLET ORAL
Qty: 15 | Refills: 0 | OUTPATIENT
Start: 2017-12-19 | End: 2018-01-02

## 2017-12-19 RX ORDER — FUROSEMIDE 40 MG
0 TABLET ORAL
Qty: 0 | Refills: 0 | COMMUNITY

## 2017-12-19 RX ORDER — SENNA PLUS 8.6 MG/1
2 TABLET ORAL
Qty: 30 | Refills: 0 | OUTPATIENT
Start: 2017-12-19 | End: 2018-01-02

## 2017-12-19 RX ORDER — AMLODIPINE BESYLATE 2.5 MG/1
0 TABLET ORAL
Qty: 0 | Refills: 0 | COMMUNITY

## 2017-12-19 RX ORDER — FUROSEMIDE 40 MG
1 TABLET ORAL
Qty: 0 | Refills: 0 | COMMUNITY

## 2017-12-19 RX ORDER — BENZTROPINE MESYLATE 1 MG
1 TABLET ORAL
Qty: 15 | Refills: 0 | OUTPATIENT
Start: 2017-12-19 | End: 2018-01-02

## 2017-12-19 RX ORDER — MIRTAZAPINE 45 MG/1
0.5 TABLET, ORALLY DISINTEGRATING ORAL
Qty: 0 | Refills: 0 | COMMUNITY

## 2017-12-19 RX ORDER — FLUPHENAZINE HYDROCHLORIDE 1 MG/1
1 TABLET, FILM COATED ORAL
Qty: 0 | Refills: 0 | COMMUNITY

## 2017-12-19 RX ORDER — HALOPERIDOL DECANOATE 100 MG/ML
1 INJECTION INTRAMUSCULAR
Qty: 0 | Refills: 0 | COMMUNITY
Start: 2017-12-19

## 2017-12-19 RX ORDER — DIVALPROEX SODIUM 500 MG/1
2 TABLET, DELAYED RELEASE ORAL
Qty: 30 | Refills: 0 | OUTPATIENT
Start: 2017-12-19 | End: 2018-01-02

## 2017-12-19 RX ORDER — HALOPERIDOL DECANOATE 100 MG/ML
1 INJECTION INTRAMUSCULAR
Qty: 15 | Refills: 0 | OUTPATIENT
Start: 2017-12-19 | End: 2018-01-02

## 2017-12-19 RX ORDER — DIVALPROEX SODIUM 500 MG/1
5 TABLET, DELAYED RELEASE ORAL
Qty: 75 | Refills: 0 | OUTPATIENT
Start: 2017-12-19 | End: 2018-01-02

## 2017-12-19 RX ORDER — POTASSIUM CHLORIDE 20 MEQ
0 PACKET (EA) ORAL
Qty: 0 | Refills: 0 | COMMUNITY

## 2017-12-19 RX ORDER — AMLODIPINE BESYLATE 2.5 MG/1
1 TABLET ORAL
Qty: 0 | Refills: 0 | COMMUNITY

## 2017-12-19 RX ORDER — AMLODIPINE BESYLATE 2.5 MG/1
1 TABLET ORAL
Qty: 15 | Refills: 0 | OUTPATIENT
Start: 2017-12-19 | End: 2018-01-02

## 2017-12-19 RX ORDER — DIVALPROEX SODIUM 500 MG/1
1000 TABLET, DELAYED RELEASE ORAL AT BEDTIME
Qty: 0 | Refills: 0 | Status: DISCONTINUED | OUTPATIENT
Start: 2017-12-19 | End: 2017-12-20

## 2017-12-19 RX ORDER — DIVALPROEX SODIUM 500 MG/1
250 TABLET, DELAYED RELEASE ORAL AT BEDTIME
Qty: 0 | Refills: 0 | Status: DISCONTINUED | OUTPATIENT
Start: 2017-12-19 | End: 2017-12-20

## 2017-12-19 RX ORDER — SIMVASTATIN 20 MG/1
1 TABLET, FILM COATED ORAL
Qty: 15 | Refills: 0 | OUTPATIENT
Start: 2017-12-19 | End: 2018-01-02

## 2017-12-19 RX ADMIN — Medication 0.5 MILLIGRAM(S): at 09:25

## 2017-12-19 RX ADMIN — AMLODIPINE BESYLATE 5 MILLIGRAM(S): 2.5 TABLET ORAL at 21:07

## 2017-12-19 RX ADMIN — SENNA PLUS 2 TABLET(S): 8.6 TABLET ORAL at 21:07

## 2017-12-19 RX ADMIN — HALOPERIDOL DECANOATE 10 MILLIGRAM(S): 100 INJECTION INTRAMUSCULAR at 21:07

## 2017-12-19 RX ADMIN — SIMVASTATIN 10 MILLIGRAM(S): 20 TABLET, FILM COATED ORAL at 21:07

## 2017-12-19 RX ADMIN — DIVALPROEX SODIUM 250 MILLIGRAM(S): 500 TABLET, DELAYED RELEASE ORAL at 21:07

## 2017-12-19 RX ADMIN — Medication 1 MILLIGRAM(S): at 21:07

## 2017-12-19 RX ADMIN — DIVALPROEX SODIUM 1000 MILLIGRAM(S): 500 TABLET, DELAYED RELEASE ORAL at 21:07

## 2017-12-20 VITALS — TEMPERATURE: 99 F

## 2017-12-20 PROCEDURE — 99238 HOSP IP/OBS DSCHRG MGMT 30/<: CPT

## 2017-12-20 RX ADMIN — Medication 0.5 MILLIGRAM(S): at 10:07

## 2018-02-07 ENCOUNTER — APPOINTMENT (OUTPATIENT)
Dept: ORTHOPEDIC SURGERY | Facility: HOSPITAL | Age: 73
End: 2018-02-07

## 2018-07-23 PROBLEM — S62.101P: Status: ACTIVE | Noted: 2017-10-29

## 2019-03-20 NOTE — ED PROVIDER NOTE - PSYCHIATRIC LEVEL OF CONSCIOUSNESS
[facility] [place] Scheduling for Imaging - [phone] (all  sites & [facility] sites). Make a follow up appointment to discuss your results.    FOLLOW-UP APPOINTMENT  It is recommended you schedule a follow-up appointment with us after MRI     Office Hours:  [facility]:  9:00am to 4:00pm Friday.  [facility]:  [phone].     ANNA  We do highly recommend [employer] if you do not already have this. You can request access via the internet or by simply talking with a  at any of the clinics.   [email or web address].    We want to give the best care possible. If you receive a [employer] survey from our office, please take the time to fill out the survey and return it in the envelope provided. Your feedback helps us know how we are doing and we really appreciate it.     Thank you for choosing [facility] as your Orthopaedic provider!               
alert

## 2020-11-14 NOTE — ED ADULT NURSE NOTE - NS ED NURSE DISCH DISPOSITION
Will provide referral for Ortho and Neurosurgery at this time  Also referral for pain management  Contact information provided  One final dose of Tramadol provided for one month  Will follow up in one month to ensure follow up as been completed with specialist    Discharged

## 2022-01-13 NOTE — ED PROVIDER NOTE - PMH
Provided patient with a Financial Aid Application due to pt being unfunded.   Hypertension    Schizophrenia

## 2023-08-07 NOTE — DISCHARGE NOTE ADULT - PROVIDER TOKENS
FREE:[LAST:[[place]],PHONE:[(   )    -],FAX:[(   )    -]],FREE:[LAST:[[place]],PHONE:[phone]],FAX:[(   )    -]] Thalidomide Counseling: I discussed with the patient the risks of thalidomide including but not limited to birth defects, anxiety, weakness, chest pain, dizziness, cough and severe allergy.

## 2023-09-22 NOTE — ED PROVIDER NOTE - CPE EDP HEAD NORM PED
Goal Outcome Evaluation:   Patient is currently resting in bed. VSS with no complaints or S/S of distress. Will continue to follow plan of care.                      
Goal Outcome Evaluation:  Progress: no change   Pt D/C'd per Dr. Miller. IV and Telemetry D/C'd. Pt resting in bed, watching television. Pt has tolerated all interventions. No complaints/concerns. No acute distress noted. Will continue to provide care until pt off unit.   
Head atraumatic, normal cephalic shape.

## 2023-12-14 NOTE — ED PROVIDER NOTE - CONSTITUTIONAL DISTRESS
no apparent When pt is no longer an acute or imminent risk of harm to self or others, and is able to care for self safely, pt may then be discharged.

## 2024-02-20 NOTE — ED BEHAVIORAL HEALTH ASSESSMENT NOTE - RELATEDNESS
[Negative] : Musculoskeletal [FreeTextEntry3] : per HPI [FreeTextEntry8] : per HPI [FreeTextEntry9] : per HPI [de-identified] : per HPI Fair

## 2024-06-04 NOTE — ED PROVIDER NOTE - CROS ED ROS STATEMENT
What Type Of Note Output Would You Prefer (Optional)?: Standard Output How Severe Is Your Skin Lesion?: mild Is This A New Presentation, Or A Follow-Up?: Skin Lesion all other ROS negative except as per HPI

## 2024-08-27 NOTE — ED ADULT NURSE NOTE - PSYCHOSOCIAL WDL
Report given to Juan GRIFFIN.    Alert and oriented x 3, normal mood and affect, no apparent risk to self or others.
